# Patient Record
Sex: MALE | Race: WHITE | NOT HISPANIC OR LATINO | Employment: OTHER | URBAN - METROPOLITAN AREA
[De-identification: names, ages, dates, MRNs, and addresses within clinical notes are randomized per-mention and may not be internally consistent; named-entity substitution may affect disease eponyms.]

---

## 2023-01-01 ENCOUNTER — ANESTHESIA (OUTPATIENT)
Dept: OPERATING ROOM | Facility: HOSPITAL | Age: 83
End: 2023-01-01
Payer: MEDICARE

## 2023-01-01 ENCOUNTER — OFFICE VISIT (OUTPATIENT)
Dept: CARDIOLOGY | Facility: HOSPITAL | Age: 83
DRG: 377 | End: 2023-01-01
Payer: MEDICARE

## 2023-01-01 ENCOUNTER — OFFICE VISIT (OUTPATIENT)
Dept: CARDIOLOGY | Facility: HOSPITAL | Age: 83
End: 2023-01-01
Payer: MEDICARE

## 2023-01-01 ENCOUNTER — ANESTHESIA (OUTPATIENT)
Dept: OPERATING ROOM | Facility: HOSPITAL | Age: 83
DRG: 377 | End: 2023-01-01
Payer: MEDICARE

## 2023-01-01 ENCOUNTER — APPOINTMENT (OUTPATIENT)
Dept: CARDIOLOGY | Facility: HOSPITAL | Age: 83
End: 2023-01-01
Payer: MEDICARE

## 2023-01-01 ENCOUNTER — TELEPHONE (OUTPATIENT)
Dept: INFECTIOUS DISEASES | Facility: HOSPITAL | Age: 83
End: 2023-01-01

## 2023-01-01 ENCOUNTER — INFUSION (OUTPATIENT)
Dept: INFUSION THERAPY | Facility: HOSPITAL | Age: 83
End: 2023-01-01
Payer: MEDICARE

## 2023-01-01 ENCOUNTER — TELEPHONE (OUTPATIENT)
Dept: GASTROENTEROLOGY | Facility: CLINIC | Age: 83
End: 2023-01-01
Payer: MEDICARE

## 2023-01-01 ENCOUNTER — APPOINTMENT (OUTPATIENT)
Dept: GASTROENTEROLOGY | Facility: HOSPITAL | Age: 83
DRG: 377 | End: 2023-01-01
Payer: MEDICARE

## 2023-01-01 ENCOUNTER — INFUSION (OUTPATIENT)
Dept: INFUSION THERAPY | Facility: HOSPITAL | Age: 83
DRG: 377 | End: 2023-01-01
Payer: MEDICARE

## 2023-01-01 ENCOUNTER — APPOINTMENT (OUTPATIENT)
Dept: CARDIOLOGY | Facility: HOSPITAL | Age: 83
DRG: 283 | End: 2023-01-01
Payer: MEDICARE

## 2023-01-01 ENCOUNTER — ANESTHESIA EVENT (OUTPATIENT)
Dept: OPERATING ROOM | Facility: HOSPITAL | Age: 83
DRG: 377 | End: 2023-01-01
Payer: MEDICARE

## 2023-01-01 ENCOUNTER — LAB (OUTPATIENT)
Dept: LAB | Facility: LAB | Age: 83
End: 2023-01-01
Payer: MEDICARE

## 2023-01-01 ENCOUNTER — OFFICE VISIT (OUTPATIENT)
Dept: HEMATOLOGY/ONCOLOGY | Facility: CLINIC | Age: 83
End: 2023-01-01
Payer: MEDICARE

## 2023-01-01 ENCOUNTER — HOSPITAL ENCOUNTER (INPATIENT)
Facility: HOSPITAL | Age: 83
LOS: 4 days | Discharge: HOME | DRG: 377 | End: 2023-10-11
Attending: EMERGENCY MEDICINE | Admitting: INTERNAL MEDICINE
Payer: MEDICARE

## 2023-01-01 ENCOUNTER — HOSPITAL ENCOUNTER (INPATIENT)
Facility: HOSPITAL | Age: 83
LOS: 2 days | Discharge: HOME | DRG: 377 | End: 2023-10-28
Attending: STUDENT IN AN ORGANIZED HEALTH CARE EDUCATION/TRAINING PROGRAM | Admitting: INTERNAL MEDICINE
Payer: MEDICARE

## 2023-01-01 ENCOUNTER — APPOINTMENT (OUTPATIENT)
Dept: RADIOLOGY | Facility: HOSPITAL | Age: 83
End: 2023-01-01
Payer: MEDICARE

## 2023-01-01 ENCOUNTER — HOSPITAL ENCOUNTER (OUTPATIENT)
Dept: OPERATING ROOM | Facility: HOSPITAL | Age: 83
Discharge: HOME | End: 2023-12-07
Payer: MEDICARE

## 2023-01-01 ENCOUNTER — TELEPHONE (OUTPATIENT)
Dept: CARDIOLOGY | Facility: CLINIC | Age: 83
End: 2023-01-01

## 2023-01-01 ENCOUNTER — HOSPITAL ENCOUNTER (EMERGENCY)
Facility: HOSPITAL | Age: 83
Discharge: LEFT BEFORE DISCHARGE | End: 2023-10-06
Attending: STUDENT IN AN ORGANIZED HEALTH CARE EDUCATION/TRAINING PROGRAM | Admitting: EMERGENCY MEDICINE
Payer: MEDICARE

## 2023-01-01 ENCOUNTER — SPECIALTY PHARMACY (OUTPATIENT)
Dept: PHARMACY | Facility: CLINIC | Age: 83
End: 2023-01-01

## 2023-01-01 ENCOUNTER — ANESTHESIA EVENT (OUTPATIENT)
Dept: OPERATING ROOM | Facility: HOSPITAL | Age: 83
End: 2023-01-01
Payer: MEDICARE

## 2023-01-01 ENCOUNTER — APPOINTMENT (OUTPATIENT)
Dept: CARDIOLOGY | Facility: CLINIC | Age: 83
End: 2023-01-01
Payer: MEDICARE

## 2023-01-01 ENCOUNTER — APPOINTMENT (OUTPATIENT)
Dept: GASTROENTEROLOGY | Facility: CLINIC | Age: 83
End: 2023-01-01
Payer: MEDICARE

## 2023-01-01 ENCOUNTER — HOSPITAL ENCOUNTER (INPATIENT)
Facility: HOSPITAL | Age: 83
LOS: 4 days | DRG: 283 | End: 2023-12-23
Attending: EMERGENCY MEDICINE | Admitting: FAMILY MEDICINE
Payer: MEDICARE

## 2023-01-01 ENCOUNTER — APPOINTMENT (OUTPATIENT)
Dept: OPERATING ROOM | Facility: HOSPITAL | Age: 83
End: 2023-01-01
Payer: MEDICARE

## 2023-01-01 ENCOUNTER — APPOINTMENT (OUTPATIENT)
Dept: LAB | Facility: HOSPITAL | Age: 83
End: 2023-01-01
Payer: MEDICARE

## 2023-01-01 ENCOUNTER — APPOINTMENT (OUTPATIENT)
Dept: RADIOLOGY | Facility: HOSPITAL | Age: 83
DRG: 283 | End: 2023-01-01
Payer: MEDICARE

## 2023-01-01 ENCOUNTER — OFFICE VISIT (OUTPATIENT)
Dept: CARDIOLOGY | Facility: HOSPITAL | Age: 83
DRG: 283 | End: 2023-01-01
Payer: MEDICARE

## 2023-01-01 ENCOUNTER — APPOINTMENT (OUTPATIENT)
Dept: OPERATING ROOM | Facility: HOSPITAL | Age: 83
DRG: 377 | End: 2023-01-01
Payer: MEDICARE

## 2023-01-01 ENCOUNTER — TELEPHONE (OUTPATIENT)
Dept: CARDIOLOGY | Facility: CLINIC | Age: 83
End: 2023-01-01
Payer: MEDICARE

## 2023-01-01 ENCOUNTER — HOSPITAL ENCOUNTER (OUTPATIENT)
Dept: RADIOLOGY | Facility: HOSPITAL | Age: 83
Discharge: HOME | End: 2023-11-07
Payer: MEDICARE

## 2023-01-01 ENCOUNTER — TELEPHONE (OUTPATIENT)
Dept: GASTROENTEROLOGY | Facility: CLINIC | Age: 83
End: 2023-01-01

## 2023-01-01 ENCOUNTER — HOSPITAL ENCOUNTER (OUTPATIENT)
Dept: CARDIOLOGY | Facility: HOSPITAL | Age: 83
Discharge: HOME | End: 2023-11-08
Payer: MEDICARE

## 2023-01-01 ENCOUNTER — OFFICE VISIT (OUTPATIENT)
Dept: CARDIOLOGY | Facility: CLINIC | Age: 83
End: 2023-01-01
Payer: MEDICARE

## 2023-01-01 VITALS
RESPIRATION RATE: 20 BRPM | HEART RATE: 72 BPM | SYSTOLIC BLOOD PRESSURE: 124 MMHG | TEMPERATURE: 97.2 F | DIASTOLIC BLOOD PRESSURE: 64 MMHG | OXYGEN SATURATION: 96 %

## 2023-01-01 VITALS
DIASTOLIC BLOOD PRESSURE: 73 MMHG | HEART RATE: 71 BPM | RESPIRATION RATE: 20 BRPM | SYSTOLIC BLOOD PRESSURE: 116 MMHG | OXYGEN SATURATION: 96 % | TEMPERATURE: 98.6 F

## 2023-01-01 VITALS
DIASTOLIC BLOOD PRESSURE: 68 MMHG | HEART RATE: 72 BPM | WEIGHT: 191.4 LBS | OXYGEN SATURATION: 100 % | RESPIRATION RATE: 22 BRPM | SYSTOLIC BLOOD PRESSURE: 144 MMHG | BODY MASS INDEX: 29.1 KG/M2

## 2023-01-01 VITALS
RESPIRATION RATE: 20 BRPM | DIASTOLIC BLOOD PRESSURE: 61 MMHG | BODY MASS INDEX: 29.88 KG/M2 | OXYGEN SATURATION: 96 % | SYSTOLIC BLOOD PRESSURE: 109 MMHG | WEIGHT: 190.8 LBS | HEART RATE: 74 BPM

## 2023-01-01 VITALS
SYSTOLIC BLOOD PRESSURE: 109 MMHG | WEIGHT: 193.7 LBS | HEART RATE: 84 BPM | DIASTOLIC BLOOD PRESSURE: 55 MMHG | RESPIRATION RATE: 20 BRPM | OXYGEN SATURATION: 98 % | BODY MASS INDEX: 29.45 KG/M2

## 2023-01-01 VITALS
DIASTOLIC BLOOD PRESSURE: 62 MMHG | WEIGHT: 193.4 LBS | RESPIRATION RATE: 20 BRPM | SYSTOLIC BLOOD PRESSURE: 122 MMHG | OXYGEN SATURATION: 98 % | BODY MASS INDEX: 29.41 KG/M2 | HEART RATE: 77 BPM

## 2023-01-01 VITALS
HEART RATE: 71 BPM | WEIGHT: 186 LBS | HEIGHT: 68 IN | BODY MASS INDEX: 28.19 KG/M2 | SYSTOLIC BLOOD PRESSURE: 122 MMHG | DIASTOLIC BLOOD PRESSURE: 78 MMHG

## 2023-01-01 VITALS
WEIGHT: 192.2 LBS | SYSTOLIC BLOOD PRESSURE: 117 MMHG | OXYGEN SATURATION: 93 % | BODY MASS INDEX: 29.22 KG/M2 | RESPIRATION RATE: 20 BRPM | HEART RATE: 71 BPM | DIASTOLIC BLOOD PRESSURE: 72 MMHG

## 2023-01-01 VITALS
RESPIRATION RATE: 18 BRPM | DIASTOLIC BLOOD PRESSURE: 58 MMHG | OXYGEN SATURATION: 97 % | TEMPERATURE: 97.4 F | SYSTOLIC BLOOD PRESSURE: 108 MMHG | HEART RATE: 70 BPM

## 2023-01-01 VITALS
HEIGHT: 67 IN | TEMPERATURE: 101.4 F | DIASTOLIC BLOOD PRESSURE: 41 MMHG | WEIGHT: 180.78 LBS | RESPIRATION RATE: 20 BRPM | HEART RATE: 73 BPM | BODY MASS INDEX: 28.37 KG/M2 | SYSTOLIC BLOOD PRESSURE: 88 MMHG | OXYGEN SATURATION: 94 %

## 2023-01-01 VITALS
WEIGHT: 189.3 LBS | BODY MASS INDEX: 29.65 KG/M2 | HEART RATE: 74 BPM | DIASTOLIC BLOOD PRESSURE: 72 MMHG | SYSTOLIC BLOOD PRESSURE: 126 MMHG | OXYGEN SATURATION: 96 % | RESPIRATION RATE: 20 BRPM

## 2023-01-01 VITALS
RESPIRATION RATE: 18 BRPM | OXYGEN SATURATION: 97 % | DIASTOLIC BLOOD PRESSURE: 69 MMHG | HEART RATE: 70 BPM | WEIGHT: 191.7 LBS | SYSTOLIC BLOOD PRESSURE: 115 MMHG | HEIGHT: 68 IN | BODY MASS INDEX: 29.05 KG/M2

## 2023-01-01 VITALS
RESPIRATION RATE: 16 BRPM | DIASTOLIC BLOOD PRESSURE: 66 MMHG | OXYGEN SATURATION: 97 % | SYSTOLIC BLOOD PRESSURE: 119 MMHG | BODY MASS INDEX: 29.46 KG/M2 | WEIGHT: 193.78 LBS | TEMPERATURE: 97.1 F | HEART RATE: 73 BPM

## 2023-01-01 VITALS
OXYGEN SATURATION: 98 % | SYSTOLIC BLOOD PRESSURE: 112 MMHG | DIASTOLIC BLOOD PRESSURE: 63 MMHG | WEIGHT: 193.4 LBS | HEART RATE: 72 BPM | RESPIRATION RATE: 18 BRPM | BODY MASS INDEX: 29.41 KG/M2

## 2023-01-01 VITALS
HEART RATE: 83 BPM | RESPIRATION RATE: 16 BRPM | WEIGHT: 190.7 LBS | BODY MASS INDEX: 28.9 KG/M2 | DIASTOLIC BLOOD PRESSURE: 64 MMHG | TEMPERATURE: 97.3 F | HEIGHT: 68 IN | OXYGEN SATURATION: 100 % | SYSTOLIC BLOOD PRESSURE: 110 MMHG

## 2023-01-01 VITALS
OXYGEN SATURATION: 98 % | WEIGHT: 180.78 LBS | TEMPERATURE: 98.8 F | BODY MASS INDEX: 28.37 KG/M2 | DIASTOLIC BLOOD PRESSURE: 57 MMHG | RESPIRATION RATE: 18 BRPM | HEART RATE: 71 BPM | SYSTOLIC BLOOD PRESSURE: 104 MMHG | HEIGHT: 67 IN

## 2023-01-01 VITALS
OXYGEN SATURATION: 97 % | WEIGHT: 180 LBS | RESPIRATION RATE: 10 BRPM | HEIGHT: 67 IN | BODY MASS INDEX: 28.25 KG/M2 | SYSTOLIC BLOOD PRESSURE: 104 MMHG | HEART RATE: 70 BPM | DIASTOLIC BLOOD PRESSURE: 69 MMHG | TEMPERATURE: 97.4 F

## 2023-01-01 VITALS
TEMPERATURE: 96.8 F | OXYGEN SATURATION: 92 % | HEART RATE: 71 BPM | DIASTOLIC BLOOD PRESSURE: 74 MMHG | RESPIRATION RATE: 17 BRPM | SYSTOLIC BLOOD PRESSURE: 99 MMHG | BODY MASS INDEX: 29.19 KG/M2 | HEIGHT: 67 IN | WEIGHT: 186 LBS

## 2023-01-01 VITALS
SYSTOLIC BLOOD PRESSURE: 112 MMHG | RESPIRATION RATE: 20 BRPM | HEART RATE: 70 BPM | OXYGEN SATURATION: 98 % | DIASTOLIC BLOOD PRESSURE: 66 MMHG

## 2023-01-01 VITALS
TEMPERATURE: 97 F | SYSTOLIC BLOOD PRESSURE: 117 MMHG | DIASTOLIC BLOOD PRESSURE: 65 MMHG | WEIGHT: 191 LBS | BODY MASS INDEX: 28.95 KG/M2 | RESPIRATION RATE: 18 BRPM | HEART RATE: 72 BPM | HEIGHT: 68 IN | OXYGEN SATURATION: 98 %

## 2023-01-01 VITALS
RESPIRATION RATE: 18 BRPM | SYSTOLIC BLOOD PRESSURE: 120 MMHG | DIASTOLIC BLOOD PRESSURE: 64 MMHG | OXYGEN SATURATION: 95 % | HEART RATE: 72 BPM

## 2023-01-01 VITALS
OXYGEN SATURATION: 94 % | DIASTOLIC BLOOD PRESSURE: 69 MMHG | HEART RATE: 74 BPM | BODY MASS INDEX: 29.23 KG/M2 | SYSTOLIC BLOOD PRESSURE: 121 MMHG | RESPIRATION RATE: 20 BRPM | WEIGHT: 186.6 LBS

## 2023-01-01 DIAGNOSIS — I50.42 CHRONIC COMBINED SYSTOLIC AND DIASTOLIC HEART FAILURE (MULTI): ICD-10-CM

## 2023-01-01 DIAGNOSIS — I50.32 CHRONIC DIASTOLIC CONGESTIVE HEART FAILURE (MULTI): ICD-10-CM

## 2023-01-01 DIAGNOSIS — I50.42 CHRONIC COMBINED SYSTOLIC AND DIASTOLIC HEART FAILURE (MULTI): Primary | ICD-10-CM

## 2023-01-01 DIAGNOSIS — I50.43 ACUTE ON CHRONIC COMBINED SYSTOLIC AND DIASTOLIC CHF (CONGESTIVE HEART FAILURE) (MULTI): Primary | ICD-10-CM

## 2023-01-01 DIAGNOSIS — N18.32 STAGE 3B CHRONIC KIDNEY DISEASE (MULTI): ICD-10-CM

## 2023-01-01 DIAGNOSIS — I50.43 ACUTE ON CHRONIC COMBINED SYSTOLIC AND DIASTOLIC CHF (CONGESTIVE HEART FAILURE) (MULTI): ICD-10-CM

## 2023-01-01 DIAGNOSIS — I43 CARDIAC AMYLOIDOSIS (MULTI): ICD-10-CM

## 2023-01-01 DIAGNOSIS — I48.0 PAROXYSMAL ATRIAL FIBRILLATION (MULTI): ICD-10-CM

## 2023-01-01 DIAGNOSIS — N18.32 CHRONIC KIDNEY DISEASE, STAGE 3B (MULTI): ICD-10-CM

## 2023-01-01 DIAGNOSIS — Z95.0 PRESENCE OF PERMANENT CARDIAC PACEMAKER: ICD-10-CM

## 2023-01-01 DIAGNOSIS — I25.10 ATHEROSCLEROSIS OF NATIVE CORONARY ARTERY OF NATIVE HEART WITHOUT ANGINA PECTORIS: ICD-10-CM

## 2023-01-01 DIAGNOSIS — I50.22 CHRONIC SYSTOLIC HEART FAILURE (MULTI): ICD-10-CM

## 2023-01-01 DIAGNOSIS — I50.22 CHRONIC SYSTOLIC HEART FAILURE (MULTI): Chronic | ICD-10-CM

## 2023-01-01 DIAGNOSIS — Z98.61 CORONARY ANGIOPLASTY STATUS: Primary | ICD-10-CM

## 2023-01-01 DIAGNOSIS — R53.1 GENERALIZED WEAKNESS: ICD-10-CM

## 2023-01-01 DIAGNOSIS — R06.09 DYSPNEA ON EXERTION: ICD-10-CM

## 2023-01-01 DIAGNOSIS — I50.43 HEART FAILURE, ACUTE ON CHRONIC, SYSTOLIC AND DIASTOLIC (MULTI): Primary | ICD-10-CM

## 2023-01-01 DIAGNOSIS — I25.9 CHRONIC ISCHEMIC HEART DISEASE: ICD-10-CM

## 2023-01-01 DIAGNOSIS — C18.9 MALIGNANT NEOPLASM OF COLON, UNSPECIFIED (MULTI): ICD-10-CM

## 2023-01-01 DIAGNOSIS — E85.9 AMYLOIDOSIS, UNSPECIFIED TYPE (MULTI): Primary | ICD-10-CM

## 2023-01-01 DIAGNOSIS — D62 ACUTE ON CHRONIC BLOOD LOSS ANEMIA: Primary | ICD-10-CM

## 2023-01-01 DIAGNOSIS — I48.92 ATRIAL FLUTTER, UNSPECIFIED TYPE (MULTI): ICD-10-CM

## 2023-01-01 DIAGNOSIS — R60.0 LOCALIZED EDEMA: ICD-10-CM

## 2023-01-01 DIAGNOSIS — N50.89 SCROTAL EDEMA: ICD-10-CM

## 2023-01-01 DIAGNOSIS — E85.4 CARDIAC AMYLOIDOSIS (MULTI): ICD-10-CM

## 2023-01-01 DIAGNOSIS — I50.22 CHRONIC SYSTOLIC HEART FAILURE (MULTI): Primary | Chronic | ICD-10-CM

## 2023-01-01 DIAGNOSIS — E61.1 IRON DEFICIENCY: ICD-10-CM

## 2023-01-01 DIAGNOSIS — I50.22 CHRONIC SYSTOLIC HEART FAILURE (MULTI): Primary | ICD-10-CM

## 2023-01-01 DIAGNOSIS — R06.00 DYSPNEA, UNSPECIFIED TYPE: ICD-10-CM

## 2023-01-01 DIAGNOSIS — I21.4 NSTEMI (NON-ST ELEVATED MYOCARDIAL INFARCTION) (MULTI): ICD-10-CM

## 2023-01-01 DIAGNOSIS — K92.2 GI BLEEDING: Primary | ICD-10-CM

## 2023-01-01 DIAGNOSIS — K31.819 GAVE (GASTRIC ANTRAL VASCULAR ECTASIA): Primary | ICD-10-CM

## 2023-01-01 DIAGNOSIS — Z79.899 HIGH RISK MEDICATIONS (NOT ANTICOAGULANTS) LONG-TERM USE: ICD-10-CM

## 2023-01-01 DIAGNOSIS — I50.9 ACUTE ON CHRONIC CONGESTIVE HEART FAILURE, UNSPECIFIED HEART FAILURE TYPE (MULTI): ICD-10-CM

## 2023-01-01 DIAGNOSIS — K92.2 GASTROINTESTINAL HEMORRHAGE, UNSPECIFIED: ICD-10-CM

## 2023-01-01 DIAGNOSIS — I50.32 CHRONIC DIASTOLIC CONGESTIVE HEART FAILURE (MULTI): Primary | ICD-10-CM

## 2023-01-01 DIAGNOSIS — I10 ESSENTIAL HYPERTENSION: ICD-10-CM

## 2023-01-01 DIAGNOSIS — K31.811 GASTRIC HEMORRHAGE DUE TO GASTRIC ANTRAL VASCULAR ECTASIA (GAVE): ICD-10-CM

## 2023-01-01 DIAGNOSIS — D61.818 PANCYTOPENIA (MULTI): ICD-10-CM

## 2023-01-01 DIAGNOSIS — Z95.1 HX OF CABG: ICD-10-CM

## 2023-01-01 DIAGNOSIS — D64.9 ANEMIA, UNSPECIFIED TYPE: ICD-10-CM

## 2023-01-01 DIAGNOSIS — D50.0 IRON DEFICIENCY ANEMIA SECONDARY TO BLOOD LOSS (CHRONIC): Primary | ICD-10-CM

## 2023-01-01 DIAGNOSIS — C20 RECTAL CANCER (MULTI): ICD-10-CM

## 2023-01-01 DIAGNOSIS — I50.32 CHRONIC DIASTOLIC (CONGESTIVE) HEART FAILURE (MULTI): ICD-10-CM

## 2023-01-01 DIAGNOSIS — S09.90XA HEAD INJURY, INITIAL ENCOUNTER: ICD-10-CM

## 2023-01-01 DIAGNOSIS — D50.0 IRON DEFICIENCY ANEMIA SECONDARY TO BLOOD LOSS (CHRONIC): ICD-10-CM

## 2023-01-01 DIAGNOSIS — E61.1 IRON DEFICIENCY: Primary | ICD-10-CM

## 2023-01-01 DIAGNOSIS — K31.811 GASTRIC HEMORRHAGE DUE TO GASTRIC ANTRAL VASCULAR ECTASIA (GAVE): Primary | ICD-10-CM

## 2023-01-01 DIAGNOSIS — K92.2 GASTROINTESTINAL HEMORRHAGE, UNSPECIFIED GASTROINTESTINAL HEMORRHAGE TYPE: Primary | ICD-10-CM

## 2023-01-01 DIAGNOSIS — Z98.61 POST PTCA: ICD-10-CM

## 2023-01-01 DIAGNOSIS — I48.0 PAROXYSMAL ATRIAL FIBRILLATION (MULTI): Primary | ICD-10-CM

## 2023-01-01 DIAGNOSIS — D68.9 COAGULATION DEFECT, UNSPECIFIED (MULTI): ICD-10-CM

## 2023-01-01 DIAGNOSIS — E78.5 DYSLIPIDEMIA: ICD-10-CM

## 2023-01-01 DIAGNOSIS — K92.2 GASTROINTESTINAL HEMORRHAGE, UNSPECIFIED GASTROINTESTINAL HEMORRHAGE TYPE: ICD-10-CM

## 2023-01-01 DIAGNOSIS — K31.819 GAVE (GASTRIC ANTRAL VASCULAR ECTASIA): ICD-10-CM

## 2023-01-01 DIAGNOSIS — I25.118 CORONARY ARTERY DISEASE OF NATIVE ARTERY OF NATIVE HEART WITH STABLE ANGINA PECTORIS (CMS-HCC): ICD-10-CM

## 2023-01-01 LAB
ABO GROUP (TYPE) IN BLOOD: NORMAL
ALBUMIN (G/DL) IN SER/PLAS: 3.4 G/DL (ref 3.4–5)
ALBUMIN SERPL BCP-MCNC: 2.8 G/DL (ref 3.4–5)
ALBUMIN SERPL BCP-MCNC: 3 G/DL (ref 3.4–5)
ALBUMIN SERPL BCP-MCNC: 3.1 G/DL (ref 3.4–5)
ALBUMIN SERPL BCP-MCNC: 3.1 G/DL (ref 3.4–5)
ALBUMIN SERPL BCP-MCNC: 3.2 G/DL (ref 3.4–5)
ALBUMIN SERPL BCP-MCNC: 3.3 G/DL (ref 3.4–5)
ALBUMIN SERPL BCP-MCNC: 3.3 G/DL (ref 3.4–5)
ALP SERPL-CCNC: 122 U/L (ref 33–136)
ALP SERPL-CCNC: 138 U/L (ref 33–136)
ALP SERPL-CCNC: 88 U/L (ref 33–136)
ALP SERPL-CCNC: 90 U/L (ref 33–136)
ALP SERPL-CCNC: 93 U/L (ref 33–136)
ALT SERPL W P-5'-P-CCNC: 24 U/L (ref 10–52)
ALT SERPL W P-5'-P-CCNC: 26 U/L (ref 10–52)
ALT SERPL W P-5'-P-CCNC: 26 U/L (ref 10–52)
ALT SERPL W P-5'-P-CCNC: 28 U/L (ref 10–52)
ALT SERPL W P-5'-P-CCNC: 30 U/L (ref 10–52)
ANION GAP IN SER/PLAS: 11 MMOL/L (ref 10–20)
ANION GAP IN SER/PLAS: 12 MMOL/L (ref 10–20)
ANION GAP IN SER/PLAS: 13 MMOL/L (ref 10–20)
ANION GAP IN SER/PLAS: 13 MMOL/L (ref 10–20)
ANION GAP IN SER/PLAS: 14 MMOL/L (ref 10–20)
ANION GAP IN SER/PLAS: 15 MMOL/L (ref 10–20)
ANION GAP IN SER/PLAS: 9 MMOL/L (ref 10–20)
ANION GAP SERPL CALC-SCNC: 10 MMOL/L (ref 10–20)
ANION GAP SERPL CALC-SCNC: 11 MMOL/L (ref 10–20)
ANION GAP SERPL CALC-SCNC: 11 MMOL/L (ref 10–20)
ANION GAP SERPL CALC-SCNC: 12 MMOL/L (ref 10–20)
ANION GAP SERPL CALC-SCNC: 13 MMOL/L (ref 10–20)
ANION GAP SERPL CALC-SCNC: 14 MMOL/L (ref 10–20)
ANION GAP SERPL CALC-SCNC: 15 MMOL/L (ref 10–20)
ANION GAP SERPL CALC-SCNC: 16 MMOL/L (ref 10–20)
ANION GAP SERPL CALC-SCNC: 16 MMOL/L (ref 10–20)
ANION GAP SERPL CALC-SCNC: 17 MMOL/L (ref 10–20)
ANION GAP SERPL CALC-SCNC: 18 MMOL/L (ref 10–20)
ANTIBODY SCREEN: NORMAL
APPEARANCE UR: CLEAR
APTT PPP: 32 SECONDS (ref 27–38)
AST SERPL W P-5'-P-CCNC: 50 U/L (ref 9–39)
AST SERPL W P-5'-P-CCNC: 53 U/L (ref 9–39)
AST SERPL W P-5'-P-CCNC: 55 U/L (ref 9–39)
AST SERPL W P-5'-P-CCNC: 69 U/L (ref 9–39)
AST SERPL W P-5'-P-CCNC: 89 U/L (ref 9–39)
ATRIAL RATE: 69 BPM
BASOPHILIC STIPPLING PRESENCE IN BLOOD BY LIGHT MICROSCOPY: PRESENT
BASOPHILS # BLD AUTO: 0.01 X10*3/UL (ref 0–0.1)
BASOPHILS # BLD AUTO: 0.01 X10*3/UL (ref 0–0.1)
BASOPHILS # BLD AUTO: 0.02 X10*3/UL (ref 0–0.1)
BASOPHILS # BLD MANUAL: 0 X10*3/UL (ref 0–0.1)
BASOPHILS NFR BLD AUTO: 0.1 %
BASOPHILS NFR BLD AUTO: 0.3 %
BASOPHILS NFR BLD AUTO: 0.5 %
BASOPHILS NFR BLD MANUAL: 0 %
BILIRUB SERPL-MCNC: 0.7 MG/DL (ref 0–1.2)
BILIRUB SERPL-MCNC: 0.7 MG/DL (ref 0–1.2)
BILIRUB SERPL-MCNC: 0.8 MG/DL (ref 0–1.2)
BILIRUB SERPL-MCNC: 1 MG/DL (ref 0–1.2)
BILIRUB SERPL-MCNC: 1 MG/DL (ref 0–1.2)
BILIRUB UR STRIP.AUTO-MCNC: NEGATIVE MG/DL
BLOOD EXPIRATION DATE: NORMAL
BNP SERPL-MCNC: 1311 PG/ML (ref 0–99)
BNP SERPL-MCNC: 674 PG/ML (ref 0–99)
BNP SERPL-MCNC: 689 PG/ML (ref 0–99)
BNP SERPL-MCNC: 761 PG/ML (ref 0–99)
BNP SERPL-MCNC: 907 PG/ML (ref 0–99)
BNP SERPL-MCNC: 942 PG/ML (ref 0–99)
BUN SERPL-MCNC: 43 MG/DL (ref 6–23)
BUN SERPL-MCNC: 43 MG/DL (ref 6–23)
BUN SERPL-MCNC: 45 MG/DL (ref 6–23)
BUN SERPL-MCNC: 47 MG/DL (ref 6–23)
BUN SERPL-MCNC: 49 MG/DL (ref 6–23)
BUN SERPL-MCNC: 51 MG/DL (ref 6–23)
BUN SERPL-MCNC: 53 MG/DL (ref 6–23)
BUN SERPL-MCNC: 56 MG/DL (ref 6–23)
BUN SERPL-MCNC: 60 MG/DL (ref 6–23)
BUN SERPL-MCNC: 63 MG/DL (ref 6–23)
BUN SERPL-MCNC: 65 MG/DL (ref 6–23)
BUN SERPL-MCNC: 68 MG/DL (ref 6–23)
BUN SERPL-MCNC: 70 MG/DL (ref 6–23)
BUN SERPL-MCNC: 72 MG/DL (ref 6–23)
BUN SERPL-MCNC: 73 MG/DL (ref 6–23)
BUN SERPL-MCNC: 73 MG/DL (ref 6–23)
BUN SERPL-MCNC: 74 MG/DL (ref 6–23)
BUN SERPL-MCNC: 78 MG/DL (ref 6–23)
BUN SERPL-MCNC: 79 MG/DL (ref 6–23)
BUN SERPL-MCNC: 81 MG/DL (ref 6–23)
BUN SERPL-MCNC: 82 MG/DL (ref 6–23)
BUN SERPL-MCNC: 84 MG/DL (ref 6–23)
BUN SERPL-MCNC: 87 MG/DL (ref 6–23)
BUN SERPL-MCNC: 90 MG/DL (ref 6–23)
C REACTIVE PROTEIN (MG/L) IN SER/PLAS: 1.87 MG/DL
CALCIUM (MG/DL) IN SER/PLAS: 8.1 MG/DL (ref 8.6–10.3)
CALCIUM (MG/DL) IN SER/PLAS: 8.4 MG/DL (ref 8.6–10.3)
CALCIUM (MG/DL) IN SER/PLAS: 8.5 MG/DL (ref 8.6–10.3)
CALCIUM (MG/DL) IN SER/PLAS: 8.8 MG/DL (ref 8.6–10.3)
CALCIUM (MG/DL) IN SER/PLAS: 9 MG/DL (ref 8.6–10.3)
CALCIUM (MG/DL) IN SER/PLAS: 9.1 MG/DL (ref 8.6–10.3)
CALCIUM SERPL-MCNC: 10.1 MG/DL (ref 8.6–10.3)
CALCIUM SERPL-MCNC: 7.5 MG/DL (ref 8.6–10.3)
CALCIUM SERPL-MCNC: 7.8 MG/DL (ref 8.6–10.3)
CALCIUM SERPL-MCNC: 7.9 MG/DL (ref 8.6–10.3)
CALCIUM SERPL-MCNC: 8 MG/DL (ref 8.6–10.3)
CALCIUM SERPL-MCNC: 8.1 MG/DL (ref 8.6–10.3)
CALCIUM SERPL-MCNC: 8.1 MG/DL (ref 8.6–10.3)
CALCIUM SERPL-MCNC: 8.2 MG/DL (ref 8.6–10.3)
CALCIUM SERPL-MCNC: 8.4 MG/DL (ref 8.6–10.3)
CALCIUM SERPL-MCNC: 8.4 MG/DL (ref 8.6–10.3)
CALCIUM SERPL-MCNC: 8.5 MG/DL (ref 8.6–10.3)
CALCIUM SERPL-MCNC: 8.5 MG/DL (ref 8.6–10.3)
CALCIUM SERPL-MCNC: 8.6 MG/DL (ref 8.6–10.3)
CALCIUM SERPL-MCNC: 8.7 MG/DL (ref 8.6–10.3)
CALCIUM SERPL-MCNC: 8.7 MG/DL (ref 8.6–10.3)
CALCIUM SERPL-MCNC: 8.9 MG/DL (ref 8.6–10.3)
CARBON DIOXIDE, TOTAL (MMOL/L) IN SER/PLAS: 25 MMOL/L (ref 21–32)
CARBON DIOXIDE, TOTAL (MMOL/L) IN SER/PLAS: 26 MMOL/L (ref 21–32)
CARBON DIOXIDE, TOTAL (MMOL/L) IN SER/PLAS: 26 MMOL/L (ref 21–32)
CARBON DIOXIDE, TOTAL (MMOL/L) IN SER/PLAS: 27 MMOL/L (ref 21–32)
CARBON DIOXIDE, TOTAL (MMOL/L) IN SER/PLAS: 27 MMOL/L (ref 21–32)
CARBON DIOXIDE, TOTAL (MMOL/L) IN SER/PLAS: 29 MMOL/L (ref 21–32)
CARBON DIOXIDE, TOTAL (MMOL/L) IN SER/PLAS: 33 MMOL/L (ref 21–32)
CARDIAC TROPONIN I PNL SERPL HS: 133 NG/L (ref 0–20)
CARDIAC TROPONIN I PNL SERPL HS: 136 NG/L (ref 0–20)
CARDIAC TROPONIN I PNL SERPL HS: 143 NG/L (ref 0–20)
CARDIAC TROPONIN I PNL SERPL HS: 145 NG/L (ref 0–20)
CARDIAC TROPONIN I PNL SERPL HS: 156 NG/L (ref 0–20)
CARDIAC TROPONIN I PNL SERPL HS: 156 NG/L (ref 0–20)
CARDIAC TROPONIN I PNL SERPL HS: 166 NG/L (ref 0–20)
CARDIAC TROPONIN I PNL SERPL HS: 166 NG/L (ref 0–20)
CARDIAC TROPONIN I PNL SERPL HS: 170 NG/L (ref 0–20)
CELLS COUNTED TOTAL (#) IN BODY FLUID: 100
CHLORIDE (MMOL/L) IN SER/PLAS: 100 MMOL/L (ref 98–107)
CHLORIDE (MMOL/L) IN SER/PLAS: 101 MMOL/L (ref 98–107)
CHLORIDE (MMOL/L) IN SER/PLAS: 102 MMOL/L (ref 98–107)
CHLORIDE (MMOL/L) IN SER/PLAS: 102 MMOL/L (ref 98–107)
CHLORIDE (MMOL/L) IN SER/PLAS: 95 MMOL/L (ref 98–107)
CHLORIDE (MMOL/L) IN SER/PLAS: 95 MMOL/L (ref 98–107)
CHLORIDE (MMOL/L) IN SER/PLAS: 98 MMOL/L (ref 98–107)
CHLORIDE (MMOL/L) IN SER/PLAS: 98 MMOL/L (ref 98–107)
CHLORIDE (MMOL/L) IN SER/PLAS: 99 MMOL/L (ref 98–107)
CHLORIDE SERPL-SCNC: 100 MMOL/L (ref 98–107)
CHLORIDE SERPL-SCNC: 100 MMOL/L (ref 98–107)
CHLORIDE SERPL-SCNC: 101 MMOL/L (ref 98–107)
CHLORIDE SERPL-SCNC: 89 MMOL/L (ref 98–107)
CHLORIDE SERPL-SCNC: 90 MMOL/L (ref 98–107)
CHLORIDE SERPL-SCNC: 92 MMOL/L (ref 98–107)
CHLORIDE SERPL-SCNC: 93 MMOL/L (ref 98–107)
CHLORIDE SERPL-SCNC: 94 MMOL/L (ref 98–107)
CHLORIDE SERPL-SCNC: 95 MMOL/L (ref 98–107)
CHLORIDE SERPL-SCNC: 96 MMOL/L (ref 98–107)
CHLORIDE SERPL-SCNC: 97 MMOL/L (ref 98–107)
CHLORIDE SERPL-SCNC: 97 MMOL/L (ref 98–107)
CHLORIDE SERPL-SCNC: 98 MMOL/L (ref 98–107)
CHLORIDE SERPL-SCNC: 98 MMOL/L (ref 98–107)
CHLORIDE SERPL-SCNC: 99 MMOL/L (ref 98–107)
CLARITY FLUID: NORMAL
CO2 SERPL-SCNC: 22 MMOL/L (ref 21–32)
CO2 SERPL-SCNC: 23 MMOL/L (ref 21–32)
CO2 SERPL-SCNC: 24 MMOL/L (ref 21–32)
CO2 SERPL-SCNC: 25 MMOL/L (ref 21–32)
CO2 SERPL-SCNC: 26 MMOL/L (ref 21–32)
CO2 SERPL-SCNC: 26 MMOL/L (ref 21–32)
CO2 SERPL-SCNC: 27 MMOL/L (ref 21–32)
CO2 SERPL-SCNC: 28 MMOL/L (ref 21–32)
CO2 SERPL-SCNC: 31 MMOL/L (ref 21–32)
CO2 SERPL-SCNC: 34 MMOL/L (ref 21–32)
CO2 SERPL-SCNC: 35 MMOL/L (ref 21–32)
CO2 SERPL-SCNC: 35 MMOL/L (ref 21–32)
COLOR OF BODY FLUID: NORMAL
COLOR UR: YELLOW
CREAT SERPL-MCNC: 1.91 MG/DL (ref 0.5–1.3)
CREAT SERPL-MCNC: 1.95 MG/DL (ref 0.5–1.3)
CREAT SERPL-MCNC: 2.03 MG/DL (ref 0.5–1.3)
CREAT SERPL-MCNC: 2.04 MG/DL (ref 0.5–1.3)
CREAT SERPL-MCNC: 2.04 MG/DL (ref 0.5–1.3)
CREAT SERPL-MCNC: 2.06 MG/DL (ref 0.5–1.3)
CREAT SERPL-MCNC: 2.15 MG/DL (ref 0.5–1.3)
CREAT SERPL-MCNC: 2.21 MG/DL (ref 0.5–1.3)
CREAT SERPL-MCNC: 2.24 MG/DL (ref 0.5–1.3)
CREAT SERPL-MCNC: 2.32 MG/DL (ref 0.5–1.3)
CREAT SERPL-MCNC: 2.35 MG/DL (ref 0.5–1.3)
CREAT SERPL-MCNC: 2.39 MG/DL (ref 0.5–1.3)
CREAT SERPL-MCNC: 2.39 MG/DL (ref 0.5–1.3)
CREAT SERPL-MCNC: 2.4 MG/DL (ref 0.5–1.3)
CREAT SERPL-MCNC: 2.47 MG/DL (ref 0.5–1.3)
CREAT SERPL-MCNC: 2.58 MG/DL (ref 0.5–1.3)
CREAT SERPL-MCNC: 2.62 MG/DL (ref 0.5–1.3)
CREAT SERPL-MCNC: 2.62 MG/DL (ref 0.5–1.3)
CREAT SERPL-MCNC: 2.63 MG/DL (ref 0.5–1.3)
CREAT SERPL-MCNC: 2.67 MG/DL (ref 0.5–1.3)
CREAT SERPL-MCNC: 2.68 MG/DL (ref 0.5–1.3)
CREAT SERPL-MCNC: 2.8 MG/DL (ref 0.5–1.3)
CREAT SERPL-MCNC: 2.92 MG/DL (ref 0.5–1.3)
CREAT SERPL-MCNC: 3 MG/DL (ref 0.5–1.3)
CREATININE (MG/DL) IN SER/PLAS: 1.14 MG/DL (ref 0.5–1.3)
CREATININE (MG/DL) IN SER/PLAS: 1.23 MG/DL (ref 0.5–1.3)
CREATININE (MG/DL) IN SER/PLAS: 1.58 MG/DL (ref 0.5–1.3)
CREATININE (MG/DL) IN SER/PLAS: 1.67 MG/DL (ref 0.5–1.3)
CREATININE (MG/DL) IN SER/PLAS: 1.67 MG/DL (ref 0.5–1.3)
CREATININE (MG/DL) IN SER/PLAS: 1.72 MG/DL (ref 0.5–1.3)
CREATININE (MG/DL) IN SER/PLAS: 2.02 MG/DL (ref 0.5–1.3)
CREATININE (MG/DL) IN SER/PLAS: 2.04 MG/DL (ref 0.5–1.3)
CREATININE (MG/DL) IN SER/PLAS: 2.14 MG/DL (ref 0.5–1.3)
DACROCYTES PRESENCE IN BLOOD BY LIGHT MICROSCOPY: NORMAL
DACRYOCYTES BLD QL SMEAR: ABNORMAL
DISPENSE STATUS: NORMAL
EJECTION FRACTION APICAL 4 CHAMBER: 34.8
EJECTION FRACTION: 42
EOSINOPHIL # BLD AUTO: 0.01 X10*3/UL (ref 0–0.4)
EOSINOPHIL # BLD AUTO: 0.06 X10*3/UL (ref 0–0.4)
EOSINOPHIL # BLD AUTO: 0.12 X10*3/UL (ref 0–0.4)
EOSINOPHIL # BLD MANUAL: 0.23 X10*3/UL (ref 0–0.4)
EOSINOPHIL NFR BLD AUTO: 0.1 %
EOSINOPHIL NFR BLD AUTO: 1.4 %
EOSINOPHIL NFR BLD AUTO: 3.1 %
EOSINOPHIL NFR BLD MANUAL: 4 %
EOSINOPHILS/100 LEUKOCYTES IN BODY FLUID BY MANUAL COUNT: 1 %
ERYTHROCYTE DISTRIBUTION WIDTH (RATIO) BY AUTOMATED COUNT: 18.3 % (ref 11.5–14.5)
ERYTHROCYTE DISTRIBUTION WIDTH (RATIO) BY AUTOMATED COUNT: 18.4 % (ref 11.5–14.5)
ERYTHROCYTE DISTRIBUTION WIDTH (RATIO) BY AUTOMATED COUNT: 18.6 % (ref 11.5–14.5)
ERYTHROCYTE DISTRIBUTION WIDTH (RATIO) BY AUTOMATED COUNT: 20.1 % (ref 11.5–14.5)
ERYTHROCYTE MEAN CORPUSCULAR HEMOGLOBIN CONCENTRATION (G/DL) BY AUTOMATED: 29.7 G/DL (ref 32–36)
ERYTHROCYTE MEAN CORPUSCULAR HEMOGLOBIN CONCENTRATION (G/DL) BY AUTOMATED: 30.6 G/DL (ref 32–36)
ERYTHROCYTE MEAN CORPUSCULAR HEMOGLOBIN CONCENTRATION (G/DL) BY AUTOMATED: 30.7 G/DL (ref 32–36)
ERYTHROCYTE MEAN CORPUSCULAR HEMOGLOBIN CONCENTRATION (G/DL) BY AUTOMATED: 31.2 G/DL (ref 32–36)
ERYTHROCYTE MEAN CORPUSCULAR VOLUME (FL) BY AUTOMATED COUNT: 100 FL (ref 80–100)
ERYTHROCYTE MEAN CORPUSCULAR VOLUME (FL) BY AUTOMATED COUNT: 96 FL (ref 80–100)
ERYTHROCYTE MEAN CORPUSCULAR VOLUME (FL) BY AUTOMATED COUNT: 97 FL (ref 80–100)
ERYTHROCYTE MEAN CORPUSCULAR VOLUME (FL) BY AUTOMATED COUNT: 99 FL (ref 80–100)
ERYTHROCYTE [DISTWIDTH] IN BLOOD BY AUTOMATED COUNT: 17.9 % (ref 11.5–14.5)
ERYTHROCYTE [DISTWIDTH] IN BLOOD BY AUTOMATED COUNT: 18.2 % (ref 11.5–14.5)
ERYTHROCYTE [DISTWIDTH] IN BLOOD BY AUTOMATED COUNT: 18.4 % (ref 11.5–14.5)
ERYTHROCYTE [DISTWIDTH] IN BLOOD BY AUTOMATED COUNT: 18.7 % (ref 11.5–14.5)
ERYTHROCYTE [DISTWIDTH] IN BLOOD BY AUTOMATED COUNT: 18.7 % (ref 11.5–14.5)
ERYTHROCYTE [DISTWIDTH] IN BLOOD BY AUTOMATED COUNT: 19.2 % (ref 11.5–14.5)
ERYTHROCYTE [DISTWIDTH] IN BLOOD BY AUTOMATED COUNT: 19.4 % (ref 11.5–14.5)
ERYTHROCYTE [DISTWIDTH] IN BLOOD BY AUTOMATED COUNT: 19.6 % (ref 11.5–14.5)
ERYTHROCYTE [DISTWIDTH] IN BLOOD BY AUTOMATED COUNT: 19.9 % (ref 11.5–14.5)
ERYTHROCYTE [DISTWIDTH] IN BLOOD BY AUTOMATED COUNT: 20.3 % (ref 11.5–14.5)
ERYTHROCYTE [DISTWIDTH] IN BLOOD BY AUTOMATED COUNT: 20.3 % (ref 11.5–14.5)
ERYTHROCYTE [DISTWIDTH] IN BLOOD BY AUTOMATED COUNT: 20.8 % (ref 11.5–14.5)
ERYTHROCYTE [DISTWIDTH] IN BLOOD BY AUTOMATED COUNT: 21 % (ref 11.5–14.5)
ERYTHROCYTE [DISTWIDTH] IN BLOOD BY AUTOMATED COUNT: 21.3 % (ref 11.5–14.5)
ERYTHROCYTE [DISTWIDTH] IN BLOOD BY AUTOMATED COUNT: 21.4 % (ref 11.5–14.5)
ERYTHROCYTE [DISTWIDTH] IN BLOOD BY AUTOMATED COUNT: 21.5 % (ref 11.5–14.5)
ERYTHROCYTE [DISTWIDTH] IN BLOOD BY AUTOMATED COUNT: 21.9 % (ref 11.5–14.5)
ERYTHROCYTE [DISTWIDTH] IN BLOOD BY AUTOMATED COUNT: 22.3 % (ref 11.5–14.5)
ERYTHROCYTE [DISTWIDTH] IN BLOOD BY AUTOMATED COUNT: 22.4 % (ref 11.5–14.5)
ERYTHROCYTE [DISTWIDTH] IN BLOOD BY AUTOMATED COUNT: 22.5 % (ref 11.5–14.5)
ERYTHROCYTES (/UL) IN BODY FLUID: NORMAL /UL
ERYTHROCYTES (10*6/UL) IN BLOOD BY AUTOMATED COUNT: 2.31 X10E12/L (ref 4.5–5.9)
ERYTHROCYTES (10*6/UL) IN BLOOD BY AUTOMATED COUNT: 2.38 X10E12/L (ref 4.5–5.9)
ERYTHROCYTES (10*6/UL) IN BLOOD BY AUTOMATED COUNT: 2.44 X10E12/L (ref 4.5–5.9)
ERYTHROCYTES (10*6/UL) IN BLOOD BY AUTOMATED COUNT: 2.73 X10E12/L (ref 4.5–5.9)
FERRITIN SERPL-MCNC: 111 NG/ML (ref 20–300)
FLUAV RNA RESP QL NAA+PROBE: NOT DETECTED
FLUBV RNA RESP QL NAA+PROBE: NOT DETECTED
GFR MALE: 30 ML/MIN/1.73M2
GFR MALE: 32 ML/MIN/1.73M2
GFR MALE: 32 ML/MIN/1.73M2
GFR MALE: 39 ML/MIN/1.73M2
GFR MALE: 40 ML/MIN/1.73M2
GFR MALE: 40 ML/MIN/1.73M2
GFR MALE: 43 ML/MIN/1.73M2
GFR MALE: 58 ML/MIN/1.73M2
GFR MALE: 64 ML/MIN/1.73M2
GFR SERPL CREATININE-BSD FRML MDRD: 20 ML/MIN/1.73M*2
GFR SERPL CREATININE-BSD FRML MDRD: 21 ML/MIN/1.73M*2
GFR SERPL CREATININE-BSD FRML MDRD: 22 ML/MIN/1.73M*2
GFR SERPL CREATININE-BSD FRML MDRD: 23 ML/MIN/1.73M*2
GFR SERPL CREATININE-BSD FRML MDRD: 24 ML/MIN/1.73M*2
GFR SERPL CREATININE-BSD FRML MDRD: 25 ML/MIN/1.73M*2
GFR SERPL CREATININE-BSD FRML MDRD: 26 ML/MIN/1.73M*2
GFR SERPL CREATININE-BSD FRML MDRD: 27 ML/MIN/1.73M*2
GFR SERPL CREATININE-BSD FRML MDRD: 27 ML/MIN/1.73M*2
GFR SERPL CREATININE-BSD FRML MDRD: 28 ML/MIN/1.73M*2
GFR SERPL CREATININE-BSD FRML MDRD: 29 ML/MIN/1.73M*2
GFR SERPL CREATININE-BSD FRML MDRD: 30 ML/MIN/1.73M*2
GFR SERPL CREATININE-BSD FRML MDRD: 31 ML/MIN/1.73M*2
GFR SERPL CREATININE-BSD FRML MDRD: 32 ML/MIN/1.73M*2
GFR SERPL CREATININE-BSD FRML MDRD: 34 ML/MIN/1.73M*2
GFR SERPL CREATININE-BSD FRML MDRD: 34 ML/MIN/1.73M*2
GLOBAL LONGITUDINAL STRAIN: -13.2
GLUCOSE (MG/DL) IN SER/PLAS: 108 MG/DL (ref 74–99)
GLUCOSE (MG/DL) IN SER/PLAS: 111 MG/DL (ref 74–99)
GLUCOSE (MG/DL) IN SER/PLAS: 122 MG/DL (ref 74–99)
GLUCOSE (MG/DL) IN SER/PLAS: 125 MG/DL (ref 74–99)
GLUCOSE (MG/DL) IN SER/PLAS: 82 MG/DL (ref 74–99)
GLUCOSE (MG/DL) IN SER/PLAS: 84 MG/DL (ref 74–99)
GLUCOSE (MG/DL) IN SER/PLAS: 91 MG/DL (ref 74–99)
GLUCOSE (MG/DL) IN SER/PLAS: 91 MG/DL (ref 74–99)
GLUCOSE (MG/DL) IN SER/PLAS: 94 MG/DL (ref 74–99)
GLUCOSE SERPL-MCNC: 100 MG/DL (ref 74–99)
GLUCOSE SERPL-MCNC: 103 MG/DL (ref 74–99)
GLUCOSE SERPL-MCNC: 105 MG/DL (ref 74–99)
GLUCOSE SERPL-MCNC: 112 MG/DL (ref 74–99)
GLUCOSE SERPL-MCNC: 132 MG/DL (ref 74–99)
GLUCOSE SERPL-MCNC: 137 MG/DL (ref 74–99)
GLUCOSE SERPL-MCNC: 72 MG/DL (ref 74–99)
GLUCOSE SERPL-MCNC: 78 MG/DL (ref 74–99)
GLUCOSE SERPL-MCNC: 79 MG/DL (ref 74–99)
GLUCOSE SERPL-MCNC: 81 MG/DL (ref 74–99)
GLUCOSE SERPL-MCNC: 81 MG/DL (ref 74–99)
GLUCOSE SERPL-MCNC: 83 MG/DL (ref 74–99)
GLUCOSE SERPL-MCNC: 83 MG/DL (ref 74–99)
GLUCOSE SERPL-MCNC: 84 MG/DL (ref 74–99)
GLUCOSE SERPL-MCNC: 84 MG/DL (ref 74–99)
GLUCOSE SERPL-MCNC: 86 MG/DL (ref 74–99)
GLUCOSE SERPL-MCNC: 88 MG/DL (ref 74–99)
GLUCOSE SERPL-MCNC: 88 MG/DL (ref 74–99)
GLUCOSE SERPL-MCNC: 90 MG/DL (ref 74–99)
GLUCOSE SERPL-MCNC: 90 MG/DL (ref 74–99)
GLUCOSE SERPL-MCNC: 91 MG/DL (ref 74–99)
GLUCOSE SERPL-MCNC: 94 MG/DL (ref 74–99)
GLUCOSE SERPL-MCNC: 95 MG/DL (ref 74–99)
GLUCOSE SERPL-MCNC: 96 MG/DL (ref 74–99)
GLUCOSE UR STRIP.AUTO-MCNC: ABNORMAL MG/DL
GRAM STAIN: NORMAL
HCT VFR BLD AUTO: 20.3 % (ref 41–52)
HCT VFR BLD AUTO: 20.7 % (ref 41–52)
HCT VFR BLD AUTO: 20.7 % (ref 41–52)
HCT VFR BLD AUTO: 21.7 % (ref 41–52)
HCT VFR BLD AUTO: 21.7 % (ref 41–52)
HCT VFR BLD AUTO: 21.8 % (ref 41–52)
HCT VFR BLD AUTO: 21.9 % (ref 41–52)
HCT VFR BLD AUTO: 22.7 % (ref 41–52)
HCT VFR BLD AUTO: 23.1 % (ref 41–52)
HCT VFR BLD AUTO: 23.1 % (ref 41–52)
HCT VFR BLD AUTO: 23.4 % (ref 41–52)
HCT VFR BLD AUTO: 23.5 % (ref 41–52)
HCT VFR BLD AUTO: 23.6 % (ref 41–52)
HCT VFR BLD AUTO: 23.9 % (ref 41–52)
HCT VFR BLD AUTO: 24.6 % (ref 41–52)
HCT VFR BLD AUTO: 24.9 % (ref 41–52)
HCT VFR BLD AUTO: 25.2 % (ref 41–52)
HCT VFR BLD AUTO: 25.4 % (ref 41–52)
HCT VFR BLD AUTO: 25.8 % (ref 41–52)
HCT VFR BLD AUTO: 26.3 % (ref 41–52)
HCT VFR BLD AUTO: 26.3 % (ref 41–52)
HCT VFR BLD AUTO: 26.7 % (ref 41–52)
HCT VFR BLD AUTO: 28 % (ref 41–52)
HEMATOCRIT (%) IN BLOOD BY AUTOMATED COUNT: 22.5 % (ref 41–52)
HEMATOCRIT (%) IN BLOOD BY AUTOMATED COUNT: 23.4 % (ref 41–52)
HEMATOCRIT (%) IN BLOOD BY AUTOMATED COUNT: 23.5 % (ref 41–52)
HEMATOCRIT (%) IN BLOOD BY AUTOMATED COUNT: 27.3 % (ref 41–52)
HEMOCCULT SP1 STL QL: POSITIVE
HEMOGLOBIN (G/DL) IN BLOOD: 6.9 G/DL (ref 13.5–17.5)
HEMOGLOBIN (G/DL) IN BLOOD: 7.2 G/DL (ref 13.5–17.5)
HEMOGLOBIN (G/DL) IN BLOOD: 7.3 G/DL (ref 13.5–17.5)
HEMOGLOBIN (G/DL) IN BLOOD: 8.1 G/DL (ref 13.5–17.5)
HGB BLD-MCNC: 6.1 G/DL (ref 13.5–17.5)
HGB BLD-MCNC: 6.6 G/DL (ref 13.5–17.5)
HGB BLD-MCNC: 6.6 G/DL (ref 13.5–17.5)
HGB BLD-MCNC: 6.7 G/DL (ref 13.5–17.5)
HGB BLD-MCNC: 6.7 G/DL (ref 13.5–17.5)
HGB BLD-MCNC: 6.8 G/DL (ref 13.5–17.5)
HGB BLD-MCNC: 6.9 G/DL (ref 13.5–17.5)
HGB BLD-MCNC: 7 G/DL (ref 13.5–17.5)
HGB BLD-MCNC: 7.3 G/DL (ref 13.5–17.5)
HGB BLD-MCNC: 7.5 G/DL (ref 13.5–17.5)
HGB BLD-MCNC: 7.5 G/DL (ref 13.5–17.5)
HGB BLD-MCNC: 7.6 G/DL (ref 13.5–17.5)
HGB BLD-MCNC: 7.7 G/DL (ref 13.5–17.5)
HGB BLD-MCNC: 7.8 G/DL (ref 13.5–17.5)
HGB BLD-MCNC: 8.1 G/DL (ref 13.5–17.5)
HGB BLD-MCNC: 8.2 G/DL (ref 13.5–17.5)
HGB BLD-MCNC: 8.3 G/DL (ref 13.5–17.5)
HGB BLD-MCNC: 8.3 G/DL (ref 13.5–17.5)
HGB BLD-MCNC: 8.4 G/DL (ref 13.5–17.5)
HGB BLD-MCNC: 8.6 G/DL (ref 13.5–17.5)
HOLD SPECIMEN: NORMAL
HYPOCHROMIA (PRESENCE) IN BLOOD BY LIGHT MICROSCOPY: NORMAL
HYPOCHROMIA BLD QL SMEAR: ABNORMAL
IMM GRANULOCYTES # BLD AUTO: 0.02 X10*3/UL (ref 0–0.5)
IMM GRANULOCYTES # BLD AUTO: 0.04 X10*3/UL (ref 0–0.5)
IMM GRANULOCYTES # BLD AUTO: 0.06 X10*3/UL (ref 0–0.5)
IMM GRANULOCYTES # BLD AUTO: 0.08 X10*3/UL (ref 0–0.5)
IMM GRANULOCYTES NFR BLD AUTO: 0.5 % (ref 0–0.9)
IMM GRANULOCYTES NFR BLD AUTO: 0.6 % (ref 0–0.9)
IMM GRANULOCYTES NFR BLD AUTO: 1 % (ref 0–0.9)
IMM GRANULOCYTES NFR BLD AUTO: 1.4 % (ref 0–0.9)
INR PPP: 1.2 (ref 0.9–1.1)
IRON SATN MFR SERPL: 10 % (ref 25–45)
IRON SERPL-MCNC: 47 UG/DL (ref 35–150)
KETONES UR STRIP.AUTO-MCNC: NEGATIVE MG/DL
LACTATE SERPL-SCNC: 1.7 MMOL/L (ref 0.4–2)
LACTATE SERPL-SCNC: 1.9 MMOL/L (ref 0.4–2)
LEFT VENTRICLE INTERNAL DIMENSION DIASTOLE: 4.64 (ref 3.5–6)
LEUKOCYTE ESTERASE UR QL STRIP.AUTO: NEGATIVE
LEUKOCYTES (/UL) IN BODY FLUID: 984 /UL
LEUKOCYTES (10*3/UL) IN BLOOD BY AUTOMATED COUNT: 3.8 X10E9/L (ref 4.4–11.3)
LEUKOCYTES (10*3/UL) IN BLOOD BY AUTOMATED COUNT: 4 X10E9/L (ref 4.4–11.3)
LEUKOCYTES (10*3/UL) IN BLOOD BY AUTOMATED COUNT: 4.2 X10E9/L (ref 4.4–11.3)
LEUKOCYTES (10*3/UL) IN BLOOD BY AUTOMATED COUNT: 5.3 X10E9/L (ref 4.4–11.3)
LYMPHOCYTES # BLD AUTO: 0.17 X10*3/UL (ref 0.8–3)
LYMPHOCYTES # BLD AUTO: 0.5 X10*3/UL (ref 0.8–3)
LYMPHOCYTES # BLD AUTO: 0.72 X10*3/UL (ref 0.8–3)
LYMPHOCYTES # BLD MANUAL: 0.35 X10*3/UL (ref 0.8–3)
LYMPHOCYTES NFR BLD AUTO: 1.7 %
LYMPHOCYTES NFR BLD AUTO: 11.3 %
LYMPHOCYTES NFR BLD AUTO: 18.8 %
LYMPHOCYTES NFR BLD MANUAL: 6 %
LYMPHOCYTES/100 LEUKOCYTES IN BODY FLUID BY MAN CT: 16 %
MAGNESIUM (MG/DL) IN SER/PLAS: 1.88 MG/DL (ref 1.6–2.4)
MAGNESIUM (MG/DL) IN SER/PLAS: 2.18 MG/DL (ref 1.6–2.4)
MAGNESIUM (MG/DL) IN SER/PLAS: 2.22 MG/DL (ref 1.6–2.4)
MAGNESIUM SERPL-MCNC: 1.95 MG/DL (ref 1.6–2.4)
MAGNESIUM SERPL-MCNC: 1.98 MG/DL (ref 1.6–2.4)
MAGNESIUM SERPL-MCNC: 2.03 MG/DL (ref 1.6–2.4)
MAGNESIUM SERPL-MCNC: 2.05 MG/DL (ref 1.6–2.4)
MAGNESIUM SERPL-MCNC: 2.2 MG/DL (ref 1.6–2.4)
MAGNESIUM SERPL-MCNC: 2.28 MG/DL (ref 1.6–2.4)
MAGNESIUM SERPL-MCNC: 2.31 MG/DL (ref 1.6–2.4)
MAGNESIUM SERPL-MCNC: 2.35 MG/DL (ref 1.6–2.4)
MAGNESIUM SERPL-MCNC: 2.38 MG/DL (ref 1.6–2.4)
MAGNESIUM SERPL-MCNC: 2.46 MG/DL (ref 1.6–2.4)
MAGNESIUM SERPL-MCNC: 2.57 MG/DL (ref 1.6–2.4)
MCH RBC QN AUTO: 28.8 PG (ref 26–34)
MCH RBC QN AUTO: 29.7 PG (ref 26–34)
MCH RBC QN AUTO: 29.8 PG (ref 26–34)
MCH RBC QN AUTO: 29.9 PG (ref 26–34)
MCH RBC QN AUTO: 29.9 PG (ref 26–34)
MCH RBC QN AUTO: 30 PG (ref 26–34)
MCH RBC QN AUTO: 30.2 PG (ref 26–34)
MCH RBC QN AUTO: 30.2 PG (ref 26–34)
MCH RBC QN AUTO: 30.4 PG (ref 26–34)
MCH RBC QN AUTO: 30.5 PG (ref 26–34)
MCH RBC QN AUTO: 30.6 PG (ref 26–34)
MCH RBC QN AUTO: 30.7 PG (ref 26–34)
MCH RBC QN AUTO: 30.8 PG (ref 26–34)
MCH RBC QN AUTO: 30.9 PG (ref 26–34)
MCH RBC QN AUTO: 31.2 PG (ref 26–34)
MCHC RBC AUTO-ENTMCNC: 29.7 G/DL (ref 32–36)
MCHC RBC AUTO-ENTMCNC: 30 G/DL (ref 32–36)
MCHC RBC AUTO-ENTMCNC: 30.2 G/DL (ref 32–36)
MCHC RBC AUTO-ENTMCNC: 30.6 G/DL (ref 32–36)
MCHC RBC AUTO-ENTMCNC: 30.7 G/DL (ref 32–36)
MCHC RBC AUTO-ENTMCNC: 30.8 G/DL (ref 32–36)
MCHC RBC AUTO-ENTMCNC: 30.9 G/DL (ref 32–36)
MCHC RBC AUTO-ENTMCNC: 30.9 G/DL (ref 32–36)
MCHC RBC AUTO-ENTMCNC: 31.1 G/DL (ref 32–36)
MCHC RBC AUTO-ENTMCNC: 31.1 G/DL (ref 32–36)
MCHC RBC AUTO-ENTMCNC: 31.3 G/DL (ref 32–36)
MCHC RBC AUTO-ENTMCNC: 31.3 G/DL (ref 32–36)
MCHC RBC AUTO-ENTMCNC: 31.4 G/DL (ref 32–36)
MCHC RBC AUTO-ENTMCNC: 31.6 G/DL (ref 32–36)
MCHC RBC AUTO-ENTMCNC: 31.6 G/DL (ref 32–36)
MCHC RBC AUTO-ENTMCNC: 31.7 G/DL (ref 32–36)
MCHC RBC AUTO-ENTMCNC: 31.9 G/DL (ref 32–36)
MCHC RBC AUTO-ENTMCNC: 31.9 G/DL (ref 32–36)
MCHC RBC AUTO-ENTMCNC: 32.2 G/DL (ref 32–36)
MCHC RBC AUTO-ENTMCNC: 32.3 G/DL (ref 32–36)
MCV RBC AUTO: 100 FL (ref 80–100)
MCV RBC AUTO: 102 FL (ref 80–100)
MCV RBC AUTO: 102 FL (ref 80–100)
MCV RBC AUTO: 104 FL (ref 80–100)
MCV RBC AUTO: 92 FL (ref 80–100)
MCV RBC AUTO: 93 FL (ref 80–100)
MCV RBC AUTO: 94 FL (ref 80–100)
MCV RBC AUTO: 94 FL (ref 80–100)
MCV RBC AUTO: 95 FL (ref 80–100)
MCV RBC AUTO: 95 FL (ref 80–100)
MCV RBC AUTO: 96 FL (ref 80–100)
MCV RBC AUTO: 97 FL (ref 80–100)
MCV RBC AUTO: 98 FL (ref 80–100)
MCV RBC AUTO: 98 FL (ref 80–100)
MCV RBC AUTO: 99 FL (ref 80–100)
METAMYELOCYTES # BLD MANUAL: 0.12 X10*3/UL
METAMYELOCYTES NFR BLD MANUAL: 2 %
MONOCYTES # BLD AUTO: 0.39 X10*3/UL (ref 0.05–0.8)
MONOCYTES # BLD AUTO: 0.44 X10*3/UL (ref 0.05–0.8)
MONOCYTES # BLD AUTO: 0.46 X10*3/UL (ref 0.05–0.8)
MONOCYTES # BLD MANUAL: 0.23 X10*3/UL (ref 0.05–0.8)
MONOCYTES NFR BLD AUTO: 11.5 %
MONOCYTES NFR BLD AUTO: 4.5 %
MONOCYTES NFR BLD AUTO: 8.8 %
MONOCYTES NFR BLD MANUAL: 4 %
MONOCYTES+MACROPHAGES/100 WBC IN BODY FLUID BY MAN CT: 54 %
NATRIURETIC PEPTIDE B (PG/ML) IN SER/PLAS: 251 PG/ML (ref 0–99)
NEUTROPHILS # BLD AUTO: 2.5 X10*3/UL (ref 1.6–5.5)
NEUTROPHILS # BLD AUTO: 3.44 X10*3/UL (ref 1.6–5.5)
NEUTROPHILS # BLD AUTO: 9.43 X10*3/UL (ref 1.6–5.5)
NEUTROPHILS # BLD MANUAL: 4.87 X10*3/UL (ref 1.6–5.5)
NEUTROPHILS NFR BLD AUTO: 65.3 %
NEUTROPHILS NFR BLD AUTO: 77.5 %
NEUTROPHILS NFR BLD AUTO: 93 %
NEUTROPHILS/100 LEUKOCYTES IN BODY FLUID BY MANUAL COUNT: 29 %
NEUTS BAND # BLD MANUAL: 0.35 X10*3/UL (ref 0–0.5)
NEUTS BAND NFR BLD MANUAL: 6 %
NEUTS SEG # BLD MANUAL: 4.52 X10*3/UL (ref 1.6–5)
NEUTS SEG NFR BLD MANUAL: 78 %
NITRITE UR QL STRIP.AUTO: NEGATIVE
NRBC BLD-RTO: 0 /100 WBCS (ref 0–0)
NRBC BLD-RTO: 0.5 /100 WBCS (ref 0–0)
OVALOCYTES BLD QL SMEAR: ABNORMAL
OVALOCYTES BLD QL SMEAR: NORMAL
OVALOCYTES PRESENCE IN BLOOD BY LIGHT MICROSCOPY: NORMAL
P AXIS: -7 DEGREES
PH UR STRIP.AUTO: 6 [PH]
PHOSPHATE (MG/DL) IN SER/PLAS: 3.3 MG/DL (ref 2.5–4.9)
PHOSPHATE SERPL-MCNC: 3.7 MG/DL (ref 2.5–4.9)
PHOSPHATE SERPL-MCNC: 4.1 MG/DL (ref 2.5–4.9)
PLATELET # BLD AUTO: 101 X10*3/UL (ref 150–450)
PLATELET # BLD AUTO: 106 X10*3/UL (ref 150–450)
PLATELET # BLD AUTO: 108 X10*3/UL (ref 150–450)
PLATELET # BLD AUTO: 111 X10*3/UL (ref 150–450)
PLATELET # BLD AUTO: 118 X10*3/UL (ref 150–450)
PLATELET # BLD AUTO: 120 X10*3/UL (ref 150–450)
PLATELET # BLD AUTO: 125 X10*3/UL (ref 150–450)
PLATELET # BLD AUTO: 127 X10*3/UL (ref 150–450)
PLATELET # BLD AUTO: 128 X10*3/UL (ref 150–450)
PLATELET # BLD AUTO: 129 X10*3/UL (ref 150–450)
PLATELET # BLD AUTO: 129 X10*3/UL (ref 150–450)
PLATELET # BLD AUTO: 130 X10*3/UL (ref 150–450)
PLATELET # BLD AUTO: 130 X10*3/UL (ref 150–450)
PLATELET # BLD AUTO: 133 X10*3/UL (ref 150–450)
PLATELET # BLD AUTO: 140 X10*3/UL (ref 150–450)
PLATELET # BLD AUTO: 81 X10*3/UL (ref 150–450)
PLATELET # BLD AUTO: 86 X10*3/UL (ref 150–450)
PLATELET # BLD AUTO: 91 X10*3/UL (ref 150–450)
PLATELET # BLD AUTO: 94 X10*3/UL (ref 150–450)
PLATELET # BLD AUTO: 99 X10*3/UL (ref 150–450)
PLATELETS (10*3/UL) IN BLOOD AUTOMATED COUNT: 101 X10E9/L (ref 150–450)
PLATELETS (10*3/UL) IN BLOOD AUTOMATED COUNT: 117 X10E9/L (ref 150–450)
PLATELETS (10*3/UL) IN BLOOD AUTOMATED COUNT: 119 X10E9/L (ref 150–450)
PLATELETS (10*3/UL) IN BLOOD AUTOMATED COUNT: 125 X10E9/L (ref 150–450)
PMV BLD AUTO: 10 FL (ref 7.5–11.5)
PMV BLD AUTO: 10.1 FL (ref 7.5–11.5)
PMV BLD AUTO: 10.2 FL (ref 7.5–11.5)
PMV BLD AUTO: 10.3 FL (ref 7.5–11.5)
PMV BLD AUTO: 10.3 FL (ref 7.5–11.5)
PMV BLD AUTO: 10.4 FL (ref 7.5–11.5)
PMV BLD AUTO: 10.4 FL (ref 7.5–11.5)
PMV BLD AUTO: 10.5 FL (ref 7.5–11.5)
PMV BLD AUTO: 10.6 FL (ref 7.5–11.5)
PMV BLD AUTO: 9.5 FL (ref 7.5–11.5)
PMV BLD AUTO: 9.7 FL (ref 7.5–11.5)
PMV BLD AUTO: 9.8 FL (ref 7.5–11.5)
POLYCHROMASIA BLD QL SMEAR: NORMAL
POLYCHROMASIA IN BLOOD BY LIGHT MICROSCOPY: NORMAL
POTASSIUM (MMOL/L) IN SER/PLAS: 3.4 MMOL/L (ref 3.5–5.3)
POTASSIUM (MMOL/L) IN SER/PLAS: 3.6 MMOL/L (ref 3.5–5.3)
POTASSIUM (MMOL/L) IN SER/PLAS: 3.6 MMOL/L (ref 3.5–5.3)
POTASSIUM (MMOL/L) IN SER/PLAS: 3.8 MMOL/L (ref 3.5–5.3)
POTASSIUM (MMOL/L) IN SER/PLAS: 3.9 MMOL/L (ref 3.5–5.3)
POTASSIUM (MMOL/L) IN SER/PLAS: 4.1 MMOL/L (ref 3.5–5.3)
POTASSIUM (MMOL/L) IN SER/PLAS: 4.2 MMOL/L (ref 3.5–5.3)
POTASSIUM (MMOL/L) IN SER/PLAS: 4.3 MMOL/L (ref 3.5–5.3)
POTASSIUM (MMOL/L) IN SER/PLAS: 4.9 MMOL/L (ref 3.5–5.3)
POTASSIUM SERPL-SCNC: 2.4 MMOL/L (ref 3.5–5.3)
POTASSIUM SERPL-SCNC: 2.5 MMOL/L (ref 3.5–5.3)
POTASSIUM SERPL-SCNC: 2.7 MMOL/L (ref 3.5–5.3)
POTASSIUM SERPL-SCNC: 3 MMOL/L (ref 3.5–5.3)
POTASSIUM SERPL-SCNC: 3 MMOL/L (ref 3.5–5.3)
POTASSIUM SERPL-SCNC: 3.1 MMOL/L (ref 3.5–5.3)
POTASSIUM SERPL-SCNC: 3.1 MMOL/L (ref 3.5–5.3)
POTASSIUM SERPL-SCNC: 3.3 MMOL/L (ref 3.5–5.3)
POTASSIUM SERPL-SCNC: 3.4 MMOL/L (ref 3.5–5.3)
POTASSIUM SERPL-SCNC: 3.4 MMOL/L (ref 3.5–5.3)
POTASSIUM SERPL-SCNC: 3.5 MMOL/L (ref 3.5–5.3)
POTASSIUM SERPL-SCNC: 3.5 MMOL/L (ref 3.5–5.3)
POTASSIUM SERPL-SCNC: 3.7 MMOL/L (ref 3.5–5.3)
POTASSIUM SERPL-SCNC: 3.7 MMOL/L (ref 3.5–5.3)
POTASSIUM SERPL-SCNC: 3.8 MMOL/L (ref 3.5–5.3)
POTASSIUM SERPL-SCNC: 4.1 MMOL/L (ref 3.5–5.3)
POTASSIUM SERPL-SCNC: 4.2 MMOL/L (ref 3.5–5.3)
POTASSIUM SERPL-SCNC: 4.5 MMOL/L (ref 3.5–5.3)
POTASSIUM SERPL-SCNC: 4.6 MMOL/L (ref 3.5–5.3)
POTASSIUM SERPL-SCNC: 4.7 MMOL/L (ref 3.5–5.3)
POTASSIUM SERPL-SCNC: 5 MMOL/L (ref 3.5–5.3)
POTASSIUM SERPL-SCNC: 5.1 MMOL/L (ref 3.5–5.3)
POTASSIUM SERPL-SCNC: 5.2 MMOL/L (ref 3.5–5.3)
POTASSIUM SERPL-SCNC: 5.3 MMOL/L (ref 3.5–5.3)
POTASSIUM SERPL-SCNC: 5.4 MMOL/L (ref 3.5–5.3)
POTASSIUM SERPL-SCNC: 6.5 MMOL/L (ref 3.5–5.3)
PR INTERVAL: 229 MS
PRODUCT BLOOD TYPE: 5100
PRODUCT CODE: NORMAL
PROT SERPL-MCNC: 6 G/DL (ref 6.4–8.2)
PROT SERPL-MCNC: 7 G/DL (ref 6.4–8.2)
PROT SERPL-MCNC: 7.1 G/DL (ref 6.4–8.2)
PROT SERPL-MCNC: 7.2 G/DL (ref 6.4–8.2)
PROT SERPL-MCNC: 7.6 G/DL (ref 6.4–8.2)
PROT UR STRIP.AUTO-MCNC: NEGATIVE MG/DL
PROTHROMBIN TIME: 13.1 SECONDS (ref 9.8–12.8)
Q ONSET: 249 MS
QRS COUNT: 11 BEATS
QRS DURATION: 244 MS
QT INTERVAL: 561 MS
QTC CALCULATION(BAZETT): 606 MS
QTC FREDERICIA: 590 MS
R AXIS: 264 DEGREES
RBC # BLD AUTO: 1.99 X10*6/UL (ref 4.5–5.9)
RBC # BLD AUTO: 2.2 X10*6/UL (ref 4.5–5.9)
RBC # BLD AUTO: 2.2 X10*6/UL (ref 4.5–5.9)
RBC # BLD AUTO: 2.24 X10*6/UL (ref 4.5–5.9)
RBC # BLD AUTO: 2.25 X10*6/UL (ref 4.5–5.9)
RBC # BLD AUTO: 2.26 X10*6/UL (ref 4.5–5.9)
RBC # BLD AUTO: 2.27 X10*6/UL (ref 4.5–5.9)
RBC # BLD AUTO: 2.34 X10*6/UL (ref 4.5–5.9)
RBC # BLD AUTO: 2.36 X10*6/UL (ref 4.5–5.9)
RBC # BLD AUTO: 2.37 X10*6/UL (ref 4.5–5.9)
RBC # BLD AUTO: 2.39 X10*6/UL (ref 4.5–5.9)
RBC # BLD AUTO: 2.47 X10*6/UL (ref 4.5–5.9)
RBC # BLD AUTO: 2.55 X10*6/UL (ref 4.5–5.9)
RBC # BLD AUTO: 2.57 X10*6/UL (ref 4.5–5.9)
RBC # BLD AUTO: 2.68 X10*6/UL (ref 4.5–5.9)
RBC # BLD AUTO: 2.72 X10*6/UL (ref 4.5–5.9)
RBC # BLD AUTO: 2.78 X10*6/UL (ref 4.5–5.9)
RBC # BLD AUTO: 2.81 X10*6/UL (ref 4.5–5.9)
RBC # BLD AUTO: 2.82 X10*6/UL (ref 4.5–5.9)
RBC # BLD AUTO: 2.9 X10*6/UL (ref 4.5–5.9)
RBC # UR STRIP.AUTO: NEGATIVE /UL
RBC MORPH BLD: ABNORMAL
RBC MORPH BLD: NORMAL
RBC MORPHOLOGY IN BLOOD: NORMAL
RH FACTOR (ANTIGEN D): NORMAL
RH FACTOR: NORMAL
SARS-COV-2 RNA RESP QL NAA+PROBE: DETECTED
SARS-COV-2 RNA RESP QL NAA+PROBE: NOT DETECTED
SEDIMENTATION RATE, ERYTHROCYTE: 25 MM/H (ref 0–20)
SODIUM (MMOL/L) IN SER/PLAS: 132 MMOL/L (ref 136–145)
SODIUM (MMOL/L) IN SER/PLAS: 134 MMOL/L (ref 136–145)
SODIUM (MMOL/L) IN SER/PLAS: 135 MMOL/L (ref 136–145)
SODIUM (MMOL/L) IN SER/PLAS: 135 MMOL/L (ref 136–145)
SODIUM (MMOL/L) IN SER/PLAS: 136 MMOL/L (ref 136–145)
SODIUM (MMOL/L) IN SER/PLAS: 137 MMOL/L (ref 136–145)
SODIUM (MMOL/L) IN SER/PLAS: 137 MMOL/L (ref 136–145)
SODIUM SERPL-SCNC: 129 MMOL/L (ref 136–145)
SODIUM SERPL-SCNC: 131 MMOL/L (ref 136–145)
SODIUM SERPL-SCNC: 132 MMOL/L (ref 136–145)
SODIUM SERPL-SCNC: 133 MMOL/L (ref 136–145)
SODIUM SERPL-SCNC: 134 MMOL/L (ref 136–145)
SODIUM SERPL-SCNC: 135 MMOL/L (ref 136–145)
SP GR UR STRIP.AUTO: 1.01
T AXIS: 99 DEGREES
T OFFSET: 530 MS
TARGET CELLS IN BLOOD BY LIGHT MICROSCOPY: NORMAL
TIBC SERPL-MCNC: 472 UG/DL (ref 240–445)
TISSUE/WOUND CULTURE/SMEAR: NORMAL
TOTAL CELLS COUNTED BLD: 100
UIBC SERPL-MCNC: 425 UG/DL (ref 110–370)
UNIT ABO: NORMAL
UNIT NUMBER: NORMAL
UNIT RH: NORMAL
UNIT VOLUME: 278
UNIT VOLUME: 350
UNIT VOLUME: 400
UNIT VOLUME: 400
UREA NITROGEN (MG/DL) IN SER/PLAS: 32 MG/DL (ref 6–23)
UREA NITROGEN (MG/DL) IN SER/PLAS: 33 MG/DL (ref 6–23)
UREA NITROGEN (MG/DL) IN SER/PLAS: 46 MG/DL (ref 6–23)
UREA NITROGEN (MG/DL) IN SER/PLAS: 46 MG/DL (ref 6–23)
UREA NITROGEN (MG/DL) IN SER/PLAS: 48 MG/DL (ref 6–23)
UREA NITROGEN (MG/DL) IN SER/PLAS: 49 MG/DL (ref 6–23)
UREA NITROGEN (MG/DL) IN SER/PLAS: 53 MG/DL (ref 6–23)
UREA NITROGEN (MG/DL) IN SER/PLAS: 70 MG/DL (ref 6–23)
UREA NITROGEN (MG/DL) IN SER/PLAS: 87 MG/DL (ref 6–23)
UROBILINOGEN UR STRIP.AUTO-MCNC: <2 MG/DL
VENTRICULAR RATE: 70 BPM
WBC # BLD AUTO: 10.1 X10*3/UL (ref 4.4–11.3)
WBC # BLD AUTO: 2.9 X10*3/UL (ref 4.4–11.3)
WBC # BLD AUTO: 3.1 X10*3/UL (ref 4.4–11.3)
WBC # BLD AUTO: 3.2 X10*3/UL (ref 4.4–11.3)
WBC # BLD AUTO: 3.3 X10*3/UL (ref 4.4–11.3)
WBC # BLD AUTO: 3.3 X10*3/UL (ref 4.4–11.3)
WBC # BLD AUTO: 3.4 X10*3/UL (ref 4.4–11.3)
WBC # BLD AUTO: 3.5 X10*3/UL (ref 4.4–11.3)
WBC # BLD AUTO: 3.6 X10*3/UL (ref 4.4–11.3)
WBC # BLD AUTO: 3.6 X10*3/UL (ref 4.4–11.3)
WBC # BLD AUTO: 3.7 X10*3/UL (ref 4.4–11.3)
WBC # BLD AUTO: 3.8 X10*3/UL (ref 4.4–11.3)
WBC # BLD AUTO: 3.9 X10*3/UL (ref 4.4–11.3)
WBC # BLD AUTO: 4 X10*3/UL (ref 4.4–11.3)
WBC # BLD AUTO: 4.1 X10*3/UL (ref 4.4–11.3)
WBC # BLD AUTO: 4.2 X10*3/UL (ref 4.4–11.3)
WBC # BLD AUTO: 4.4 X10*3/UL (ref 4.4–11.3)
WBC # BLD AUTO: 5.8 X10*3/UL (ref 4.4–11.3)
XM INTEP: NORMAL

## 2023-01-01 PROCEDURE — 1200000002 HC GENERAL ROOM WITH TELEMETRY DAILY

## 2023-01-01 PROCEDURE — 2500000001 HC RX 250 WO HCPCS SELF ADMINISTERED DRUGS (ALT 637 FOR MEDICARE OP): Performed by: INTERNAL MEDICINE

## 2023-01-01 PROCEDURE — 84484 ASSAY OF TROPONIN QUANT: CPT | Performed by: STUDENT IN AN ORGANIZED HEALTH CARE EDUCATION/TRAINING PROGRAM

## 2023-01-01 PROCEDURE — 43255 EGD CONTROL BLEEDING ANY: CPT | Performed by: INTERNAL MEDICINE

## 2023-01-01 PROCEDURE — 83605 ASSAY OF LACTIC ACID: CPT | Performed by: EMERGENCY MEDICINE

## 2023-01-01 PROCEDURE — 36415 COLL VENOUS BLD VENIPUNCTURE: CPT

## 2023-01-01 PROCEDURE — 1160F RVW MEDS BY RX/DR IN RCRD: CPT | Performed by: INTERNAL MEDICINE

## 2023-01-01 PROCEDURE — 2500000001 HC RX 250 WO HCPCS SELF ADMINISTERED DRUGS (ALT 637 FOR MEDICARE OP): Performed by: PHYSICIAN ASSISTANT

## 2023-01-01 PROCEDURE — 80048 BASIC METABOLIC PNL TOTAL CA: CPT | Performed by: INTERNAL MEDICINE

## 2023-01-01 PROCEDURE — 85027 COMPLETE CBC AUTOMATED: CPT | Performed by: INTERNAL MEDICINE

## 2023-01-01 PROCEDURE — 2500000004 HC RX 250 GENERAL PHARMACY W/ HCPCS (ALT 636 FOR OP/ED): Performed by: INTERNAL MEDICINE

## 2023-01-01 PROCEDURE — 0D568ZZ DESTRUCTION OF STOMACH, VIA NATURAL OR ARTIFICIAL OPENING ENDOSCOPIC: ICD-10-PCS | Performed by: INTERNAL MEDICINE

## 2023-01-01 PROCEDURE — 84484 ASSAY OF TROPONIN QUANT: CPT | Performed by: EMERGENCY MEDICINE

## 2023-01-01 PROCEDURE — 2500000004 HC RX 250 GENERAL PHARMACY W/ HCPCS (ALT 636 FOR OP/ED): Performed by: PHYSICIAN ASSISTANT

## 2023-01-01 PROCEDURE — 83735 ASSAY OF MAGNESIUM: CPT | Performed by: INTERNAL MEDICINE

## 2023-01-01 PROCEDURE — 1111F DSCHRG MED/CURRENT MED MERGE: CPT | Performed by: STUDENT IN AN ORGANIZED HEALTH CARE EDUCATION/TRAINING PROGRAM

## 2023-01-01 PROCEDURE — 85027 COMPLETE CBC AUTOMATED: CPT

## 2023-01-01 PROCEDURE — 86901 BLOOD TYPING SEROLOGIC RH(D): CPT | Performed by: ANESTHESIOLOGY

## 2023-01-01 PROCEDURE — 36415 COLL VENOUS BLD VENIPUNCTURE: CPT | Performed by: ANESTHESIOLOGY

## 2023-01-01 PROCEDURE — 84100 ASSAY OF PHOSPHORUS: CPT | Performed by: STUDENT IN AN ORGANIZED HEALTH CARE EDUCATION/TRAINING PROGRAM

## 2023-01-01 PROCEDURE — 36430 TRANSFUSION BLD/BLD COMPNT: CPT

## 2023-01-01 PROCEDURE — 99232 SBSQ HOSP IP/OBS MODERATE 35: CPT | Performed by: NURSE PRACTITIONER

## 2023-01-01 PROCEDURE — 7100000001 HC RECOVERY ROOM TIME - INITIAL BASE CHARGE: Performed by: INTERNAL MEDICINE

## 2023-01-01 PROCEDURE — 99213 OFFICE O/P EST LOW 20 MIN: CPT | Performed by: NURSE PRACTITIONER

## 2023-01-01 PROCEDURE — 1160F RVW MEDS BY RX/DR IN RCRD: CPT | Performed by: NURSE PRACTITIONER

## 2023-01-01 PROCEDURE — 2500000004 HC RX 250 GENERAL PHARMACY W/ HCPCS (ALT 636 FOR OP/ED): Performed by: STUDENT IN AN ORGANIZED HEALTH CARE EDUCATION/TRAINING PROGRAM

## 2023-01-01 PROCEDURE — 3074F SYST BP LT 130 MM HG: CPT | Performed by: NURSE PRACTITIONER

## 2023-01-01 PROCEDURE — 99223 1ST HOSP IP/OBS HIGH 75: CPT | Performed by: STUDENT IN AN ORGANIZED HEALTH CARE EDUCATION/TRAINING PROGRAM

## 2023-01-01 PROCEDURE — 1159F MED LIST DOCD IN RCRD: CPT | Performed by: NURSE PRACTITIONER

## 2023-01-01 PROCEDURE — 36415 COLL VENOUS BLD VENIPUNCTURE: CPT | Performed by: EMERGENCY MEDICINE

## 2023-01-01 PROCEDURE — 3078F DIAST BP <80 MM HG: CPT | Performed by: STUDENT IN AN ORGANIZED HEALTH CARE EDUCATION/TRAINING PROGRAM

## 2023-01-01 PROCEDURE — 1111F DSCHRG MED/CURRENT MED MERGE: CPT | Performed by: INTERNAL MEDICINE

## 2023-01-01 PROCEDURE — 80048 BASIC METABOLIC PNL TOTAL CA: CPT

## 2023-01-01 PROCEDURE — 82374 ASSAY BLOOD CARBON DIOXIDE: CPT | Performed by: NURSE PRACTITIONER

## 2023-01-01 PROCEDURE — 85014 HEMATOCRIT: CPT | Mod: 59 | Performed by: PHYSICIAN ASSISTANT

## 2023-01-01 PROCEDURE — 86850 RBC ANTIBODY SCREEN: CPT | Performed by: EMERGENCY MEDICINE

## 2023-01-01 PROCEDURE — 80069 RENAL FUNCTION PANEL: CPT

## 2023-01-01 PROCEDURE — 82270 OCCULT BLOOD FECES: CPT | Performed by: INTERNAL MEDICINE

## 2023-01-01 PROCEDURE — 83735 ASSAY OF MAGNESIUM: CPT | Performed by: NURSE PRACTITIONER

## 2023-01-01 PROCEDURE — 1036F TOBACCO NON-USER: CPT | Performed by: NURSE PRACTITIONER

## 2023-01-01 PROCEDURE — 84460 ALANINE AMINO (ALT) (SGPT): CPT | Performed by: NURSE PRACTITIONER

## 2023-01-01 PROCEDURE — 84132 ASSAY OF SERUM POTASSIUM: CPT | Performed by: INTERNAL MEDICINE

## 2023-01-01 PROCEDURE — 99222 1ST HOSP IP/OBS MODERATE 55: CPT | Performed by: INTERNAL MEDICINE

## 2023-01-01 PROCEDURE — 71045 X-RAY EXAM CHEST 1 VIEW: CPT | Mod: FY

## 2023-01-01 PROCEDURE — 85025 COMPLETE CBC W/AUTO DIFF WBC: CPT | Performed by: EMERGENCY MEDICINE

## 2023-01-01 PROCEDURE — 83880 ASSAY OF NATRIURETIC PEPTIDE: CPT | Performed by: NURSE PRACTITIONER

## 2023-01-01 PROCEDURE — 87040 BLOOD CULTURE FOR BACTERIA: CPT | Mod: PORLAB | Performed by: INTERNAL MEDICINE

## 2023-01-01 PROCEDURE — 99232 SBSQ HOSP IP/OBS MODERATE 35: CPT | Performed by: INTERNAL MEDICINE

## 2023-01-01 PROCEDURE — 2500000001 HC RX 250 WO HCPCS SELF ADMINISTERED DRUGS (ALT 637 FOR MEDICARE OP): Performed by: STUDENT IN AN ORGANIZED HEALTH CARE EDUCATION/TRAINING PROGRAM

## 2023-01-01 PROCEDURE — 99214 OFFICE O/P EST MOD 30 MIN: CPT | Mod: PO | Performed by: INTERNAL MEDICINE

## 2023-01-01 PROCEDURE — 96375 TX/PRO/DX INJ NEW DRUG ADDON: CPT | Mod: INF

## 2023-01-01 PROCEDURE — 1126F AMNT PAIN NOTED NONE PRSNT: CPT | Performed by: NURSE PRACTITIONER

## 2023-01-01 PROCEDURE — 80048 BASIC METABOLIC PNL TOTAL CA: CPT | Performed by: STUDENT IN AN ORGANIZED HEALTH CARE EDUCATION/TRAINING PROGRAM

## 2023-01-01 PROCEDURE — 83880 ASSAY OF NATRIURETIC PEPTIDE: CPT | Performed by: STUDENT IN AN ORGANIZED HEALTH CARE EDUCATION/TRAINING PROGRAM

## 2023-01-01 PROCEDURE — 3074F SYST BP LT 130 MM HG: CPT | Performed by: INTERNAL MEDICINE

## 2023-01-01 PROCEDURE — 3600000002 HC OR TIME - INITIAL BASE CHARGE - PROCEDURE LEVEL TWO: Performed by: INTERNAL MEDICINE

## 2023-01-01 PROCEDURE — 3078F DIAST BP <80 MM HG: CPT | Performed by: NURSE PRACTITIONER

## 2023-01-01 PROCEDURE — 3078F DIAST BP <80 MM HG: CPT | Performed by: INTERNAL MEDICINE

## 2023-01-01 PROCEDURE — 2500000004 HC RX 250 GENERAL PHARMACY W/ HCPCS (ALT 636 FOR OP/ED): Performed by: ANESTHESIOLOGY

## 2023-01-01 PROCEDURE — 93005 ELECTROCARDIOGRAM TRACING: CPT

## 2023-01-01 PROCEDURE — 85027 COMPLETE CBC AUTOMATED: CPT | Performed by: STUDENT IN AN ORGANIZED HEALTH CARE EDUCATION/TRAINING PROGRAM

## 2023-01-01 PROCEDURE — 36415 COLL VENOUS BLD VENIPUNCTURE: CPT | Performed by: INTERNAL MEDICINE

## 2023-01-01 PROCEDURE — 93356 MYOCRD STRAIN IMG SPCKL TRCK: CPT

## 2023-01-01 PROCEDURE — 99233 SBSQ HOSP IP/OBS HIGH 50: CPT | Performed by: INTERNAL MEDICINE

## 2023-01-01 PROCEDURE — 99213 OFFICE O/P EST LOW 20 MIN: CPT | Mod: ZK,25 | Performed by: NURSE PRACTITIONER

## 2023-01-01 PROCEDURE — 85027 COMPLETE CBC AUTOMATED: CPT | Performed by: NURSE PRACTITIONER

## 2023-01-01 PROCEDURE — 1126F AMNT PAIN NOTED NONE PRSNT: CPT | Performed by: INTERNAL MEDICINE

## 2023-01-01 PROCEDURE — 93010 ELECTROCARDIOGRAM REPORT: CPT | Performed by: STUDENT IN AN ORGANIZED HEALTH CARE EDUCATION/TRAINING PROGRAM

## 2023-01-01 PROCEDURE — 86901 BLOOD TYPING SEROLOGIC RH(D): CPT | Performed by: INTERNAL MEDICINE

## 2023-01-01 PROCEDURE — 2500000002 HC RX 250 W HCPCS SELF ADMINISTERED DRUGS (ALT 637 FOR MEDICARE OP, ALT 636 FOR OP/ED): Performed by: INTERNAL MEDICINE

## 2023-01-01 PROCEDURE — 2060000001 HC INTERMEDIATE ICU ROOM DAILY

## 2023-01-01 PROCEDURE — 3600000007 HC OR TIME - EACH INCREMENTAL 1 MINUTE - PROCEDURE LEVEL TWO: Performed by: INTERNAL MEDICINE

## 2023-01-01 PROCEDURE — 2500000002 HC RX 250 W HCPCS SELF ADMINISTERED DRUGS (ALT 637 FOR MEDICARE OP, ALT 636 FOR OP/ED): Performed by: STUDENT IN AN ORGANIZED HEALTH CARE EDUCATION/TRAINING PROGRAM

## 2023-01-01 PROCEDURE — 2500000004 HC RX 250 GENERAL PHARMACY W/ HCPCS (ALT 636 FOR OP/ED): Performed by: EMERGENCY MEDICINE

## 2023-01-01 PROCEDURE — 99285 EMERGENCY DEPT VISIT HI MDM: CPT | Mod: 25 | Performed by: STUDENT IN AN ORGANIZED HEALTH CARE EDUCATION/TRAINING PROGRAM

## 2023-01-01 PROCEDURE — 1111F DSCHRG MED/CURRENT MED MERGE: CPT | Performed by: NURSE PRACTITIONER

## 2023-01-01 PROCEDURE — 3700000002 HC GENERAL ANESTHESIA TIME - EACH INCREMENTAL 1 MINUTE: Performed by: INTERNAL MEDICINE

## 2023-01-01 PROCEDURE — 80048 BASIC METABOLIC PNL TOTAL CA: CPT | Performed by: NURSE PRACTITIONER

## 2023-01-01 PROCEDURE — 2500000002 HC RX 250 W HCPCS SELF ADMINISTERED DRUGS (ALT 637 FOR MEDICARE OP, ALT 636 FOR OP/ED): Performed by: PHYSICIAN ASSISTANT

## 2023-01-01 PROCEDURE — 36415 COLL VENOUS BLD VENIPUNCTURE: CPT | Performed by: NURSE PRACTITIONER

## 2023-01-01 PROCEDURE — 99213 OFFICE O/P EST LOW 20 MIN: CPT | Mod: ZK | Performed by: NURSE PRACTITIONER

## 2023-01-01 PROCEDURE — 1159F MED LIST DOCD IN RCRD: CPT | Performed by: STUDENT IN AN ORGANIZED HEALTH CARE EDUCATION/TRAINING PROGRAM

## 2023-01-01 PROCEDURE — 85025 COMPLETE CBC W/AUTO DIFF WBC: CPT | Performed by: INTERNAL MEDICINE

## 2023-01-01 PROCEDURE — 71046 X-RAY EXAM CHEST 2 VIEWS: CPT | Mod: FY,FR

## 2023-01-01 PROCEDURE — 85610 PROTHROMBIN TIME: CPT | Performed by: STUDENT IN AN ORGANIZED HEALTH CARE EDUCATION/TRAINING PROGRAM

## 2023-01-01 PROCEDURE — 3074F SYST BP LT 130 MM HG: CPT | Performed by: STUDENT IN AN ORGANIZED HEALTH CARE EDUCATION/TRAINING PROGRAM

## 2023-01-01 PROCEDURE — 96366 THER/PROPH/DIAG IV INF ADDON: CPT | Mod: INF

## 2023-01-01 PROCEDURE — 1158F ADVNC CARE PLAN TLK DOCD: CPT | Performed by: NURSE PRACTITIONER

## 2023-01-01 PROCEDURE — P9016 RBC LEUKOCYTES REDUCED: HCPCS

## 2023-01-01 PROCEDURE — 85007 BL SMEAR W/DIFF WBC COUNT: CPT | Performed by: STUDENT IN AN ORGANIZED HEALTH CARE EDUCATION/TRAINING PROGRAM

## 2023-01-01 PROCEDURE — C9113 INJ PANTOPRAZOLE SODIUM, VIA: HCPCS | Performed by: INTERNAL MEDICINE

## 2023-01-01 PROCEDURE — 2500000004 HC RX 250 GENERAL PHARMACY W/ HCPCS (ALT 636 FOR OP/ED): Performed by: NURSE ANESTHETIST, CERTIFIED REGISTERED

## 2023-01-01 PROCEDURE — 99223 1ST HOSP IP/OBS HIGH 75: CPT | Performed by: INTERNAL MEDICINE

## 2023-01-01 PROCEDURE — 86900 BLOOD TYPING SEROLOGIC ABO: CPT | Performed by: ANESTHESIOLOGY

## 2023-01-01 PROCEDURE — 86900 BLOOD TYPING SEROLOGIC ABO: CPT | Performed by: INTERNAL MEDICINE

## 2023-01-01 PROCEDURE — 3700000001 HC GENERAL ANESTHESIA TIME - INITIAL BASE CHARGE: Performed by: INTERNAL MEDICINE

## 2023-01-01 PROCEDURE — 82374 ASSAY BLOOD CARBON DIOXIDE: CPT | Performed by: STUDENT IN AN ORGANIZED HEALTH CARE EDUCATION/TRAINING PROGRAM

## 2023-01-01 PROCEDURE — 83605 ASSAY OF LACTIC ACID: CPT | Performed by: INTERNAL MEDICINE

## 2023-01-01 PROCEDURE — 99213 OFFICE O/P EST LOW 20 MIN: CPT | Mod: 27 | Performed by: NURSE PRACTITIONER

## 2023-01-01 PROCEDURE — 83735 ASSAY OF MAGNESIUM: CPT

## 2023-01-01 PROCEDURE — 96365 THER/PROPH/DIAG IV INF INIT: CPT | Mod: INF

## 2023-01-01 PROCEDURE — 82310 ASSAY OF CALCIUM: CPT | Performed by: PHYSICIAN ASSISTANT

## 2023-01-01 PROCEDURE — 80053 COMPREHEN METABOLIC PANEL: CPT | Performed by: EMERGENCY MEDICINE

## 2023-01-01 PROCEDURE — 99214 OFFICE O/P EST MOD 30 MIN: CPT | Performed by: NURSE PRACTITIONER

## 2023-01-01 PROCEDURE — 99233 SBSQ HOSP IP/OBS HIGH 50: CPT

## 2023-01-01 PROCEDURE — 82728 ASSAY OF FERRITIN: CPT | Performed by: STUDENT IN AN ORGANIZED HEALTH CARE EDUCATION/TRAINING PROGRAM

## 2023-01-01 PROCEDURE — 2500000004 HC RX 250 GENERAL PHARMACY W/ HCPCS (ALT 636 FOR OP/ED): Performed by: NURSE PRACTITIONER

## 2023-01-01 PROCEDURE — 85027 COMPLETE CBC AUTOMATED: CPT | Performed by: PHYSICIAN ASSISTANT

## 2023-01-01 PROCEDURE — 71045 X-RAY EXAM CHEST 1 VIEW: CPT

## 2023-01-01 PROCEDURE — 86901 BLOOD TYPING SEROLOGIC RH(D): CPT | Performed by: EMERGENCY MEDICINE

## 2023-01-01 PROCEDURE — 96374 THER/PROPH/DIAG INJ IV PUSH: CPT | Performed by: STUDENT IN AN ORGANIZED HEALTH CARE EDUCATION/TRAINING PROGRAM

## 2023-01-01 PROCEDURE — 99214 OFFICE O/P EST MOD 30 MIN: CPT | Mod: 25 | Performed by: NURSE PRACTITIONER

## 2023-01-01 PROCEDURE — 97161 PT EVAL LOW COMPLEX 20 MIN: CPT | Mod: GP

## 2023-01-01 PROCEDURE — 1036F TOBACCO NON-USER: CPT | Performed by: INTERNAL MEDICINE

## 2023-01-01 PROCEDURE — 99231 SBSQ HOSP IP/OBS SF/LOW 25: CPT

## 2023-01-01 PROCEDURE — 93308 TTE F-UP OR LMTD: CPT | Performed by: INTERNAL MEDICINE

## 2023-01-01 PROCEDURE — G0316 PR PROLONGED INPATIENT/OBSERVATION EM SVC EA 15 MIN: HCPCS

## 2023-01-01 PROCEDURE — 85018 HEMOGLOBIN: CPT | Performed by: PHYSICIAN ASSISTANT

## 2023-01-01 PROCEDURE — 80053 COMPREHEN METABOLIC PANEL: CPT | Performed by: INTERNAL MEDICINE

## 2023-01-01 PROCEDURE — 86850 RBC ANTIBODY SCREEN: CPT | Performed by: STUDENT IN AN ORGANIZED HEALTH CARE EDUCATION/TRAINING PROGRAM

## 2023-01-01 PROCEDURE — 99232 SBSQ HOSP IP/OBS MODERATE 35: CPT | Performed by: PHYSICIAN ASSISTANT

## 2023-01-01 PROCEDURE — 86920 COMPATIBILITY TEST SPIN: CPT

## 2023-01-01 PROCEDURE — 99213 OFFICE O/P EST LOW 20 MIN: CPT | Mod: 25 | Performed by: NURSE PRACTITIONER

## 2023-01-01 PROCEDURE — 1159F MED LIST DOCD IN RCRD: CPT | Performed by: INTERNAL MEDICINE

## 2023-01-01 PROCEDURE — 99255 IP/OBS CONSLTJ NEW/EST HI 80: CPT | Performed by: INTERNAL MEDICINE

## 2023-01-01 PROCEDURE — 1036F TOBACCO NON-USER: CPT | Performed by: STUDENT IN AN ORGANIZED HEALTH CARE EDUCATION/TRAINING PROGRAM

## 2023-01-01 PROCEDURE — 1160F RVW MEDS BY RX/DR IN RCRD: CPT | Performed by: STUDENT IN AN ORGANIZED HEALTH CARE EDUCATION/TRAINING PROGRAM

## 2023-01-01 PROCEDURE — 85730 THROMBOPLASTIN TIME PARTIAL: CPT | Performed by: STUDENT IN AN ORGANIZED HEALTH CARE EDUCATION/TRAINING PROGRAM

## 2023-01-01 PROCEDURE — 71046 X-RAY EXAM CHEST 2 VIEWS: CPT | Mod: FY

## 2023-01-01 PROCEDURE — 99215 OFFICE O/P EST HI 40 MIN: CPT | Performed by: INTERNAL MEDICINE

## 2023-01-01 PROCEDURE — 99223 1ST HOSP IP/OBS HIGH 75: CPT

## 2023-01-01 PROCEDURE — 99285 EMERGENCY DEPT VISIT HI MDM: CPT | Performed by: STUDENT IN AN ORGANIZED HEALTH CARE EDUCATION/TRAINING PROGRAM

## 2023-01-01 PROCEDURE — 83880 ASSAY OF NATRIURETIC PEPTIDE: CPT | Performed by: EMERGENCY MEDICINE

## 2023-01-01 PROCEDURE — 99215 OFFICE O/P EST HI 40 MIN: CPT | Performed by: STUDENT IN AN ORGANIZED HEALTH CARE EDUCATION/TRAINING PROGRAM

## 2023-01-01 PROCEDURE — 91110 GI TRC IMG INTRAL ESOPH-ILE: CPT

## 2023-01-01 PROCEDURE — 93000 ELECTROCARDIOGRAM COMPLETE: CPT | Performed by: INTERNAL MEDICINE

## 2023-01-01 PROCEDURE — 96374 THER/PROPH/DIAG INJ IV PUSH: CPT | Mod: INF

## 2023-01-01 PROCEDURE — 2500000001 HC RX 250 WO HCPCS SELF ADMINISTERED DRUGS (ALT 637 FOR MEDICARE OP): Performed by: NURSE PRACTITIONER

## 2023-01-01 PROCEDURE — 71046 X-RAY EXAM CHEST 2 VIEWS: CPT | Performed by: STUDENT IN AN ORGANIZED HEALTH CARE EDUCATION/TRAINING PROGRAM

## 2023-01-01 PROCEDURE — 36415 COLL VENOUS BLD VENIPUNCTURE: CPT | Performed by: STUDENT IN AN ORGANIZED HEALTH CARE EDUCATION/TRAINING PROGRAM

## 2023-01-01 PROCEDURE — 85027 COMPLETE CBC AUTOMATED: CPT | Performed by: ANESTHESIOLOGY

## 2023-01-01 PROCEDURE — 70450 CT HEAD/BRAIN W/O DYE: CPT | Performed by: RADIOLOGY

## 2023-01-01 PROCEDURE — 93356 MYOCRD STRAIN IMG SPCKL TRCK: CPT | Performed by: INTERNAL MEDICINE

## 2023-01-01 PROCEDURE — 2500000005 HC RX 250 GENERAL PHARMACY W/O HCPCS: Performed by: NURSE ANESTHETIST, CERTIFIED REGISTERED

## 2023-01-01 PROCEDURE — 36415 COLL VENOUS BLD VENIPUNCTURE: CPT | Performed by: PHYSICIAN ASSISTANT

## 2023-01-01 PROCEDURE — 97116 GAIT TRAINING THERAPY: CPT | Mod: GP,CQ

## 2023-01-01 PROCEDURE — 70450 CT HEAD/BRAIN W/O DYE: CPT | Mod: MG

## 2023-01-01 PROCEDURE — 2720000007 HC OR 272 NO HCPCS

## 2023-01-01 PROCEDURE — 83550 IRON BINDING TEST: CPT | Performed by: STUDENT IN AN ORGANIZED HEALTH CARE EDUCATION/TRAINING PROGRAM

## 2023-01-01 PROCEDURE — 84132 ASSAY OF SERUM POTASSIUM: CPT | Performed by: EMERGENCY MEDICINE

## 2023-01-01 PROCEDURE — 99223 1ST HOSP IP/OBS HIGH 75: CPT | Performed by: PHYSICIAN ASSISTANT

## 2023-01-01 PROCEDURE — 71046 X-RAY EXAM CHEST 2 VIEWS: CPT | Mod: FOREIGN READ | Performed by: RADIOLOGY

## 2023-01-01 PROCEDURE — 84484 ASSAY OF TROPONIN QUANT: CPT | Performed by: INTERNAL MEDICINE

## 2023-01-01 PROCEDURE — 83735 ASSAY OF MAGNESIUM: CPT | Performed by: STUDENT IN AN ORGANIZED HEALTH CARE EDUCATION/TRAINING PROGRAM

## 2023-01-01 PROCEDURE — 96375 TX/PRO/DX INJ NEW DRUG ADDON: CPT | Performed by: STUDENT IN AN ORGANIZED HEALTH CARE EDUCATION/TRAINING PROGRAM

## 2023-01-01 PROCEDURE — 99239 HOSP IP/OBS DSCHRG MGMT >30: CPT | Performed by: INTERNAL MEDICINE

## 2023-01-01 PROCEDURE — 84075 ASSAY ALKALINE PHOSPHATASE: CPT | Performed by: STUDENT IN AN ORGANIZED HEALTH CARE EDUCATION/TRAINING PROGRAM

## 2023-01-01 PROCEDURE — 99233 SBSQ HOSP IP/OBS HIGH 50: CPT | Performed by: PHYSICIAN ASSISTANT

## 2023-01-01 PROCEDURE — 93010 ELECTROCARDIOGRAM REPORT: CPT | Performed by: INTERNAL MEDICINE

## 2023-01-01 PROCEDURE — 7100000009 HC PHASE TWO TIME - INITIAL BASE CHARGE: Performed by: INTERNAL MEDICINE

## 2023-01-01 PROCEDURE — 81003 URINALYSIS AUTO W/O SCOPE: CPT | Performed by: EMERGENCY MEDICINE

## 2023-01-01 PROCEDURE — 3077F SYST BP >= 140 MM HG: CPT | Performed by: NURSE PRACTITIONER

## 2023-01-01 PROCEDURE — G1004 CDSM NDSC: HCPCS

## 2023-01-01 PROCEDURE — 87636 SARSCOV2 & INF A&B AMP PRB: CPT | Performed by: INTERNAL MEDICINE

## 2023-01-01 PROCEDURE — 7100000010 HC PHASE TWO TIME - EACH INCREMENTAL 1 MINUTE: Performed by: INTERNAL MEDICINE

## 2023-01-01 PROCEDURE — 99214 OFFICE O/P EST MOD 30 MIN: CPT | Performed by: INTERNAL MEDICINE

## 2023-01-01 PROCEDURE — 87635 SARS-COV-2 COVID-19 AMP PRB: CPT | Performed by: EMERGENCY MEDICINE

## 2023-01-01 PROCEDURE — 99284 EMERGENCY DEPT VISIT MOD MDM: CPT | Performed by: STUDENT IN AN ORGANIZED HEALTH CARE EDUCATION/TRAINING PROGRAM

## 2023-01-01 PROCEDURE — 99285 EMERGENCY DEPT VISIT HI MDM: CPT | Performed by: EMERGENCY MEDICINE

## 2023-01-01 PROCEDURE — 1126F AMNT PAIN NOTED NONE PRSNT: CPT | Performed by: STUDENT IN AN ORGANIZED HEALTH CARE EDUCATION/TRAINING PROGRAM

## 2023-01-01 PROCEDURE — 7100000002 HC RECOVERY ROOM TIME - EACH INCREMENTAL 1 MINUTE: Performed by: INTERNAL MEDICINE

## 2023-01-01 PROCEDURE — G0390 TRAUMA RESPONS W/HOSP CRITI: HCPCS | Performed by: STUDENT IN AN ORGANIZED HEALTH CARE EDUCATION/TRAINING PROGRAM

## 2023-01-01 RX ORDER — FUROSEMIDE 10 MG/ML
40 INJECTION INTRAMUSCULAR; INTRAVENOUS ONCE
Status: COMPLETED | OUTPATIENT
Start: 2023-01-01 | End: 2023-01-01

## 2023-01-01 RX ORDER — POLYETHYLENE GLYCOL 3350 17 G/17G
17 POWDER, FOR SOLUTION ORAL DAILY
Status: DISCONTINUED | OUTPATIENT
Start: 2023-01-01 | End: 2023-01-01 | Stop reason: HOSPADM

## 2023-01-01 RX ORDER — PROPOFOL 10 MG/ML
INJECTION, EMULSION INTRAVENOUS AS NEEDED
Status: DISCONTINUED | OUTPATIENT
Start: 2023-01-01 | End: 2023-01-01

## 2023-01-01 RX ORDER — FUROSEMIDE 10 MG/ML
80 INJECTION INTRAMUSCULAR; INTRAVENOUS ONCE
Status: COMPLETED | OUTPATIENT
Start: 2023-01-01 | End: 2023-01-01

## 2023-01-01 RX ORDER — PANTOPRAZOLE SODIUM 40 MG/10ML
40 INJECTION, POWDER, LYOPHILIZED, FOR SOLUTION INTRAVENOUS
Status: DISCONTINUED | OUTPATIENT
Start: 2023-01-01 | End: 2023-01-01

## 2023-01-01 RX ORDER — LEVOTHYROXINE SODIUM 25 UG/1
25 TABLET ORAL DAILY
Status: DISCONTINUED | OUTPATIENT
Start: 2023-01-01 | End: 2023-01-01 | Stop reason: HOSPADM

## 2023-01-01 RX ORDER — ACETAMINOPHEN 325 MG/1
650 TABLET ORAL EVERY 6 HOURS PRN
Status: DISCONTINUED | OUTPATIENT
Start: 2023-01-01 | End: 2023-01-01 | Stop reason: HOSPADM

## 2023-01-01 RX ORDER — PANTOPRAZOLE SODIUM 40 MG/1
40 TABLET, DELAYED RELEASE ORAL
Qty: 60 TABLET | Refills: 1 | Status: SHIPPED | OUTPATIENT
Start: 2023-01-01 | End: 2023-01-01 | Stop reason: ALTCHOICE

## 2023-01-01 RX ORDER — NAPROXEN SODIUM 220 MG/1
81 TABLET, FILM COATED ORAL DAILY
Status: DISCONTINUED | OUTPATIENT
Start: 2023-01-01 | End: 2023-01-01 | Stop reason: HOSPADM

## 2023-01-01 RX ORDER — AMIODARONE HYDROCHLORIDE 200 MG/1
200 TABLET ORAL DAILY
Status: DISCONTINUED | OUTPATIENT
Start: 2023-01-01 | End: 2023-01-01 | Stop reason: HOSPADM

## 2023-01-01 RX ORDER — PANTOPRAZOLE SODIUM 40 MG/1
40 TABLET, DELAYED RELEASE ORAL
Status: DISCONTINUED | OUTPATIENT
Start: 2023-01-01 | End: 2023-01-01

## 2023-01-01 RX ORDER — METOLAZONE 5 MG/1
5 TABLET ORAL DAILY
Status: DISCONTINUED | OUTPATIENT
Start: 2023-01-01 | End: 2023-01-01 | Stop reason: HOSPADM

## 2023-01-01 RX ORDER — AMIODARONE HYDROCHLORIDE 200 MG/1
200 TABLET ORAL DAILY
COMMUNITY

## 2023-01-01 RX ORDER — FUROSEMIDE 80 MG/1
80 TABLET ORAL DAILY
Status: DISCONTINUED | OUTPATIENT
Start: 2023-01-01 | End: 2023-01-01 | Stop reason: HOSPADM

## 2023-01-01 RX ORDER — METOLAZONE 5 MG/1
2.5 TABLET ORAL ONCE
Status: CANCELLED
Start: 2023-01-01 | End: 2023-01-01

## 2023-01-01 RX ORDER — PROMETHAZINE HYDROCHLORIDE 25 MG/1
25 TABLET ORAL EVERY 6 HOURS PRN
Status: DISCONTINUED | OUTPATIENT
Start: 2023-01-01 | End: 2023-01-01 | Stop reason: HOSPADM

## 2023-01-01 RX ORDER — LEVOTHYROXINE SODIUM 25 UG/1
25 TABLET ORAL
Status: DISCONTINUED | OUTPATIENT
Start: 2023-01-01 | End: 2023-01-01 | Stop reason: HOSPADM

## 2023-01-01 RX ORDER — ALPRAZOLAM 0.25 MG/1
TABLET ORAL EVERY 8 HOURS PRN
Status: ON HOLD | COMMUNITY
End: 2023-01-01 | Stop reason: ENTERED-IN-ERROR

## 2023-01-01 RX ORDER — MECLIZINE HCL 12.5 MG 12.5 MG/1
TABLET ORAL
COMMUNITY
Start: 2022-07-14 | End: 2023-01-01 | Stop reason: HOSPADM

## 2023-01-01 RX ORDER — TAMSULOSIN HYDROCHLORIDE 0.4 MG/1
0.4 CAPSULE ORAL DAILY
Status: DISCONTINUED | OUTPATIENT
Start: 2023-01-01 | End: 2023-01-01 | Stop reason: HOSPADM

## 2023-01-01 RX ORDER — TAMSULOSIN HYDROCHLORIDE 0.4 MG/1
0.4 CAPSULE ORAL DAILY
COMMUNITY

## 2023-01-01 RX ORDER — FAMOTIDINE 10 MG/ML
20 INJECTION INTRAVENOUS ONCE
Status: COMPLETED | OUTPATIENT
Start: 2023-01-01 | End: 2023-01-01

## 2023-01-01 RX ORDER — NAPROXEN SODIUM 220 MG/1
81 TABLET, FILM COATED ORAL ONCE
Status: COMPLETED | OUTPATIENT
Start: 2023-01-01 | End: 2023-01-01

## 2023-01-01 RX ORDER — METOLAZONE 2.5 MG/1
5 TABLET ORAL ONCE
Status: COMPLETED | OUTPATIENT
Start: 2023-01-01 | End: 2023-01-01

## 2023-01-01 RX ORDER — POTASSIUM CHLORIDE 14.9 MG/ML
20 INJECTION INTRAVENOUS
Status: DISPENSED | OUTPATIENT
Start: 2023-01-01 | End: 2023-01-01

## 2023-01-01 RX ORDER — TRAMADOL HYDROCHLORIDE 50 MG/1
50 TABLET ORAL DAILY PRN
COMMUNITY

## 2023-01-01 RX ORDER — METOLAZONE 5 MG/1
5 TABLET ORAL ONCE
Status: CANCELLED
Start: 2023-01-01 | End: 2023-01-01

## 2023-01-01 RX ORDER — SACUBITRIL AND VALSARTAN 24; 26 MG/1; MG/1
1 TABLET, FILM COATED ORAL 2 TIMES DAILY
COMMUNITY
Start: 2022-08-05 | End: 2023-01-01 | Stop reason: HOSPADM

## 2023-01-01 RX ORDER — POTASSIUM CHLORIDE 14.9 MG/ML
20 INJECTION INTRAVENOUS
Status: COMPLETED | OUTPATIENT
Start: 2023-01-01 | End: 2023-01-01

## 2023-01-01 RX ORDER — NAPROXEN SODIUM 220 MG/1
81 TABLET, FILM COATED ORAL DAILY
Qty: 30 TABLET | Refills: 0 | COMMUNITY
Start: 2023-01-01 | End: 2023-01-01 | Stop reason: HOSPADM

## 2023-01-01 RX ORDER — GUAIFENESIN 600 MG/1
600 TABLET, EXTENDED RELEASE ORAL 2 TIMES DAILY
Status: DISCONTINUED | OUTPATIENT
Start: 2023-01-01 | End: 2023-01-01 | Stop reason: HOSPADM

## 2023-01-01 RX ORDER — FUROSEMIDE 10 MG/ML
80 INJECTION INTRAMUSCULAR; INTRAVENOUS ONCE
Status: CANCELLED
Start: 2023-01-01 | End: 2023-01-01

## 2023-01-01 RX ORDER — PANTOPRAZOLE SODIUM 40 MG/1
40 TABLET, DELAYED RELEASE ORAL
Status: DISCONTINUED | OUTPATIENT
Start: 2023-01-01 | End: 2023-01-01 | Stop reason: HOSPADM

## 2023-01-01 RX ORDER — FUROSEMIDE 10 MG/ML
100 INJECTION INTRAMUSCULAR; INTRAVENOUS 2 TIMES DAILY
Status: DISCONTINUED | OUTPATIENT
Start: 2023-01-01 | End: 2023-01-01

## 2023-01-01 RX ORDER — ZOLPIDEM TARTRATE 5 MG/1
TABLET ORAL
Status: ON HOLD | COMMUNITY
End: 2023-01-01 | Stop reason: ENTERED-IN-ERROR

## 2023-01-01 RX ORDER — FLUTICASONE PROPIONATE 50 MCG
1 SPRAY, SUSPENSION (ML) NASAL DAILY
Status: DISCONTINUED | OUTPATIENT
Start: 2023-01-01 | End: 2023-01-01 | Stop reason: HOSPADM

## 2023-01-01 RX ORDER — METOLAZONE 2.5 MG/1
2.5 TABLET ORAL ONCE
Status: CANCELLED
Start: 2023-01-01 | End: 2023-01-01

## 2023-01-01 RX ORDER — DIPHENOXYLATE HYDROCHLORIDE AND ATROPINE SULFATE 2.5; .025 MG/1; MG/1
2 TABLET ORAL 4 TIMES DAILY PRN
COMMUNITY

## 2023-01-01 RX ORDER — SODIUM CHLORIDE 9 MG/ML
50 INJECTION, SOLUTION INTRAVENOUS CONTINUOUS
Status: DISCONTINUED | OUTPATIENT
Start: 2023-01-01 | End: 2023-01-01 | Stop reason: HOSPADM

## 2023-01-01 RX ORDER — FUROSEMIDE 80 MG/1
80 TABLET ORAL DAILY
Start: 2023-01-01 | End: 2023-01-01 | Stop reason: ALTCHOICE

## 2023-01-01 RX ORDER — ACETAMINOPHEN 325 MG/1
650 TABLET ORAL EVERY 4 HOURS PRN
Status: DISCONTINUED | OUTPATIENT
Start: 2023-01-01 | End: 2023-01-01 | Stop reason: HOSPADM

## 2023-01-01 RX ORDER — MIRTAZAPINE 30 MG/1
TABLET, FILM COATED ORAL
COMMUNITY
End: 2023-01-01 | Stop reason: HOSPADM

## 2023-01-01 RX ORDER — POTASSIUM CHLORIDE 750 MG/1
40 TABLET, FILM COATED, EXTENDED RELEASE ORAL ONCE
Status: COMPLETED | OUTPATIENT
Start: 2023-01-01 | End: 2023-01-01

## 2023-01-01 RX ORDER — POTASSIUM CHLORIDE 750 MG/1
10 TABLET, EXTENDED RELEASE ORAL DAILY
COMMUNITY
Start: 2023-01-01 | End: 2023-01-01 | Stop reason: ALTCHOICE

## 2023-01-01 RX ORDER — LORAZEPAM 0.5 MG/1
0.5 TABLET ORAL EVERY 8 HOURS PRN
Status: DISCONTINUED | OUTPATIENT
Start: 2023-01-01 | End: 2023-01-01 | Stop reason: HOSPADM

## 2023-01-01 RX ORDER — ROSUVASTATIN CALCIUM 10 MG/1
5 TABLET, COATED ORAL DAILY
Status: DISCONTINUED | OUTPATIENT
Start: 2023-01-01 | End: 2023-01-01 | Stop reason: HOSPADM

## 2023-01-01 RX ORDER — METOLAZONE 2.5 MG/1
5 TABLET ORAL ONCE
Status: CANCELLED
Start: 2023-01-01 | End: 2023-01-01

## 2023-01-01 RX ORDER — ACETAMINOPHEN 650 MG/1
650 SUPPOSITORY RECTAL EVERY 4 HOURS PRN
Status: DISCONTINUED | OUTPATIENT
Start: 2023-01-01 | End: 2023-01-01 | Stop reason: HOSPADM

## 2023-01-01 RX ORDER — PANTOPRAZOLE SODIUM 40 MG/10ML
40 INJECTION, POWDER, LYOPHILIZED, FOR SOLUTION INTRAVENOUS
Status: DISCONTINUED | OUTPATIENT
Start: 2023-01-01 | End: 2023-01-01 | Stop reason: SDUPTHER

## 2023-01-01 RX ORDER — ONDANSETRON HYDROCHLORIDE 2 MG/ML
4 INJECTION, SOLUTION INTRAVENOUS EVERY 6 HOURS PRN
Status: DISCONTINUED | OUTPATIENT
Start: 2023-01-01 | End: 2023-01-01 | Stop reason: HOSPADM

## 2023-01-01 RX ORDER — CELECOXIB 200 MG/1
CAPSULE ORAL
COMMUNITY
Start: 2020-05-29 | End: 2023-01-01 | Stop reason: HOSPADM

## 2023-01-01 RX ORDER — BISACODYL 5 MG
10 TABLET, DELAYED RELEASE (ENTERIC COATED) ORAL DAILY PRN
Status: DISCONTINUED | OUTPATIENT
Start: 2023-01-01 | End: 2023-01-01 | Stop reason: HOSPADM

## 2023-01-01 RX ORDER — CHLOROTHIAZIDE SODIUM 500 MG/1
250 INJECTION INTRAVENOUS ONCE
Status: CANCELLED
Start: 2023-01-01 | End: 2023-01-01

## 2023-01-01 RX ORDER — TRAMADOL HYDROCHLORIDE 50 MG/1
50 TABLET ORAL 2 TIMES DAILY
Status: DISCONTINUED | OUTPATIENT
Start: 2023-01-01 | End: 2023-01-01 | Stop reason: HOSPADM

## 2023-01-01 RX ORDER — PANTOPRAZOLE SODIUM 40 MG/1
40 TABLET, DELAYED RELEASE ORAL 2 TIMES DAILY
Status: DISCONTINUED | OUTPATIENT
Start: 2023-01-01 | End: 2023-01-01 | Stop reason: HOSPADM

## 2023-01-01 RX ORDER — CHLOROTHIAZIDE SODIUM 500 MG/1
250 INJECTION INTRAVENOUS ONCE
Status: COMPLETED | OUTPATIENT
Start: 2023-01-01 | End: 2023-01-01

## 2023-01-01 RX ORDER — HYDROCODONE BITARTRATE AND ACETAMINOPHEN 5; 325 MG/1; MG/1
1 TABLET ORAL EVERY 6 HOURS PRN
Status: DISCONTINUED | OUTPATIENT
Start: 2023-01-01 | End: 2023-01-01 | Stop reason: HOSPADM

## 2023-01-01 RX ORDER — PANTOPRAZOLE SODIUM 40 MG/10ML
40 INJECTION, POWDER, LYOPHILIZED, FOR SOLUTION INTRAVENOUS DAILY
Status: DISCONTINUED | OUTPATIENT
Start: 2023-01-01 | End: 2023-01-01 | Stop reason: HOSPADM

## 2023-01-01 RX ORDER — EPLERENONE 25 MG/1
25 TABLET, FILM COATED ORAL DAILY
Status: DISCONTINUED | OUTPATIENT
Start: 2023-01-01 | End: 2023-01-01 | Stop reason: HOSPADM

## 2023-01-01 RX ORDER — ASPIRIN 81 MG/1
81 TABLET ORAL DAILY
COMMUNITY

## 2023-01-01 RX ORDER — LOSARTAN POTASSIUM 25 MG/1
12.5 TABLET ORAL DAILY
COMMUNITY
End: 2023-01-01 | Stop reason: HOSPADM

## 2023-01-01 RX ORDER — FAMOTIDINE 10 MG/ML
20 INJECTION INTRAVENOUS ONCE
Status: CANCELLED | OUTPATIENT
Start: 2023-01-01 | End: 2023-01-01

## 2023-01-01 RX ORDER — EPLERENONE 50 MG/1
50 TABLET, FILM COATED ORAL DAILY
Status: DISCONTINUED | OUTPATIENT
Start: 2023-01-01 | End: 2023-01-01 | Stop reason: HOSPADM

## 2023-01-01 RX ORDER — MOLNUPIRAVIR 200 MG/1
CAPSULE ORAL
COMMUNITY
Start: 2022-08-28 | End: 2023-01-01 | Stop reason: HOSPADM

## 2023-01-01 RX ORDER — METOLAZONE 2.5 MG/1
2.5 TABLET ORAL ONCE
Status: COMPLETED | OUTPATIENT
Start: 2023-01-01 | End: 2023-01-01

## 2023-01-01 RX ORDER — SODIUM CHLORIDE 9 MG/ML
50 INJECTION, SOLUTION INTRAVENOUS CONTINUOUS
Status: DISCONTINUED | OUTPATIENT
Start: 2023-01-01 | End: 2023-01-01

## 2023-01-01 RX ORDER — BUMETANIDE 2 MG/1
2 TABLET ORAL DAILY
COMMUNITY
End: 2023-01-01 | Stop reason: HOSPADM

## 2023-01-01 RX ORDER — MOLNUPIRAVIR 200 MG/1
1 CAPSULE ORAL EVERY 12 HOURS
COMMUNITY

## 2023-01-01 RX ORDER — POTASSIUM CHLORIDE 20 MEQ/1
40 TABLET, EXTENDED RELEASE ORAL ONCE
Status: COMPLETED | OUTPATIENT
Start: 2023-01-01 | End: 2023-01-01

## 2023-01-01 RX ORDER — TAMSULOSIN HYDROCHLORIDE 0.4 MG/1
CAPSULE ORAL
Qty: 90 CAPSULE | Refills: 1 | Status: CANCELLED | OUTPATIENT
Start: 2023-01-01

## 2023-01-01 RX ORDER — FENTANYL CITRATE 50 UG/ML
INJECTION, SOLUTION INTRAMUSCULAR; INTRAVENOUS AS NEEDED
Status: DISCONTINUED | OUTPATIENT
Start: 2023-01-01 | End: 2023-01-01

## 2023-01-01 RX ORDER — PANTOPRAZOLE SODIUM 40 MG/1
40 TABLET, DELAYED RELEASE ORAL
Status: DISCONTINUED | OUTPATIENT
Start: 2023-01-01 | End: 2023-01-01 | Stop reason: SDUPTHER

## 2023-01-01 RX ORDER — LEVOTHYROXINE SODIUM 25 UG/1
25 TABLET ORAL DAILY
COMMUNITY

## 2023-01-01 RX ORDER — METOLAZONE 2.5 MG/1
5 TABLET ORAL ONCE
Status: DISCONTINUED | OUTPATIENT
Start: 2023-01-01 | End: 2023-01-01 | Stop reason: HOSPADM

## 2023-01-01 RX ORDER — FUROSEMIDE 80 MG/1
80 TABLET ORAL DAILY
Qty: 30 TABLET | Refills: 0 | Status: ON HOLD | OUTPATIENT
Start: 2023-01-01 | End: 2023-01-01 | Stop reason: SDUPTHER

## 2023-01-01 RX ORDER — CARVEDILOL 3.12 MG/1
1 TABLET ORAL
COMMUNITY
End: 2023-01-01 | Stop reason: HOSPADM

## 2023-01-01 RX ORDER — LOPERAMIDE HYDROCHLORIDE 2 MG/1
CAPSULE ORAL
COMMUNITY
End: 2023-01-01 | Stop reason: HOSPADM

## 2023-01-01 RX ORDER — ACETAMINOPHEN 160 MG/5ML
650 SOLUTION ORAL EVERY 4 HOURS PRN
Status: DISCONTINUED | OUTPATIENT
Start: 2023-01-01 | End: 2023-01-01 | Stop reason: HOSPADM

## 2023-01-01 RX ORDER — SUCRALFATE 1 G/1
1 TABLET ORAL 4 TIMES DAILY
Status: DISCONTINUED | OUTPATIENT
Start: 2023-01-01 | End: 2023-01-01 | Stop reason: HOSPADM

## 2023-01-01 RX ORDER — POTASSIUM CHLORIDE 20 MEQ/1
20 TABLET, EXTENDED RELEASE ORAL DAILY
COMMUNITY
End: 2023-01-01 | Stop reason: ENTERED-IN-ERROR

## 2023-01-01 RX ORDER — PANTOPRAZOLE SODIUM 40 MG/10ML
40 INJECTION, POWDER, LYOPHILIZED, FOR SOLUTION INTRAVENOUS
Status: DISCONTINUED | OUTPATIENT
Start: 2023-01-01 | End: 2023-01-01 | Stop reason: HOSPADM

## 2023-01-01 RX ORDER — EPLERENONE 25 MG/1
50 TABLET, FILM COATED ORAL DAILY
COMMUNITY

## 2023-01-01 RX ORDER — SUCRALFATE 1 G/10ML
1 SUSPENSION ORAL
Status: DISCONTINUED | OUTPATIENT
Start: 2023-01-01 | End: 2023-01-01 | Stop reason: HOSPADM

## 2023-01-01 RX ORDER — GUAIFENESIN 600 MG/1
600 TABLET, EXTENDED RELEASE ORAL EVERY 12 HOURS PRN
Status: DISCONTINUED | OUTPATIENT
Start: 2023-01-01 | End: 2023-01-01 | Stop reason: HOSPADM

## 2023-01-01 RX ORDER — TALC
3 POWDER (GRAM) TOPICAL DAILY
Status: DISCONTINUED | OUTPATIENT
Start: 2023-01-01 | End: 2023-01-01 | Stop reason: HOSPADM

## 2023-01-01 RX ORDER — LIDOCAINE HYDROCHLORIDE 20 MG/ML
INJECTION, SOLUTION INFILTRATION; PERINEURAL AS NEEDED
Status: DISCONTINUED | OUTPATIENT
Start: 2023-01-01 | End: 2023-01-01

## 2023-01-01 RX ORDER — ONDANSETRON HYDROCHLORIDE 2 MG/ML
4 INJECTION, SOLUTION INTRAVENOUS EVERY 8 HOURS PRN
Status: DISCONTINUED | OUTPATIENT
Start: 2023-01-01 | End: 2023-01-01 | Stop reason: HOSPADM

## 2023-01-01 RX ORDER — LIDOCAINE HCL/PF 100 MG/5ML
SYRINGE (ML) INTRAVENOUS AS NEEDED
Status: DISCONTINUED | OUTPATIENT
Start: 2023-01-01 | End: 2023-01-01

## 2023-01-01 RX ORDER — SODIUM CHLORIDE 0.9 % (FLUSH) 0.9 %
SYRINGE (ML) INJECTION
Status: COMPLETED
Start: 2023-01-01 | End: 2023-01-01

## 2023-01-01 RX ORDER — TORSEMIDE 100 MG/1
50 TABLET ORAL 2 TIMES DAILY
Qty: 30 TABLET | Refills: 1 | Status: SHIPPED | OUTPATIENT
Start: 2023-01-01 | End: 2023-12-24

## 2023-01-01 RX ORDER — CHOLESTYRAMINE 4 G/9G
POWDER, FOR SUSPENSION ORAL
COMMUNITY
Start: 2022-08-22 | End: 2023-01-01 | Stop reason: HOSPADM

## 2023-01-01 RX ORDER — SUCRALFATE 1 G/10ML
1 SUSPENSION ORAL
Qty: 550 ML | Refills: 0 | Status: SHIPPED | OUTPATIENT
Start: 2023-01-01 | End: 2023-01-01

## 2023-01-01 RX ORDER — POTASSIUM CHLORIDE 20 MEQ/1
40 TABLET, EXTENDED RELEASE ORAL ONCE
Status: DISCONTINUED | OUTPATIENT
Start: 2023-01-01 | End: 2023-01-01

## 2023-01-01 RX ORDER — LORAZEPAM 0.5 MG/1
0.5 TABLET ORAL ONCE
Status: COMPLETED | OUTPATIENT
Start: 2023-01-01 | End: 2023-01-01

## 2023-01-01 RX ORDER — TALC
3 POWDER (GRAM) TOPICAL NIGHTLY PRN
Status: DISCONTINUED | OUTPATIENT
Start: 2023-01-01 | End: 2023-01-01 | Stop reason: HOSPADM

## 2023-01-01 RX ORDER — FUROSEMIDE 10 MG/ML
40 INJECTION INTRAMUSCULAR; INTRAVENOUS EVERY 12 HOURS
Status: DISCONTINUED | OUTPATIENT
Start: 2023-01-01 | End: 2023-01-01 | Stop reason: HOSPADM

## 2023-01-01 RX ORDER — OMEPRAZOLE 40 MG/1
CAPSULE, DELAYED RELEASE ORAL
COMMUNITY
Start: 2018-02-23 | End: 2023-01-01 | Stop reason: HOSPADM

## 2023-01-01 RX ORDER — SERTRALINE HYDROCHLORIDE 50 MG/1
1 TABLET, FILM COATED ORAL DAILY
COMMUNITY
End: 2023-01-01 | Stop reason: HOSPADM

## 2023-01-01 RX ORDER — PANTOPRAZOLE SODIUM 40 MG/1
40 TABLET, DELAYED RELEASE ORAL DAILY
Qty: 30 TABLET | Refills: 0 | Status: SHIPPED | OUTPATIENT
Start: 2023-01-01 | End: 2023-01-01

## 2023-01-01 RX ORDER — SERTRALINE HYDROCHLORIDE 50 MG/1
50 TABLET, FILM COATED ORAL DAILY
Status: DISCONTINUED | OUTPATIENT
Start: 2023-01-01 | End: 2023-01-01

## 2023-01-01 RX ORDER — ONDANSETRON 4 MG/1
4 TABLET, ORALLY DISINTEGRATING ORAL EVERY 8 HOURS PRN
Status: DISCONTINUED | OUTPATIENT
Start: 2023-01-01 | End: 2023-01-01 | Stop reason: HOSPADM

## 2023-01-01 RX ORDER — PROMETHAZINE HYDROCHLORIDE 25 MG/1
25 SUPPOSITORY RECTAL EVERY 12 HOURS PRN
Status: DISCONTINUED | OUTPATIENT
Start: 2023-01-01 | End: 2023-01-01 | Stop reason: HOSPADM

## 2023-01-01 RX ORDER — CEPHALEXIN 500 MG/1
CAPSULE ORAL
COMMUNITY
Start: 2023-01-01 | End: 2023-01-01 | Stop reason: HOSPADM

## 2023-01-01 RX ORDER — FUROSEMIDE 10 MG/ML
20 INJECTION INTRAMUSCULAR; INTRAVENOUS ONCE
Status: DISCONTINUED | OUTPATIENT
Start: 2023-01-01 | End: 2023-01-01

## 2023-01-01 RX ORDER — FUROSEMIDE 10 MG/ML
80 INJECTION INTRAMUSCULAR; INTRAVENOUS ONCE
Status: CANCELLED
Start: 2023-01-01

## 2023-01-01 RX ORDER — METOLAZONE 5 MG/1
TABLET ORAL
COMMUNITY
Start: 2023-01-01 | End: 2023-01-01 | Stop reason: HOSPADM

## 2023-01-01 RX ORDER — TORSEMIDE 20 MG/1
100 TABLET ORAL
Status: DISCONTINUED | OUTPATIENT
Start: 2023-01-01 | End: 2023-01-01 | Stop reason: HOSPADM

## 2023-01-01 RX ORDER — ALPRAZOLAM 0.25 MG/1
0.25 TABLET ORAL EVERY 8 HOURS PRN
Status: DISCONTINUED | OUTPATIENT
Start: 2023-01-01 | End: 2023-01-01 | Stop reason: HOSPADM

## 2023-01-01 RX ORDER — APIXABAN 2.5 MG/1
TABLET, FILM COATED ORAL
COMMUNITY
Start: 2023-01-01 | End: 2023-01-01 | Stop reason: HOSPADM

## 2023-01-01 RX ORDER — SODIUM CHLORIDE 9 MG/ML
50 INJECTION, SOLUTION INTRAVENOUS CONTINUOUS
Status: CANCELLED | OUTPATIENT
Start: 2023-01-01

## 2023-01-01 RX ORDER — TRAMADOL HYDROCHLORIDE 50 MG/1
50 TABLET ORAL DAILY PRN
Status: DISCONTINUED | OUTPATIENT
Start: 2023-01-01 | End: 2023-01-01 | Stop reason: HOSPADM

## 2023-01-01 RX ORDER — FUROSEMIDE 10 MG/ML
40 INJECTION INTRAMUSCULAR; INTRAVENOUS 2 TIMES DAILY
Status: DISCONTINUED | OUTPATIENT
Start: 2023-01-01 | End: 2023-01-01

## 2023-01-01 RX ORDER — POTASSIUM CHLORIDE 1.5 G/1.58G
20 POWDER, FOR SOLUTION ORAL 3 TIMES DAILY
Status: COMPLETED | OUTPATIENT
Start: 2023-01-01 | End: 2023-01-01

## 2023-01-01 RX ORDER — NALOXONE HYDROCHLORIDE 4 MG/.1ML
SPRAY NASAL
Status: ON HOLD | COMMUNITY
Start: 2021-09-08 | End: 2023-01-01 | Stop reason: ENTERED-IN-ERROR

## 2023-01-01 RX ORDER — FUROSEMIDE INJECTION 80 MG/ 10 ML 8 MG/ML
80 INJECTION SUBCUTANEOUS DAILY
Qty: 12 KIT | Refills: 0 | Status: SHIPPED | OUTPATIENT
Start: 2023-01-01 | End: 2023-12-24

## 2023-01-01 RX ORDER — SUCRALFATE 1 G/1
1 TABLET ORAL 4 TIMES DAILY
Qty: 56 TABLET | Refills: 0 | Status: SHIPPED | OUTPATIENT
Start: 2023-01-01 | End: 2023-01-01

## 2023-01-01 RX ORDER — TORSEMIDE 100 MG/1
100 TABLET ORAL DAILY
Qty: 30 TABLET | Refills: 11 | Status: SHIPPED | OUTPATIENT
Start: 2023-01-01 | End: 2023-01-01 | Stop reason: SDUPTHER

## 2023-01-01 RX ORDER — ACETAMINOPHEN 325 MG/1
TABLET ORAL EVERY 6 HOURS PRN
Status: ON HOLD | COMMUNITY
Start: 2022-10-26 | End: 2023-01-01 | Stop reason: ENTERED-IN-ERROR

## 2023-01-01 RX ORDER — ROSUVASTATIN CALCIUM 10 MG/1
5 TABLET, COATED ORAL DAILY
Status: DISCONTINUED | OUTPATIENT
Start: 2023-01-01 | End: 2023-01-01

## 2023-01-01 RX ORDER — LANOLIN ALCOHOL/MO/W.PET/CERES
400 CREAM (GRAM) TOPICAL NIGHTLY
Status: DISCONTINUED | OUTPATIENT
Start: 2023-01-01 | End: 2023-01-01 | Stop reason: HOSPADM

## 2023-01-01 RX ORDER — CLOPIDOGREL BISULFATE 75 MG/1
1 TABLET ORAL DAILY
COMMUNITY
End: 2023-01-01 | Stop reason: HOSPADM

## 2023-01-01 RX ORDER — LANOLIN ALCOHOL/MO/W.PET/CERES
1 CREAM (GRAM) TOPICAL NIGHTLY
COMMUNITY
Start: 2022-05-29 | End: 2023-01-01 | Stop reason: HOSPADM

## 2023-01-01 RX ORDER — POTASSIUM CHLORIDE 1.5 G/1.58G
40 POWDER, FOR SOLUTION ORAL ONCE
Status: COMPLETED | OUTPATIENT
Start: 2023-01-01 | End: 2023-01-01

## 2023-01-01 RX ORDER — MIDODRINE HYDROCHLORIDE 2.5 MG/1
5 TABLET ORAL ONCE
Status: COMPLETED | OUTPATIENT
Start: 2023-01-01 | End: 2023-01-01

## 2023-01-01 RX ORDER — FUROSEMIDE 10 MG/ML
80 INJECTION INTRAMUSCULAR; INTRAVENOUS EVERY 12 HOURS
Status: DISCONTINUED | OUTPATIENT
Start: 2023-01-01 | End: 2023-01-01 | Stop reason: HOSPADM

## 2023-01-01 RX ORDER — ALPRAZOLAM 0.25 MG/1
0.25 TABLET ORAL 3 TIMES DAILY PRN
Status: DISCONTINUED | OUTPATIENT
Start: 2023-01-01 | End: 2023-01-01

## 2023-01-01 RX ORDER — ROSUVASTATIN CALCIUM 5 MG/1
5 TABLET, COATED ORAL DAILY
Qty: 30 TABLET | Refills: 11 | Status: SHIPPED | OUTPATIENT
Start: 2023-01-01 | End: 2023-12-24

## 2023-01-01 RX ORDER — LORAZEPAM 2 MG/ML
0.5 INJECTION INTRAMUSCULAR ONCE
Status: COMPLETED | OUTPATIENT
Start: 2023-01-01 | End: 2023-01-01

## 2023-01-01 RX ORDER — FUROSEMIDE 20 MG/1
TABLET ORAL
COMMUNITY
Start: 2022-05-31 | End: 2023-01-01 | Stop reason: HOSPADM

## 2023-01-01 RX ORDER — TAMSULOSIN HYDROCHLORIDE 0.4 MG/1
0.4 CAPSULE ORAL DAILY
Status: DISCONTINUED | OUTPATIENT
Start: 2023-01-01 | End: 2023-01-01

## 2023-01-01 RX ORDER — POTASSIUM CHLORIDE 750 MG/1
40 TABLET, FILM COATED, EXTENDED RELEASE ORAL EVERY 6 HOURS
Status: COMPLETED | OUTPATIENT
Start: 2023-01-01 | End: 2023-01-01

## 2023-01-01 RX ORDER — ZOLPIDEM TARTRATE 5 MG/1
5 TABLET ORAL NIGHTLY PRN
Status: DISCONTINUED | OUTPATIENT
Start: 2023-01-01 | End: 2023-01-01 | Stop reason: HOSPADM

## 2023-01-01 RX ORDER — DIPHENOXYLATE HYDROCHLORIDE AND ATROPINE SULFATE 2.5; .025 MG/1; MG/1
TABLET ORAL
COMMUNITY
End: 2023-01-01 | Stop reason: HOSPADM

## 2023-01-01 RX ORDER — FUROSEMIDE 10 MG/ML
80 INJECTION INTRAMUSCULAR; INTRAVENOUS ONCE
Status: DISCONTINUED | OUTPATIENT
Start: 2023-01-01 | End: 2023-01-01 | Stop reason: HOSPADM

## 2023-01-01 RX ORDER — APIXABAN 5 MG/1
5 TABLET, FILM COATED ORAL 2 TIMES DAILY
COMMUNITY
End: 2023-01-01 | Stop reason: HOSPADM

## 2023-01-01 RX ADMIN — AMIODARONE HYDROCHLORIDE 200 MG: 200 TABLET ORAL at 08:47

## 2023-01-01 RX ADMIN — SUCRALFATE 1 G: 1 TABLET ORAL at 16:11

## 2023-01-01 RX ADMIN — TRAMADOL HYDROCHLORIDE 50 MG: 50 TABLET, COATED ORAL at 21:24

## 2023-01-01 RX ADMIN — ROSUVASTATIN 5 MG: 10 TABLET, FILM COATED ORAL at 17:17

## 2023-01-01 RX ADMIN — AMIODARONE HYDROCHLORIDE 200 MG: 200 TABLET ORAL at 08:16

## 2023-01-01 RX ADMIN — SUCRALFATE 1 G: 1 TABLET ORAL at 22:02

## 2023-01-01 RX ADMIN — GUAIFENESIN 600 MG: 600 TABLET, EXTENDED RELEASE ORAL at 22:34

## 2023-01-01 RX ADMIN — Medication 3 MG: at 18:35

## 2023-01-01 RX ADMIN — FUROSEMIDE 80 MG: 10 INJECTION, SOLUTION INTRAMUSCULAR; INTRAVENOUS at 09:58

## 2023-01-01 RX ADMIN — TRAMADOL HYDROCHLORIDE 50 MG: 50 TABLET, COATED ORAL at 20:08

## 2023-01-01 RX ADMIN — METOLAZONE 5 MG: 5 TABLET ORAL at 09:20

## 2023-01-01 RX ADMIN — SUCRALFATE 1 G: 1 TABLET ORAL at 21:00

## 2023-01-01 RX ADMIN — Medication 3 MG: at 20:08

## 2023-01-01 RX ADMIN — PROPOFOL 10 MG: 10 INJECTION, EMULSION INTRAVENOUS at 09:38

## 2023-01-01 RX ADMIN — FUROSEMIDE 80 MG: 10 INJECTION, SOLUTION INTRAMUSCULAR; INTRAVENOUS at 09:08

## 2023-01-01 RX ADMIN — FAMOTIDINE 20 MG: 10 INJECTION, SOLUTION INTRAVENOUS at 08:38

## 2023-01-01 RX ADMIN — POTASSIUM CHLORIDE 40 MEQ: 1500 TABLET, EXTENDED RELEASE ORAL at 13:52

## 2023-01-01 RX ADMIN — ASPIRIN 81 MG CHEWABLE TABLET 81 MG: 81 TABLET CHEWABLE at 08:39

## 2023-01-01 RX ADMIN — Medication: at 23:30

## 2023-01-01 RX ADMIN — TAFAMIDIS 61 MG: 61 CAPSULE, LIQUID FILLED ORAL at 09:00

## 2023-01-01 RX ADMIN — SUCRALFATE 1 G: 1 TABLET ORAL at 12:17

## 2023-01-01 RX ADMIN — SUCRALFATE 1 G: 1 TABLET ORAL at 20:51

## 2023-01-01 RX ADMIN — POTASSIUM CHLORIDE 20 MEQ: 14.9 INJECTION, SOLUTION INTRAVENOUS at 08:47

## 2023-01-01 RX ADMIN — PANTOPRAZOLE SODIUM 40 MG: 40 INJECTION, POWDER, FOR SOLUTION INTRAVENOUS at 15:55

## 2023-01-01 RX ADMIN — TRAMADOL HYDROCHLORIDE 50 MG: 50 TABLET, COATED ORAL at 10:36

## 2023-01-01 RX ADMIN — AMIODARONE HYDROCHLORIDE 200 MG: 200 TABLET ORAL at 17:00

## 2023-01-01 RX ADMIN — PANTOPRAZOLE SODIUM 40 MG: 40 TABLET, DELAYED RELEASE ORAL at 06:16

## 2023-01-01 RX ADMIN — FUROSEMIDE 80 MG: 10 INJECTION, SOLUTION INTRAMUSCULAR; INTRAVENOUS at 08:39

## 2023-01-01 RX ADMIN — PIPERACILLIN SODIUM AND TAZOBACTAM SODIUM 3.38 G: 3; .375 INJECTION, SOLUTION INTRAVENOUS at 15:40

## 2023-01-01 RX ADMIN — TRAMADOL HYDROCHLORIDE 50 MG: 50 TABLET, COATED ORAL at 10:10

## 2023-01-01 RX ADMIN — EMPAGLIFLOZIN 10 MG: 10 TABLET, FILM COATED ORAL at 08:51

## 2023-01-01 RX ADMIN — TRAMADOL HYDROCHLORIDE 50 MG: 50 TABLET, COATED ORAL at 12:33

## 2023-01-01 RX ADMIN — EPLERENONE 25 MG: 25 TABLET, FILM COATED ORAL at 08:47

## 2023-01-01 RX ADMIN — ALPRAZOLAM 0.25 MG: 0.25 TABLET ORAL at 21:25

## 2023-01-01 RX ADMIN — METOLAZONE 5 MG: 5 TABLET ORAL at 18:35

## 2023-01-01 RX ADMIN — GUAIFENESIN 600 MG: 600 TABLET, EXTENDED RELEASE ORAL at 20:46

## 2023-01-01 RX ADMIN — Medication 3 MG: at 21:07

## 2023-01-01 RX ADMIN — FUROSEMIDE 10 MG/HR: 10 INJECTION, SOLUTION INTRAMUSCULAR; INTRAVENOUS at 10:53

## 2023-01-01 RX ADMIN — FUROSEMIDE 100 MG: 10 INJECTION, SOLUTION INTRAMUSCULAR; INTRAVENOUS at 14:06

## 2023-01-01 RX ADMIN — PANTOPRAZOLE SODIUM 40 MG: 40 TABLET, DELAYED RELEASE ORAL at 09:00

## 2023-01-01 RX ADMIN — SODIUM CHLORIDE 500 ML: 9 INJECTION, SOLUTION INTRAVENOUS at 16:09

## 2023-01-01 RX ADMIN — LIDOCAINE HYDROCHLORIDE 50 MG: 20 INJECTION, SOLUTION INFILTRATION; PERINEURAL at 09:34

## 2023-01-01 RX ADMIN — ASPIRIN 81 MG CHEWABLE TABLET 81 MG: 81 TABLET CHEWABLE at 09:09

## 2023-01-01 RX ADMIN — METOLAZONE 5 MG: 5 TABLET ORAL at 08:47

## 2023-01-01 RX ADMIN — GUAIFENESIN 600 MG: 600 TABLET, EXTENDED RELEASE ORAL at 09:09

## 2023-01-01 RX ADMIN — LEVOTHYROXINE SODIUM 25 MCG: 0.03 TABLET ORAL at 08:16

## 2023-01-01 RX ADMIN — ACETAMINOPHEN 650 MG: 325 TABLET ORAL at 16:09

## 2023-01-01 RX ADMIN — TRAMADOL HYDROCHLORIDE 50 MG: 50 TABLET, COATED ORAL at 21:54

## 2023-01-01 RX ADMIN — GUAIFENESIN 600 MG: 600 TABLET, EXTENDED RELEASE ORAL at 20:21

## 2023-01-01 RX ADMIN — TAFAMIDIS 61 MG: 61 CAPSULE, LIQUID FILLED ORAL at 12:35

## 2023-01-01 RX ADMIN — FENTANYL CITRATE 50 MCG: 50 INJECTION, SOLUTION INTRAMUSCULAR; INTRAVENOUS at 09:32

## 2023-01-01 RX ADMIN — ROSUVASTATIN 5 MG: 10 TABLET, FILM COATED ORAL at 08:55

## 2023-01-01 RX ADMIN — TAMSULOSIN HYDROCHLORIDE 0.4 MG: 0.4 CAPSULE ORAL at 08:46

## 2023-01-01 RX ADMIN — PROPOFOL 20 MG: 10 INJECTION, EMULSION INTRAVENOUS at 12:16

## 2023-01-01 RX ADMIN — ASPIRIN 81 MG CHEWABLE TABLET 81 MG: 81 TABLET CHEWABLE at 09:15

## 2023-01-01 RX ADMIN — TORSEMIDE 100 MG: 20 TABLET ORAL at 16:11

## 2023-01-01 RX ADMIN — EMPAGLIFLOZIN 10 MG: 10 TABLET, FILM COATED ORAL at 09:09

## 2023-01-01 RX ADMIN — ZOLPIDEM TARTRATE 5 MG: 5 TABLET ORAL at 23:54

## 2023-01-01 RX ADMIN — GUAIFENESIN 600 MG: 600 TABLET, EXTENDED RELEASE ORAL at 08:39

## 2023-01-01 RX ADMIN — PROPOFOL 20 MG: 10 INJECTION, EMULSION INTRAVENOUS at 12:13

## 2023-01-01 RX ADMIN — EMPAGLIFLOZIN 10 MG: 10 TABLET, FILM COATED ORAL at 10:10

## 2023-01-01 RX ADMIN — POTASSIUM CHLORIDE 20 MEQ: 14.9 INJECTION, SOLUTION INTRAVENOUS at 10:37

## 2023-01-01 RX ADMIN — GUAIFENESIN 600 MG: 600 TABLET, EXTENDED RELEASE ORAL at 10:10

## 2023-01-01 RX ADMIN — SUCRALFATE 1 G: 1 TABLET ORAL at 20:08

## 2023-01-01 RX ADMIN — PROPOFOL 10 MG: 10 INJECTION, EMULSION INTRAVENOUS at 09:40

## 2023-01-01 RX ADMIN — ROSUVASTATIN 5 MG: 10 TABLET, FILM COATED ORAL at 08:03

## 2023-01-01 RX ADMIN — FUROSEMIDE 20 MG/HR: 10 INJECTION, SOLUTION INTRAMUSCULAR; INTRAVENOUS at 18:15

## 2023-01-01 RX ADMIN — PANTOPRAZOLE SODIUM 40 MG: 40 TABLET, DELAYED RELEASE ORAL at 06:28

## 2023-01-01 RX ADMIN — TORSEMIDE 100 MG: 20 TABLET ORAL at 08:02

## 2023-01-01 RX ADMIN — ROSUVASTATIN 5 MG: 10 TABLET, FILM COATED ORAL at 09:20

## 2023-01-01 RX ADMIN — TAFAMIDIS 61 MG: 61 CAPSULE, LIQUID FILLED ORAL at 08:24

## 2023-01-01 RX ADMIN — Medication 3 MG: at 20:51

## 2023-01-01 RX ADMIN — POTASSIUM CHLORIDE 40 MEQ: 1.5 FOR SOLUTION ORAL at 16:30

## 2023-01-01 RX ADMIN — POTASSIUM CHLORIDE 40 MEQ: 750 TABLET, FILM COATED, EXTENDED RELEASE ORAL at 14:35

## 2023-01-01 RX ADMIN — TORSEMIDE 100 MG: 20 TABLET ORAL at 08:55

## 2023-01-01 RX ADMIN — SODIUM CHLORIDE 50 ML/HR: 9 INJECTION, SOLUTION INTRAVENOUS at 11:24

## 2023-01-01 RX ADMIN — PROPOFOL 30 MG: 10 INJECTION, EMULSION INTRAVENOUS at 12:14

## 2023-01-01 RX ADMIN — EPLERENONE 25 MG: 25 TABLET, FILM COATED ORAL at 08:56

## 2023-01-01 RX ADMIN — SODIUM CHLORIDE 50 ML/HR: 9 INJECTION, SOLUTION INTRAVENOUS at 08:39

## 2023-01-01 RX ADMIN — LEVOTHYROXINE SODIUM 25 MCG: 0.03 TABLET ORAL at 08:54

## 2023-01-01 RX ADMIN — POTASSIUM CHLORIDE 40 MEQ: 750 TABLET, FILM COATED, EXTENDED RELEASE ORAL at 08:47

## 2023-01-01 RX ADMIN — POLYETHYLENE GLYCOL 3350 17 G: 17 POWDER, FOR SOLUTION ORAL at 08:39

## 2023-01-01 RX ADMIN — LEVOTHYROXINE SODIUM 25 MCG: 0.03 TABLET ORAL at 09:20

## 2023-01-01 RX ADMIN — SUCRALFATE 1 G: 1 TABLET ORAL at 22:33

## 2023-01-01 RX ADMIN — LEVOTHYROXINE SODIUM 25 MCG: 0.03 TABLET ORAL at 06:28

## 2023-01-01 RX ADMIN — PANTOPRAZOLE SODIUM 40 MG: 40 TABLET, DELAYED RELEASE ORAL at 06:44

## 2023-01-01 RX ADMIN — TAFAMIDIS 61 MG: 61 CAPSULE, LIQUID FILLED ORAL at 14:02

## 2023-01-01 RX ADMIN — ROSUVASTATIN 5 MG: 10 TABLET, FILM COATED ORAL at 08:46

## 2023-01-01 RX ADMIN — HYDROCODONE BITARTRATE AND ACETAMINOPHEN 1 TABLET: 5; 325 TABLET ORAL at 10:24

## 2023-01-01 RX ADMIN — TRAMADOL HYDROCHLORIDE 50 MG: 50 TABLET, COATED ORAL at 22:34

## 2023-01-01 RX ADMIN — AMIODARONE HYDROCHLORIDE 200 MG: 200 TABLET ORAL at 08:39

## 2023-01-01 RX ADMIN — METOLAZONE 5 MG: 5 TABLET ORAL at 08:16

## 2023-01-01 RX ADMIN — FUROSEMIDE 80 MG: 10 INJECTION, SOLUTION INTRAMUSCULAR; INTRAVENOUS at 20:46

## 2023-01-01 RX ADMIN — TORSEMIDE 100 MG: 20 TABLET ORAL at 14:35

## 2023-01-01 RX ADMIN — METOLAZONE 2.5 MG: 2.5 TABLET ORAL at 09:57

## 2023-01-01 RX ADMIN — AMIODARONE HYDROCHLORIDE 200 MG: 200 TABLET ORAL at 09:20

## 2023-01-01 RX ADMIN — TAFAMIDIS 61 MG: 61 CAPSULE, LIQUID FILLED ORAL at 19:43

## 2023-01-01 RX ADMIN — LEVOTHYROXINE SODIUM 25 MCG: 0.03 TABLET ORAL at 08:47

## 2023-01-01 RX ADMIN — TAFAMIDIS 61 MG: 61 CAPSULE, LIQUID FILLED ORAL at 08:39

## 2023-01-01 RX ADMIN — LEVOTHYROXINE SODIUM 25 MCG: 0.03 TABLET ORAL at 10:10

## 2023-01-01 RX ADMIN — FUROSEMIDE 80 MG: 10 INJECTION, SOLUTION INTRAMUSCULAR; INTRAVENOUS at 11:14

## 2023-01-01 RX ADMIN — LEVOTHYROXINE SODIUM 25 MCG: 0.03 TABLET ORAL at 08:39

## 2023-01-01 RX ADMIN — SUCRALFATE 1 G: 1 TABLET ORAL at 06:44

## 2023-01-01 RX ADMIN — METOLAZONE 5 MG: 5 TABLET ORAL at 08:03

## 2023-01-01 RX ADMIN — TAFAMIDIS 61 MG: 61 CAPSULE, LIQUID FILLED ORAL at 08:46

## 2023-01-01 RX ADMIN — EPLERENONE 25 MG: 25 TABLET, FILM COATED ORAL at 10:11

## 2023-01-01 RX ADMIN — FUROSEMIDE 20 MG/HR: 10 INJECTION, SOLUTION INTRAMUSCULAR; INTRAVENOUS at 10:00

## 2023-01-01 RX ADMIN — POTASSIUM CHLORIDE 20 MEQ: 1.5 POWDER, FOR SOLUTION ORAL at 10:36

## 2023-01-01 RX ADMIN — AMIODARONE HYDROCHLORIDE 200 MG: 200 TABLET ORAL at 08:54

## 2023-01-01 RX ADMIN — ROSUVASTATIN 5 MG: 10 TABLET, FILM COATED ORAL at 08:24

## 2023-01-01 RX ADMIN — PANTOPRAZOLE SODIUM 40 MG: 40 TABLET, DELAYED RELEASE ORAL at 06:45

## 2023-01-01 RX ADMIN — PROPOFOL 10 MG: 10 INJECTION, EMULSION INTRAVENOUS at 09:42

## 2023-01-01 RX ADMIN — POTASSIUM CHLORIDE 20 MEQ: 14.9 INJECTION, SOLUTION INTRAVENOUS at 08:46

## 2023-01-01 RX ADMIN — LORAZEPAM 0.5 MG: 0.5 TABLET ORAL at 02:13

## 2023-01-01 RX ADMIN — FUROSEMIDE 80 MG: 10 INJECTION, SOLUTION INTRAMUSCULAR; INTRAVENOUS at 21:24

## 2023-01-01 RX ADMIN — ACETAMINOPHEN 650 MG: 325 TABLET ORAL at 03:32

## 2023-01-01 RX ADMIN — FUROSEMIDE 100 MG: 10 INJECTION, SOLUTION INTRAMUSCULAR; INTRAVENOUS at 08:16

## 2023-01-01 RX ADMIN — FUROSEMIDE 80 MG: 10 INJECTION, SOLUTION INTRAMUSCULAR; INTRAVENOUS at 10:00

## 2023-01-01 RX ADMIN — PROPOFOL 20 MG: 10 INJECTION, EMULSION INTRAVENOUS at 12:17

## 2023-01-01 RX ADMIN — Medication: at 06:15

## 2023-01-01 RX ADMIN — PROPOFOL 50 MG: 10 INJECTION, EMULSION INTRAVENOUS at 12:10

## 2023-01-01 RX ADMIN — FUROSEMIDE 80 MG: 10 INJECTION, SOLUTION INTRAMUSCULAR; INTRAVENOUS at 13:16

## 2023-01-01 RX ADMIN — GUAIFENESIN 600 MG: 600 TABLET, EXTENDED RELEASE ORAL at 08:51

## 2023-01-01 RX ADMIN — LORAZEPAM 0.5 MG: 0.5 TABLET ORAL at 10:47

## 2023-01-01 RX ADMIN — POTASSIUM CHLORIDE 20 MEQ: 1.5 POWDER, FOR SOLUTION ORAL at 16:11

## 2023-01-01 RX ADMIN — SUCRALFATE 1 G: 1 SUSPENSION ORAL at 06:28

## 2023-01-01 RX ADMIN — TRAMADOL HYDROCHLORIDE 50 MG: 50 TABLET, COATED ORAL at 08:39

## 2023-01-01 RX ADMIN — LIDOCAINE HYDROCHLORIDE 100 MG: 20 INJECTION INTRAVENOUS at 12:10

## 2023-01-01 RX ADMIN — FUROSEMIDE 80 MG: 10 INJECTION, SOLUTION INTRAMUSCULAR; INTRAVENOUS at 16:51

## 2023-01-01 RX ADMIN — PROPOFOL 25 MG: 10 INJECTION, EMULSION INTRAVENOUS at 09:36

## 2023-01-01 RX ADMIN — FUROSEMIDE 10 MG/HR: 10 INJECTION, SOLUTION INTRAMUSCULAR; INTRAVENOUS at 09:23

## 2023-01-01 RX ADMIN — TRAMADOL HYDROCHLORIDE 50 MG: 50 TABLET, COATED ORAL at 08:51

## 2023-01-01 RX ADMIN — TRAMADOL HYDROCHLORIDE 50 MG: 50 TABLET, COATED ORAL at 08:47

## 2023-01-01 RX ADMIN — GUAIFENESIN 600 MG: 600 TABLET, EXTENDED RELEASE ORAL at 21:24

## 2023-01-01 RX ADMIN — PANTOPRAZOLE SODIUM 40 MG: 40 INJECTION, POWDER, FOR SOLUTION INTRAVENOUS at 09:08

## 2023-01-01 RX ADMIN — TAFAMIDIS 61 MG: 61 CAPSULE, LIQUID FILLED ORAL at 09:22

## 2023-01-01 RX ADMIN — TAFAMIDIS 61 MG: 61 CAPSULE, LIQUID FILLED ORAL at 10:10

## 2023-01-01 RX ADMIN — EPLERENONE 25 MG: 25 TABLET, FILM COATED ORAL at 19:43

## 2023-01-01 RX ADMIN — LIDOCAINE HYDROCHLORIDE 50 MG: 20 INJECTION, SOLUTION INFILTRATION; PERINEURAL at 09:32

## 2023-01-01 RX ADMIN — AMIODARONE HYDROCHLORIDE 200 MG: 200 TABLET ORAL at 08:24

## 2023-01-01 RX ADMIN — SUCRALFATE 1 G: 1 TABLET ORAL at 06:20

## 2023-01-01 RX ADMIN — LEVOTHYROXINE SODIUM 25 MCG: 0.03 TABLET ORAL at 06:54

## 2023-01-01 RX ADMIN — TRAMADOL HYDROCHLORIDE 50 MG: 50 TABLET, COATED ORAL at 20:21

## 2023-01-01 RX ADMIN — MIDODRINE HYDROCHLORIDE 5 MG: 2.5 TABLET ORAL at 15:40

## 2023-01-01 RX ADMIN — FAMOTIDINE 20 MG: 10 INJECTION, SOLUTION INTRAVENOUS at 11:25

## 2023-01-01 RX ADMIN — AMIODARONE HYDROCHLORIDE 200 MG: 200 TABLET ORAL at 08:03

## 2023-01-01 RX ADMIN — PANTOPRAZOLE SODIUM 40 MG: 40 TABLET, DELAYED RELEASE ORAL at 16:02

## 2023-01-01 RX ADMIN — POTASSIUM CHLORIDE 40 MEQ: 1500 TABLET, EXTENDED RELEASE ORAL at 05:30

## 2023-01-01 RX ADMIN — POTASSIUM CHLORIDE 20 MEQ: 14.9 INJECTION, SOLUTION INTRAVENOUS at 14:54

## 2023-01-01 RX ADMIN — POTASSIUM CHLORIDE 20 MEQ: 14.9 INJECTION, SOLUTION INTRAVENOUS at 12:13

## 2023-01-01 RX ADMIN — LEVOTHYROXINE SODIUM 25 MCG: 0.03 TABLET ORAL at 09:09

## 2023-01-01 RX ADMIN — POTASSIUM CHLORIDE 20 MEQ: 14.9 INJECTION, SOLUTION INTRAVENOUS at 06:19

## 2023-01-01 RX ADMIN — SUCRALFATE 1 G: 1 TABLET ORAL at 12:30

## 2023-01-01 RX ADMIN — POTASSIUM CHLORIDE 20 MEQ: 14.9 INJECTION, SOLUTION INTRAVENOUS at 10:36

## 2023-01-01 RX ADMIN — SUCRALFATE 1 G: 1 TABLET ORAL at 17:43

## 2023-01-01 RX ADMIN — FUROSEMIDE 40 MG: 10 INJECTION, SOLUTION INTRAMUSCULAR; INTRAVENOUS at 10:39

## 2023-01-01 RX ADMIN — FUROSEMIDE 80 MG: 10 INJECTION, SOLUTION INTRAMUSCULAR; INTRAVENOUS at 10:21

## 2023-01-01 RX ADMIN — POTASSIUM CHLORIDE 20 MEQ: 14.9 INJECTION, SOLUTION INTRAVENOUS at 08:56

## 2023-01-01 RX ADMIN — SUCRALFATE 1 G: 1 TABLET ORAL at 16:52

## 2023-01-01 RX ADMIN — POTASSIUM CHLORIDE 20 MEQ: 14.9 INJECTION, SOLUTION INTRAVENOUS at 08:50

## 2023-01-01 RX ADMIN — TRAMADOL HYDROCHLORIDE 50 MG: 50 TABLET, COATED ORAL at 14:25

## 2023-01-01 RX ADMIN — APIXABAN 2.5 MG: 5 TABLET, FILM COATED ORAL at 18:10

## 2023-01-01 RX ADMIN — CHLOROTHIAZIDE SODIUM 250 MG: 500 INJECTION, POWDER, LYOPHILIZED, FOR SOLUTION INTRAVENOUS at 10:52

## 2023-01-01 RX ADMIN — TAMSULOSIN HYDROCHLORIDE 0.4 MG: 0.4 CAPSULE ORAL at 17:17

## 2023-01-01 RX ADMIN — SUCRALFATE 1 G: 1 TABLET ORAL at 06:19

## 2023-01-01 RX ADMIN — POTASSIUM CHLORIDE 40 MEQ: 1500 TABLET, EXTENDED RELEASE ORAL at 06:19

## 2023-01-01 RX ADMIN — SUCRALFATE 1 G: 1 TABLET ORAL at 12:33

## 2023-01-01 RX ADMIN — FUROSEMIDE 80 MG: 80 TABLET ORAL at 08:47

## 2023-01-01 RX ADMIN — EPLERENONE 25 MG: 25 TABLET, FILM COATED ORAL at 12:35

## 2023-01-01 RX ADMIN — ROSUVASTATIN 5 MG: 10 TABLET, FILM COATED ORAL at 08:16

## 2023-01-01 RX ADMIN — TRAMADOL HYDROCHLORIDE 50 MG: 50 TABLET, COATED ORAL at 08:24

## 2023-01-01 RX ADMIN — FUROSEMIDE 40 MG: 10 INJECTION, SOLUTION INTRAMUSCULAR; INTRAVENOUS at 16:49

## 2023-01-01 RX ADMIN — PROPOFOL 20 MG: 10 INJECTION, EMULSION INTRAVENOUS at 12:11

## 2023-01-01 RX ADMIN — SUCRALFATE 1 G: 1 TABLET ORAL at 06:45

## 2023-01-01 RX ADMIN — POTASSIUM CHLORIDE 40 MEQ: 750 TABLET, FILM COATED, EXTENDED RELEASE ORAL at 09:20

## 2023-01-01 RX ADMIN — POLYETHYLENE GLYCOL 3350 17 G: 17 POWDER, FOR SOLUTION ORAL at 10:10

## 2023-01-01 RX ADMIN — Medication 3 MG: at 22:02

## 2023-01-01 RX ADMIN — POTASSIUM CHLORIDE 20 MEQ: 14.9 INJECTION, SOLUTION INTRAVENOUS at 05:30

## 2023-01-01 RX ADMIN — PANTOPRAZOLE SODIUM 40 MG: 40 INJECTION, POWDER, FOR SOLUTION INTRAVENOUS at 08:44

## 2023-01-01 RX ADMIN — EMPAGLIFLOZIN 10 MG: 10 TABLET, FILM COATED ORAL at 08:39

## 2023-01-01 RX ADMIN — METOLAZONE 5 MG: 5 TABLET ORAL at 08:54

## 2023-01-01 RX ADMIN — AMIODARONE HYDROCHLORIDE 200 MG: 200 TABLET ORAL at 10:10

## 2023-01-01 RX ADMIN — FENTANYL CITRATE 50 MCG: 50 INJECTION, SOLUTION INTRAMUSCULAR; INTRAVENOUS at 09:34

## 2023-01-01 RX ADMIN — POTASSIUM CHLORIDE 40 MEQ: 1500 TABLET, EXTENDED RELEASE ORAL at 08:39

## 2023-01-01 RX ADMIN — Medication 3 MG: at 21:25

## 2023-01-01 RX ADMIN — AMIODARONE HYDROCHLORIDE 200 MG: 200 TABLET ORAL at 09:09

## 2023-01-01 RX ADMIN — SUCRALFATE 1 G: 1 TABLET ORAL at 12:54

## 2023-01-01 RX ADMIN — AMIODARONE HYDROCHLORIDE 200 MG: 200 TABLET ORAL at 08:51

## 2023-01-01 RX ADMIN — TRAMADOL HYDROCHLORIDE 50 MG: 50 TABLET, COATED ORAL at 20:46

## 2023-01-01 RX ADMIN — FUROSEMIDE 80 MG: 80 TABLET ORAL at 17:18

## 2023-01-01 RX ADMIN — PROPOFOL 25 MG: 10 INJECTION, EMULSION INTRAVENOUS at 09:32

## 2023-01-01 RX ADMIN — SODIUM CHLORIDE 500 ML: 9 INJECTION, SOLUTION INTRAVENOUS at 16:07

## 2023-01-01 RX ADMIN — TAMSULOSIN HYDROCHLORIDE 0.4 MG: 0.4 CAPSULE ORAL at 08:24

## 2023-01-01 RX ADMIN — PANTOPRAZOLE SODIUM 40 MG: 40 TABLET, DELAYED RELEASE ORAL at 18:10

## 2023-01-01 RX ADMIN — METOLAZONE 5 MG: 2.5 TABLET ORAL at 09:00

## 2023-01-01 RX ADMIN — PANTOPRAZOLE SODIUM 40 MG: 40 TABLET, DELAYED RELEASE ORAL at 06:19

## 2023-01-01 RX ADMIN — PANTOPRAZOLE SODIUM 40 MG: 40 TABLET, DELAYED RELEASE ORAL at 17:17

## 2023-01-01 RX ADMIN — FUROSEMIDE 80 MG: 10 INJECTION, SOLUTION INTRAMUSCULAR; INTRAVENOUS at 20:22

## 2023-01-01 RX ADMIN — POTASSIUM CHLORIDE 20 MEQ: 1.5 POWDER, FOR SOLUTION ORAL at 20:51

## 2023-01-01 RX ADMIN — FUROSEMIDE 80 MG: 80 TABLET ORAL at 08:24

## 2023-01-01 RX ADMIN — LEVOTHYROXINE SODIUM 25 MCG: 0.03 TABLET ORAL at 08:03

## 2023-01-01 RX ADMIN — SUCRALFATE 1 G: 1 TABLET ORAL at 06:16

## 2023-01-01 RX ADMIN — ACETAMINOPHEN 650 MG: 325 TABLET ORAL at 03:08

## 2023-01-01 RX ADMIN — ONDANSETRON 4 MG: 2 INJECTION, SOLUTION INTRAMUSCULAR; INTRAVENOUS at 16:55

## 2023-01-01 RX ADMIN — EPLERENONE 25 MG: 25 TABLET, FILM COATED ORAL at 14:02

## 2023-01-01 RX ADMIN — ASPIRIN 81 MG CHEWABLE TABLET 81 MG: 81 TABLET CHEWABLE at 10:10

## 2023-01-01 RX ADMIN — TRAMADOL HYDROCHLORIDE 50 MG: 50 TABLET, COATED ORAL at 21:06

## 2023-01-01 RX ADMIN — SUCRALFATE 1 G: 1 TABLET ORAL at 16:32

## 2023-01-01 RX ADMIN — LORAZEPAM 0.5 MG: 2 INJECTION INTRAMUSCULAR; INTRAVENOUS at 11:25

## 2023-01-01 RX ADMIN — PANTOPRAZOLE SODIUM 40 MG: 40 INJECTION, POWDER, FOR SOLUTION INTRAVENOUS at 08:39

## 2023-01-01 SDOH — SOCIAL STABILITY: SOCIAL INSECURITY: ARE YOU OR HAVE YOU BEEN THREATENED OR ABUSED PHYSICALLY, EMOTIONALLY, OR SEXUALLY BY ANYONE?: NO

## 2023-01-01 SDOH — HEALTH STABILITY: MENTAL HEALTH: CURRENT SMOKER: 0

## 2023-01-01 SDOH — SOCIAL STABILITY: SOCIAL INSECURITY: WERE YOU ABLE TO COMPLETE ALL THE BEHAVIORAL HEALTH SCREENINGS?: YES

## 2023-01-01 SDOH — ECONOMIC STABILITY: HOUSING INSECURITY
IN THE LAST 12 MONTHS, WAS THERE A TIME WHEN YOU DID NOT HAVE A STEADY PLACE TO SLEEP OR SLEPT IN A SHELTER (INCLUDING NOW)?: NO

## 2023-01-01 SDOH — SOCIAL STABILITY: SOCIAL INSECURITY: ARE THERE ANY APPARENT SIGNS OF INJURIES/BEHAVIORS THAT COULD BE RELATED TO ABUSE/NEGLECT?: NO

## 2023-01-01 SDOH — ECONOMIC STABILITY: INCOME INSECURITY: IN THE LAST 12 MONTHS, WAS THERE A TIME WHEN YOU WERE NOT ABLE TO PAY THE MORTGAGE OR RENT ON TIME?: NO

## 2023-01-01 SDOH — SOCIAL STABILITY: SOCIAL INSECURITY: ABUSE: ADULT

## 2023-01-01 SDOH — SOCIAL STABILITY: SOCIAL INSECURITY: DO YOU FEEL UNSAFE GOING BACK TO THE PLACE WHERE YOU ARE LIVING?: NO

## 2023-01-01 SDOH — ECONOMIC STABILITY: FOOD INSECURITY: WITHIN THE PAST 12 MONTHS, YOU WORRIED THAT YOUR FOOD WOULD RUN OUT BEFORE YOU GOT MONEY TO BUY MORE.: NEVER TRUE

## 2023-01-01 SDOH — ECONOMIC STABILITY: FOOD INSECURITY: WITHIN THE PAST 12 MONTHS, THE FOOD YOU BOUGHT JUST DIDN'T LAST AND YOU DIDN'T HAVE MONEY TO GET MORE.: NEVER TRUE

## 2023-01-01 SDOH — SOCIAL STABILITY: SOCIAL INSECURITY: HAS ANYONE EVER THREATENED TO HURT YOUR FAMILY OR YOUR PETS?: NO

## 2023-01-01 SDOH — SOCIAL STABILITY: SOCIAL INSECURITY: DO YOU FEEL ANYONE HAS EXPLOITED OR TAKEN ADVANTAGE OF YOU FINANCIALLY OR OF YOUR PERSONAL PROPERTY?: NO

## 2023-01-01 SDOH — ECONOMIC STABILITY: INCOME INSECURITY: IN THE PAST 12 MONTHS, HAS THE ELECTRIC, GAS, OIL, OR WATER COMPANY THREATENED TO SHUT OFF SERVICE IN YOUR HOME?: NO

## 2023-01-01 SDOH — SOCIAL STABILITY: SOCIAL INSECURITY: HAVE YOU HAD THOUGHTS OF HARMING ANYONE ELSE?: YES

## 2023-01-01 SDOH — ECONOMIC STABILITY: TRANSPORTATION INSECURITY
IN THE PAST 12 MONTHS, HAS THE LACK OF TRANSPORTATION KEPT YOU FROM MEDICAL APPOINTMENTS OR FROM GETTING MEDICATIONS?: NO

## 2023-01-01 SDOH — HEALTH STABILITY: MENTAL HEALTH: BEHAVIORS/MOOD: ANXIOUS

## 2023-01-01 SDOH — SOCIAL STABILITY: SOCIAL INSECURITY: DOES ANYONE TRY TO KEEP YOU FROM HAVING/CONTACTING OTHER FRIENDS OR DOING THINGS OUTSIDE YOUR HOME?: NO

## 2023-01-01 SDOH — SOCIAL STABILITY: SOCIAL INSECURITY: HAVE YOU HAD THOUGHTS OF HARMING ANYONE ELSE?: NO

## 2023-01-01 SDOH — ECONOMIC STABILITY: INCOME INSECURITY: HOW HARD IS IT FOR YOU TO PAY FOR THE VERY BASICS LIKE FOOD, HOUSING, MEDICAL CARE, AND HEATING?: NOT HARD AT ALL

## 2023-01-01 SDOH — ECONOMIC STABILITY: TRANSPORTATION INSECURITY
IN THE PAST 12 MONTHS, HAS LACK OF TRANSPORTATION KEPT YOU FROM MEETINGS, WORK, OR FROM GETTING THINGS NEEDED FOR DAILY LIVING?: NO

## 2023-01-01 ASSESSMENT — COGNITIVE AND FUNCTIONAL STATUS - GENERAL
DRESSING REGULAR LOWER BODY CLOTHING: A LITTLE
DAILY ACTIVITIY SCORE: 19
MOBILITY SCORE: 24
MOVING TO AND FROM BED TO CHAIR: A LITTLE
CLIMB 3 TO 5 STEPS WITH RAILING: A LITTLE
CLIMB 3 TO 5 STEPS WITH RAILING: A LOT
HELP NEEDED FOR BATHING: A LITTLE
MOBILITY SCORE: 17
DAILY ACTIVITIY SCORE: 21
DAILY ACTIVITIY SCORE: 21
MOVING TO AND FROM BED TO CHAIR: A LITTLE
CLIMB 3 TO 5 STEPS WITH RAILING: A LITTLE
CLIMB 3 TO 5 STEPS WITH RAILING: A LOT
CLIMB 3 TO 5 STEPS WITH RAILING: A LOT
MOVING TO AND FROM BED TO CHAIR: A LITTLE
PATIENT BASELINE BEDBOUND: NO
DRESSING REGULAR UPPER BODY CLOTHING: A LITTLE
DRESSING REGULAR LOWER BODY CLOTHING: A LITTLE
MOBILITY SCORE: 24
TURNING FROM BACK TO SIDE WHILE IN FLAT BAD: A LITTLE
HELP NEEDED FOR BATHING: A LITTLE
MOBILITY SCORE: 23
STANDING UP FROM CHAIR USING ARMS: A LITTLE
HELP NEEDED FOR BATHING: A LITTLE
CLIMB 3 TO 5 STEPS WITH RAILING: A LOT
TOILETING: A LITTLE
WALKING IN HOSPITAL ROOM: A LITTLE
DAILY ACTIVITIY SCORE: 24
MOVING FROM LYING ON BACK TO SITTING ON SIDE OF FLAT BED WITH BEDRAILS: A LITTLE
DAILY ACTIVITIY SCORE: 24
WALKING IN HOSPITAL ROOM: A LITTLE
MOBILITY SCORE: 19
PATIENT BASELINE BEDBOUND: NO
CLIMB 3 TO 5 STEPS WITH RAILING: A LITTLE
STANDING UP FROM CHAIR USING ARMS: A LITTLE
HELP NEEDED FOR BATHING: A LITTLE
CLIMB 3 TO 5 STEPS WITH RAILING: A LITTLE
DAILY ACTIVITIY SCORE: 23
TURNING FROM BACK TO SIDE WHILE IN FLAT BAD: A LITTLE
DRESSING REGULAR LOWER BODY CLOTHING: A LITTLE
MOVING TO AND FROM BED TO CHAIR: A LITTLE
DAILY ACTIVITIY SCORE: 19
MOVING FROM LYING ON BACK TO SITTING ON SIDE OF FLAT BED WITH BEDRAILS: A LITTLE
DAILY ACTIVITIY SCORE: 23
WALKING IN HOSPITAL ROOM: A LITTLE
TURNING FROM BACK TO SIDE WHILE IN FLAT BAD: A LITTLE
MOBILITY SCORE: 24
DAILY ACTIVITIY SCORE: 24
HELP NEEDED FOR BATHING: A LITTLE
STANDING UP FROM CHAIR USING ARMS: A LITTLE
DRESSING REGULAR LOWER BODY CLOTHING: A LOT
TOILETING: A LITTLE
WALKING IN HOSPITAL ROOM: A LITTLE
WALKING IN HOSPITAL ROOM: A LITTLE
MOVING FROM LYING ON BACK TO SITTING ON SIDE OF FLAT BED WITH BEDRAILS: A LITTLE
HELP NEEDED FOR BATHING: A LITTLE
DAILY ACTIVITIY SCORE: 24
TURNING FROM BACK TO SIDE WHILE IN FLAT BAD: A LITTLE
DRESSING REGULAR UPPER BODY CLOTHING: A LITTLE
MOBILITY SCORE: 19
MOBILITY SCORE: 19
DAILY ACTIVITIY SCORE: 21
HELP NEEDED FOR BATHING: A LITTLE
MOBILITY SCORE: 21
MOBILITY SCORE: 24
MOBILITY SCORE: 23
DAILY ACTIVITIY SCORE: 22
MOBILITY SCORE: 21
MOVING FROM LYING ON BACK TO SITTING ON SIDE OF FLAT BED WITH BEDRAILS: A LITTLE
STANDING UP FROM CHAIR USING ARMS: A LITTLE
PATIENT BASELINE BEDBOUND: NO
WALKING IN HOSPITAL ROOM: A LITTLE
DAILY ACTIVITIY SCORE: 24
TURNING FROM BACK TO SIDE WHILE IN FLAT BAD: A LITTLE
CLIMB 3 TO 5 STEPS WITH RAILING: A LITTLE
DRESSING REGULAR LOWER BODY CLOTHING: A LITTLE
MOVING TO AND FROM BED TO CHAIR: A LITTLE
CLIMB 3 TO 5 STEPS WITH RAILING: A LOT
DAILY ACTIVITIY SCORE: 22
MOBILITY SCORE: 22
WALKING IN HOSPITAL ROOM: A LITTLE
WALKING IN HOSPITAL ROOM: A LITTLE
DRESSING REGULAR UPPER BODY CLOTHING: A LITTLE
MOBILITY SCORE: 23
DRESSING REGULAR UPPER BODY CLOTHING: A LITTLE
DRESSING REGULAR LOWER BODY CLOTHING: A LITTLE
MOBILITY SCORE: 20
DRESSING REGULAR LOWER BODY CLOTHING: A LITTLE
DRESSING REGULAR UPPER BODY CLOTHING: A LITTLE
TOILETING: A LITTLE
DAILY ACTIVITIY SCORE: 24
STANDING UP FROM CHAIR USING ARMS: A LITTLE
DAILY ACTIVITIY SCORE: 24
DRESSING REGULAR UPPER BODY CLOTHING: A LITTLE
CLIMB 3 TO 5 STEPS WITH RAILING: A LOT
DRESSING REGULAR LOWER BODY CLOTHING: A LITTLE
MOBILITY SCORE: 17
DRESSING REGULAR LOWER BODY CLOTHING: A LOT
MOBILITY SCORE: 18
STANDING UP FROM CHAIR USING ARMS: A LITTLE
CLIMB 3 TO 5 STEPS WITH RAILING: A LITTLE
CLIMB 3 TO 5 STEPS WITH RAILING: A LOT
MOVING TO AND FROM BED TO CHAIR: A LITTLE
MOBILITY SCORE: 24
PATIENT BASELINE BEDBOUND: NO
MOVING FROM LYING ON BACK TO SITTING ON SIDE OF FLAT BED WITH BEDRAILS: A LITTLE
DAILY ACTIVITIY SCORE: 21
MOVING TO AND FROM BED TO CHAIR: A LITTLE
CLIMB 3 TO 5 STEPS WITH RAILING: A LITTLE
MOVING TO AND FROM BED TO CHAIR: A LITTLE
CLIMB 3 TO 5 STEPS WITH RAILING: A LOT
HELP NEEDED FOR BATHING: A LITTLE
DAILY ACTIVITIY SCORE: 23
MOBILITY SCORE: 21
DRESSING REGULAR LOWER BODY CLOTHING: A LITTLE
MOBILITY SCORE: 17
WALKING IN HOSPITAL ROOM: A LITTLE
STANDING UP FROM CHAIR USING ARMS: A LITTLE
DAILY ACTIVITIY SCORE: 24
MOBILITY SCORE: 24
CLIMB 3 TO 5 STEPS WITH RAILING: A LITTLE
WALKING IN HOSPITAL ROOM: A LITTLE
STANDING UP FROM CHAIR USING ARMS: A LITTLE
HELP NEEDED FOR BATHING: A LITTLE
MOBILITY SCORE: 22
DAILY ACTIVITIY SCORE: 22
DRESSING REGULAR LOWER BODY CLOTHING: A LITTLE
WALKING IN HOSPITAL ROOM: A LITTLE
TURNING FROM BACK TO SIDE WHILE IN FLAT BAD: A LITTLE

## 2023-01-01 ASSESSMENT — ENCOUNTER SYMPTOMS
ACTIVITY CHANGE: 0
DEPRESSION: 0
DEPRESSION: 0
LOSS OF SENSATION IN FEET: 0
OCCASIONAL FEELINGS OF UNSTEADINESS: 0
DEPRESSION: 0
POLYDIPSIA: 0
CONFUSION: 0
OCCASIONAL FEELINGS OF UNSTEADINESS: 1
APPETITE CHANGE: 0
ABDOMINAL DISTENTION: 0
COUGH: 0
ACTIVITY CHANGE: 0
EYES NEGATIVE: 1
CHILLS: 0
TROUBLE SWALLOWING: 0
CHEST TIGHTNESS: 0
BLOOD IN STOOL: 0
WHEEZING: 0
CONFUSION: 0
FEVER: 0
COUGH: 0
WHEEZING: 0
DIARRHEA: 0
FEVER: 0
BLOATING: 1
HEADACHES: 0
COUGH: 1
WEAKNESS: 1
DYSURIA: 0
ACTIVITY CHANGE: 0
EYES NEGATIVE: 1
WHEEZING: 0
WEAKNESS: 0
CHILLS: 0
OCCASIONAL FEELINGS OF UNSTEADINESS: 0
DEPRESSION: 0
CHILLS: 0
FACIAL SWELLING: 0
LIGHT-HEADEDNESS: 0
MUSCULOSKELETAL NEGATIVE: 1
FEVER: 0
ACTIVITY CHANGE: 0
HEMATURIA: 0
PALPITATIONS: 0
DIARRHEA: 0
DEPRESSION: 0
WHEEZING: 0
PALPITATIONS: 0
LOSS OF SENSATION IN FEET: 1
FEVER: 0
ABDOMINAL DISTENTION: 0
WHEEZING: 0
PALPITATIONS: 0
DYSURIA: 0
DIARRHEA: 0
ABDOMINAL PAIN: 0
CONFUSION: 0
DEPRESSION: 0
CONFUSION: 0
DEPRESSION: 0
WEAKNESS: 0
WHEEZING: 0
PALPITATIONS: 0
ABDOMINAL PAIN: 0
COUGH: 0
ABDOMINAL DISTENTION: 0
WEAKNESS: 0
EYES NEGATIVE: 1
HEMATURIA: 0
CHEST TIGHTNESS: 0
NUMBNESS: 0
PALPITATIONS: 0
DYSURIA: 0
COUGH: 0
VOMITING: 0
FEVER: 0
ACTIVITY CHANGE: 1
LOSS OF SENSATION IN FEET: 1
OCCASIONAL FEELINGS OF UNSTEADINESS: 1
SHORTNESS OF BREATH: 0
WEIGHT GAIN: 1
DIAPHORESIS: 0
LOSS OF SENSATION IN FEET: 1
CHILLS: 0
BLOOD IN STOOL: 0
SHORTNESS OF BREATH: 0
DYSURIA: 0
CHILLS: 0
LIGHT-HEADEDNESS: 0
WHEEZING: 0
COLOR CHANGE: 0
EYES NEGATIVE: 1
COLOR CHANGE: 0
BLOOD IN STOOL: 0
SHORTNESS OF BREATH: 1
CHILLS: 0
ABDOMINAL PAIN: 0
NAUSEA: 0
JOINT SWELLING: 0
LIGHT-HEADEDNESS: 0
DIARRHEA: 0
LOSS OF SENSATION IN FEET: 1
CONFUSION: 0
PALPITATIONS: 0
OCCASIONAL FEELINGS OF UNSTEADINESS: 0
WHEEZING: 0
ABDOMINAL DISTENTION: 0
LOSS OF SENSATION IN FEET: 1
SHORTNESS OF BREATH: 0
JOINT SWELLING: 1
LOSS OF SENSATION IN FEET: 1
SHORTNESS OF BREATH: 0
EYE PAIN: 0
PALPITATIONS: 0
ABDOMINAL PAIN: 0
FATIGUE: 1
VOMITING: 0
DIZZINESS: 0
FEVER: 0
OCCASIONAL FEELINGS OF UNSTEADINESS: 1
BLOOD IN STOOL: 1
COUGH: 0
SHORTNESS OF BREATH: 1
SLEEP DISTURBANCE: 0
CONSTIPATION: 0
PALPITATIONS: 0
ORTHOPNEA: 0
ORTHOPNEA: 1
ACTIVITY CHANGE: 0
FEVER: 0
SPUTUM PRODUCTION: 1
EYES NEGATIVE: 1
FATIGUE: 1
LIGHT-HEADEDNESS: 0
SHORTNESS OF BREATH: 0
DIZZINESS: 0
FATIGUE: 1
PALPITATIONS: 0
HALLUCINATIONS: 0
ABDOMINAL DISTENTION: 0
PALPITATIONS: 0
SHORTNESS OF BREATH: 1
VOMITING: 0
ABDOMINAL DISTENTION: 0
SHORTNESS OF BREATH: 1
CHILLS: 0
CHEST TIGHTNESS: 0
ROS GI COMMENTS: LOOSE STOOLS
PND: 0
CONFUSION: 0
FEVER: 0
DIZZINESS: 1
DEPRESSION: 0
DEPRESSION: 0
PALPITATIONS: 0
FATIGUE: 1
FATIGUE: 1
EYES NEGATIVE: 1
OCCASIONAL FEELINGS OF UNSTEADINESS: 1
ORTHOPNEA: 0
ABDOMINAL PAIN: 0
CONFUSION: 0
PALPITATIONS: 0
HEMATOCHEZIA: 0
VOMITING: 0
HEMATURIA: 0
CONFUSION: 0
FATIGUE: 1
WHEEZING: 0
FEVER: 0
SHORTNESS OF BREATH: 1
OCCASIONAL FEELINGS OF UNSTEADINESS: 1
LIGHT-HEADEDNESS: 0
HEMOPTYSIS: 0
DIARRHEA: 0
FEVER: 0
CHEST TIGHTNESS: 0
INSOMNIA: 1
COUGH: 0
DECREASED APPETITE: 0
CARDIOVASCULAR NEGATIVE: 1
FEVER: 0
VOMITING: 0
OCCASIONAL FEELINGS OF UNSTEADINESS: 1
BACK PAIN: 0
SHORTNESS OF BREATH: 1
COUGH: 0
CHILLS: 0
HEMATURIA: 0
DYSPNEA ON EXERTION: 1
LOSS OF BALANCE: 1
WEAKNESS: 1
CHEST TIGHTNESS: 0
DYSURIA: 0
ORTHOPNEA: 0
CHEST TIGHTNESS: 0
ACTIVITY CHANGE: 0
BLOOD IN STOOL: 0
ABDOMINAL PAIN: 0
LOSS OF SENSATION IN FEET: 0
DEPRESSION: 0
FREQUENCY: 0
SHORTNESS OF BREATH: 0
HEMATURIA: 0
VOMITING: 0
DEPRESSION: 0
BLOOD IN STOOL: 0
WHEEZING: 0
OCCASIONAL FEELINGS OF UNSTEADINESS: 0
EYES NEGATIVE: 1
FLANK PAIN: 0
WOUND: 0
CHEST TIGHTNESS: 0
NAUSEA: 0
LOSS OF SENSATION IN FEET: 0
LIGHT-HEADEDNESS: 0
BLOOD IN STOOL: 1
BLOOD IN STOOL: 1
LOSS OF SENSATION IN FEET: 0
LIGHT-HEADEDNESS: 0
WHEEZING: 0
OCCASIONAL FEELINGS OF UNSTEADINESS: 0
PALPITATIONS: 0
RESPIRATORY NEGATIVE: 1
SHORTNESS OF BREATH: 0
WEAKNESS: 0
WHEEZING: 0
CHEST TIGHTNESS: 0
WEAKNESS: 1
BLOOD IN STOOL: 1
NAUSEA: 0
BLOOD IN STOOL: 1
LOSS OF SENSATION IN FEET: 1
CONFUSION: 0
DEPRESSION: 0
ACTIVITY CHANGE: 0
WHEEZING: 0
ENDOCRINE NEGATIVE: 1
WEAKNESS: 0
SHORTNESS OF BREATH: 1
HEMATURIA: 0
COUGH: 0
OCCASIONAL FEELINGS OF UNSTEADINESS: 0
FEVER: 0
WEAKNESS: 0
BLOOD IN STOOL: 0
LOSS OF SENSATION IN FEET: 0
HEMATURIA: 0
FEVER: 0
HEMATURIA: 0
LIGHT-HEADEDNESS: 0
DYSURIA: 0
EYES NEGATIVE: 1
OCCASIONAL FEELINGS OF UNSTEADINESS: 1
PALPITATIONS: 0
DEPRESSION: 0
ABDOMINAL DISTENTION: 0
WEAKNESS: 0
OCCASIONAL FEELINGS OF UNSTEADINESS: 0
WEAKNESS: 0
CONFUSION: 0
NAUSEA: 0
WEAKNESS: 0
ORTHOPNEA: 0
LOSS OF SENSATION IN FEET: 0
CONSTIPATION: 0
APPETITE CHANGE: 0
NAUSEA: 0
LIGHT-HEADEDNESS: 0
ACTIVITY CHANGE: 0
LOSS OF SENSATION IN FEET: 1
ACTIVITY CHANGE: 0
SHORTNESS OF BREATH: 1
WEAKNESS: 0
COUGH: 1
HEMATEMESIS: 0
DEPRESSION: 0
LIGHT-HEADEDNESS: 0
BRUISES/BLEEDS EASILY: 0
PSYCHIATRIC NEGATIVE: 1
LOSS OF SENSATION IN FEET: 1
DIARRHEA: 0
HEMATURIA: 0
SORE THROAT: 0
CHEST TIGHTNESS: 0
NAUSEA: 0
ABDOMINAL DISTENTION: 0
PHOTOPHOBIA: 0
OCCASIONAL FEELINGS OF UNSTEADINESS: 1
DEPRESSION: 0
WEAKNESS: 0
CHILLS: 0
CHILLS: 0
ABDOMINAL DISTENTION: 0
FEVER: 0
TREMORS: 0
COUGH: 0

## 2023-01-01 ASSESSMENT — PAIN - FUNCTIONAL ASSESSMENT

## 2023-01-01 ASSESSMENT — COLUMBIA-SUICIDE SEVERITY RATING SCALE - C-SSRS
6. HAVE YOU EVER DONE ANYTHING, STARTED TO DO ANYTHING, OR PREPARED TO DO ANYTHING TO END YOUR LIFE?: NO
6. HAVE YOU EVER DONE ANYTHING, STARTED TO DO ANYTHING, OR PREPARED TO DO ANYTHING TO END YOUR LIFE?: NO
2. HAVE YOU ACTUALLY HAD ANY THOUGHTS OF KILLING YOURSELF?: NO
2. HAVE YOU ACTUALLY HAD ANY THOUGHTS OF KILLING YOURSELF?: NO
1. IN THE PAST MONTH, HAVE YOU WISHED YOU WERE DEAD OR WISHED YOU COULD GO TO SLEEP AND NOT WAKE UP?: NO
2. HAVE YOU ACTUALLY HAD ANY THOUGHTS OF KILLING YOURSELF?: NO
6. HAVE YOU EVER DONE ANYTHING, STARTED TO DO ANYTHING, OR PREPARED TO DO ANYTHING TO END YOUR LIFE?: NO
6. HAVE YOU EVER DONE ANYTHING, STARTED TO DO ANYTHING, OR PREPARED TO DO ANYTHING TO END YOUR LIFE?: NO
1. IN THE PAST MONTH, HAVE YOU WISHED YOU WERE DEAD OR WISHED YOU COULD GO TO SLEEP AND NOT WAKE UP?: NO
2. HAVE YOU ACTUALLY HAD ANY THOUGHTS OF KILLING YOURSELF?: NO
6. HAVE YOU EVER DONE ANYTHING, STARTED TO DO ANYTHING, OR PREPARED TO DO ANYTHING TO END YOUR LIFE?: NO
2. HAVE YOU ACTUALLY HAD ANY THOUGHTS OF KILLING YOURSELF?: NO
1. IN THE PAST MONTH, HAVE YOU WISHED YOU WERE DEAD OR WISHED YOU COULD GO TO SLEEP AND NOT WAKE UP?: NO
2. HAVE YOU ACTUALLY HAD ANY THOUGHTS OF KILLING YOURSELF?: NO
6. HAVE YOU EVER DONE ANYTHING, STARTED TO DO ANYTHING, OR PREPARED TO DO ANYTHING TO END YOUR LIFE?: NO
1. IN THE PAST MONTH, HAVE YOU WISHED YOU WERE DEAD OR WISHED YOU COULD GO TO SLEEP AND NOT WAKE UP?: NO
6. HAVE YOU EVER DONE ANYTHING, STARTED TO DO ANYTHING, OR PREPARED TO DO ANYTHING TO END YOUR LIFE?: NO
1. IN THE PAST MONTH, HAVE YOU WISHED YOU WERE DEAD OR WISHED YOU COULD GO TO SLEEP AND NOT WAKE UP?: NO
6. HAVE YOU EVER DONE ANYTHING, STARTED TO DO ANYTHING, OR PREPARED TO DO ANYTHING TO END YOUR LIFE?: NO
2. HAVE YOU ACTUALLY HAD ANY THOUGHTS OF KILLING YOURSELF?: NO
6. HAVE YOU EVER DONE ANYTHING, STARTED TO DO ANYTHING, OR PREPARED TO DO ANYTHING TO END YOUR LIFE?: NO
1. IN THE PAST MONTH, HAVE YOU WISHED YOU WERE DEAD OR WISHED YOU COULD GO TO SLEEP AND NOT WAKE UP?: NO
6. HAVE YOU EVER DONE ANYTHING, STARTED TO DO ANYTHING, OR PREPARED TO DO ANYTHING TO END YOUR LIFE?: NO
1. IN THE PAST MONTH, HAVE YOU WISHED YOU WERE DEAD OR WISHED YOU COULD GO TO SLEEP AND NOT WAKE UP?: NO
2. HAVE YOU ACTUALLY HAD ANY THOUGHTS OF KILLING YOURSELF?: NO
6. HAVE YOU EVER DONE ANYTHING, STARTED TO DO ANYTHING, OR PREPARED TO DO ANYTHING TO END YOUR LIFE?: NO
1. IN THE PAST MONTH, HAVE YOU WISHED YOU WERE DEAD OR WISHED YOU COULD GO TO SLEEP AND NOT WAKE UP?: NO
6. HAVE YOU EVER DONE ANYTHING, STARTED TO DO ANYTHING, OR PREPARED TO DO ANYTHING TO END YOUR LIFE?: NO
1. IN THE PAST MONTH, HAVE YOU WISHED YOU WERE DEAD OR WISHED YOU COULD GO TO SLEEP AND NOT WAKE UP?: NO
1. IN THE PAST MONTH, HAVE YOU WISHED YOU WERE DEAD OR WISHED YOU COULD GO TO SLEEP AND NOT WAKE UP?: NO
2. HAVE YOU ACTUALLY HAD ANY THOUGHTS OF KILLING YOURSELF?: NO
6. HAVE YOU EVER DONE ANYTHING, STARTED TO DO ANYTHING, OR PREPARED TO DO ANYTHING TO END YOUR LIFE?: NO
2. HAVE YOU ACTUALLY HAD ANY THOUGHTS OF KILLING YOURSELF?: NO
1. IN THE PAST MONTH, HAVE YOU WISHED YOU WERE DEAD OR WISHED YOU COULD GO TO SLEEP AND NOT WAKE UP?: NO
6. HAVE YOU EVER DONE ANYTHING, STARTED TO DO ANYTHING, OR PREPARED TO DO ANYTHING TO END YOUR LIFE?: NO
2. HAVE YOU ACTUALLY HAD ANY THOUGHTS OF KILLING YOURSELF?: NO
1. IN THE PAST MONTH, HAVE YOU WISHED YOU WERE DEAD OR WISHED YOU COULD GO TO SLEEP AND NOT WAKE UP?: NO
1. IN THE PAST MONTH, HAVE YOU WISHED YOU WERE DEAD OR WISHED YOU COULD GO TO SLEEP AND NOT WAKE UP?: NO
2. HAVE YOU ACTUALLY HAD ANY THOUGHTS OF KILLING YOURSELF?: NO
6. HAVE YOU EVER DONE ANYTHING, STARTED TO DO ANYTHING, OR PREPARED TO DO ANYTHING TO END YOUR LIFE?: NO
1. IN THE PAST MONTH, HAVE YOU WISHED YOU WERE DEAD OR WISHED YOU COULD GO TO SLEEP AND NOT WAKE UP?: NO
1. IN THE PAST MONTH, HAVE YOU WISHED YOU WERE DEAD OR WISHED YOU COULD GO TO SLEEP AND NOT WAKE UP?: NO
6. HAVE YOU EVER DONE ANYTHING, STARTED TO DO ANYTHING, OR PREPARED TO DO ANYTHING TO END YOUR LIFE?: NO
6. HAVE YOU EVER DONE ANYTHING, STARTED TO DO ANYTHING, OR PREPARED TO DO ANYTHING TO END YOUR LIFE?: NO
1. IN THE PAST MONTH, HAVE YOU WISHED YOU WERE DEAD OR WISHED YOU COULD GO TO SLEEP AND NOT WAKE UP?: NO
6. HAVE YOU EVER DONE ANYTHING, STARTED TO DO ANYTHING, OR PREPARED TO DO ANYTHING TO END YOUR LIFE?: NO
6. HAVE YOU EVER DONE ANYTHING, STARTED TO DO ANYTHING, OR PREPARED TO DO ANYTHING TO END YOUR LIFE?: NO
1. IN THE PAST MONTH, HAVE YOU WISHED YOU WERE DEAD OR WISHED YOU COULD GO TO SLEEP AND NOT WAKE UP?: NO
2. HAVE YOU ACTUALLY HAD ANY THOUGHTS OF KILLING YOURSELF?: NO

## 2023-01-01 ASSESSMENT — PAIN SCALES - GENERAL
PAINLEVEL_OUTOF10: 0 - NO PAIN
PAINLEVEL_OUTOF10: 3
PAINLEVEL_OUTOF10: 3
PAINLEVEL_OUTOF10: 0 - NO PAIN
PAINLEVEL_OUTOF10: 2
PAINLEVEL_OUTOF10: 5 - MODERATE PAIN
PAINLEVEL_OUTOF10: 0 - NO PAIN
PAINLEVEL_OUTOF10: 7
PAINLEVEL_OUTOF10: 0 - NO PAIN
PAINLEVEL: 0-NO PAIN
PAINLEVEL: 0-NO PAIN
PAINLEVEL_OUTOF10: 7
PAINLEVEL_OUTOF10: 0 - NO PAIN
PAINLEVEL_OUTOF10: 5 - MODERATE PAIN
PAIN_LEVEL: 2
PAINLEVEL_OUTOF10: 0 - NO PAIN
PAINLEVEL: 0-NO PAIN
PAINLEVEL_OUTOF10: 6
PAINLEVEL_OUTOF10: 0 - NO PAIN
PAINLEVEL_OUTOF10: 6
PAINLEVEL_OUTOF10: 0 - NO PAIN
PAINLEVEL_OUTOF10: 5 - MODERATE PAIN
PAINLEVEL_OUTOF10: 0 - NO PAIN
PAINLEVEL: 0-NO PAIN
PAINLEVEL_OUTOF10: 0 - NO PAIN
PAINLEVEL_OUTOF10: 2
PAINLEVEL_OUTOF10: 5 - MODERATE PAIN
PAINLEVEL: 0-NO PAIN
PAIN_LEVEL: 0
PAINLEVEL: 0-NO PAIN
PAINLEVEL_OUTOF10: 0 - NO PAIN
PAINLEVEL_OUTOF10: 1
PAINLEVEL_OUTOF10: 0 - NO PAIN
PAINLEVEL_OUTOF10: 0 - NO PAIN
PAINLEVEL_OUTOF10: 2
PAINLEVEL_OUTOF10: 0 - NO PAIN
PAINLEVEL_OUTOF10: 0 - NO PAIN

## 2023-01-01 ASSESSMENT — LIFESTYLE VARIABLES
AUDIT-C TOTAL SCORE: 2
HAVE YOU EVER FELT YOU SHOULD CUT DOWN ON YOUR DRINKING: NO
EVER HAD A DRINK FIRST THING IN THE MORNING TO STEADY YOUR NERVES TO GET RID OF A HANGOVER: NO
SKIP TO QUESTIONS 9-10: 1
AUDIT-C TOTAL SCORE: 2
HAVE YOU EVER FELT YOU SHOULD CUT DOWN ON YOUR DRINKING: NO
AUDIT-C TOTAL SCORE: 0
HOW OFTEN DO YOU HAVE 6 OR MORE DRINKS ON ONE OCCASION: NEVER
HOW OFTEN DO YOU HAVE A DRINK CONTAINING ALCOHOL: NEVER
SKIP TO QUESTIONS 9-10: 0
PRESCIPTION_ABUSE_PAST_12_MONTHS: NO
AUDIT-C TOTAL SCORE: 0
HOW MANY STANDARD DRINKS CONTAINING ALCOHOL DO YOU HAVE ON A TYPICAL DAY: PATIENT DOES NOT DRINK
SUBSTANCE_ABUSE_PAST_12_MONTHS: NO
HAVE PEOPLE ANNOYED YOU BY CRITICIZING YOUR DRINKING: NO
EVER FELT BAD OR GUILTY ABOUT YOUR DRINKING: NO
HOW MANY STANDARD DRINKS CONTAINING ALCOHOL DO YOU HAVE ON A TYPICAL DAY: 1 OR 2
SKIP TO QUESTIONS 9-10: 1
HOW OFTEN DO YOU HAVE 6 OR MORE DRINKS ON ONE OCCASION: NEVER
HAVE PEOPLE ANNOYED YOU BY CRITICIZING YOUR DRINKING: NO
EVER FELT BAD OR GUILTY ABOUT YOUR DRINKING: NO
EVER HAD A DRINK FIRST THING IN THE MORNING TO STEADY YOUR NERVES TO GET RID OF A HANGOVER: NO
PRESCIPTION_ABUSE_PAST_12_MONTHS: NO
AUDIT-C TOTAL SCORE: 0
PRESCIPTION_ABUSE_PAST_12_MONTHS: NO
HOW MANY STANDARD DRINKS CONTAINING ALCOHOL DO YOU HAVE ON A TYPICAL DAY: PATIENT DOES NOT DRINK
HOW OFTEN DO YOU HAVE A DRINK CONTAINING ALCOHOL: NEVER
SKIP TO QUESTIONS 9-10: 1
HOW OFTEN DO YOU HAVE 6 OR MORE DRINKS ON ONE OCCASION: NEVER
HOW OFTEN DO YOU HAVE A DRINK CONTAINING ALCOHOL: MONTHLY OR LESS
HOW MANY STANDARD DRINKS CONTAINING ALCOHOL DO YOU HAVE ON A TYPICAL DAY: PATIENT DOES NOT DRINK
SUBSTANCE_ABUSE_PAST_12_MONTHS: NO
AUDIT-C TOTAL SCORE: 0
REASON UNABLE TO ASSESS: NO
AUDIT-C TOTAL SCORE: 0
AUDIT-C TOTAL SCORE: 0
HOW OFTEN DO YOU HAVE 6 OR MORE DRINKS ON ONE OCCASION: LESS THAN MONTHLY
SUBSTANCE_ABUSE_PAST_12_MONTHS: NO
HOW OFTEN DO YOU HAVE A DRINK CONTAINING ALCOHOL: NEVER

## 2023-01-01 ASSESSMENT — ACTIVITIES OF DAILY LIVING (ADL)
FEEDING YOURSELF: INDEPENDENT
HEARING - RIGHT EAR: FUNCTIONAL
JUDGMENT_ADEQUATE_SAFELY_COMPLETE_DAILY_ACTIVITIES: YES
ADEQUATE_TO_COMPLETE_ADL: YES
HEARING - RIGHT EAR: FUNCTIONAL
PATIENT'S MEMORY ADEQUATE TO SAFELY COMPLETE DAILY ACTIVITIES?: YES
PATIENT'S MEMORY ADEQUATE TO SAFELY COMPLETE DAILY ACTIVITIES?: YES
BATHING: INDEPENDENT
TOILETING: INDEPENDENT
GROOMING: INDEPENDENT
BATHING: INDEPENDENT
FEEDING YOURSELF: INDEPENDENT
DRESSING YOURSELF: INDEPENDENT
LACK_OF_TRANSPORTATION: NO
HEARING - LEFT EAR: FUNCTIONAL
PATIENT'S MEMORY ADEQUATE TO SAFELY COMPLETE DAILY ACTIVITIES?: YES
LACK_OF_TRANSPORTATION: NO
HEARING - RIGHT EAR: FUNCTIONAL
TOILETING: INDEPENDENT
ADEQUATE_TO_COMPLETE_ADL: YES
GROOMING: INDEPENDENT
WALKS IN HOME: INDEPENDENT
DRESSING YOURSELF: INDEPENDENT
JUDGMENT_ADEQUATE_SAFELY_COMPLETE_DAILY_ACTIVITIES: YES
GROOMING: INDEPENDENT
HEARING - LEFT EAR: FUNCTIONAL
LACK_OF_TRANSPORTATION: NO
WALKS IN HOME: NEEDS ASSISTANCE
HEARING - LEFT EAR: FUNCTIONAL
FEEDING YOURSELF: INDEPENDENT
ADEQUATE_TO_COMPLETE_ADL: YES
TOILETING: INDEPENDENT
JUDGMENT_ADEQUATE_SAFELY_COMPLETE_DAILY_ACTIVITIES: YES
WALKS IN HOME: INDEPENDENT
LACK_OF_TRANSPORTATION: NO
BATHING: INDEPENDENT
DRESSING YOURSELF: INDEPENDENT
ASSISTIVE_DEVICE: WALKER

## 2023-01-01 ASSESSMENT — PATIENT HEALTH QUESTIONNAIRE - PHQ9
2. FEELING DOWN, DEPRESSED OR HOPELESS: NOT AT ALL
1. LITTLE INTEREST OR PLEASURE IN DOING THINGS: NOT AT ALL
2. FEELING DOWN, DEPRESSED OR HOPELESS: NOT AT ALL
1. LITTLE INTEREST OR PLEASURE IN DOING THINGS: NOT AT ALL
SUM OF ALL RESPONSES TO PHQ9 QUESTIONS 1 AND 2: 0
2. FEELING DOWN, DEPRESSED OR HOPELESS: NOT AT ALL
1. LITTLE INTEREST OR PLEASURE IN DOING THINGS: NOT AT ALL
1. LITTLE INTEREST OR PLEASURE IN DOING THINGS: NOT AT ALL
2. FEELING DOWN, DEPRESSED OR HOPELESS: NOT AT ALL
SUM OF ALL RESPONSES TO PHQ9 QUESTIONS 1 AND 2: 0
1. LITTLE INTEREST OR PLEASURE IN DOING THINGS: NOT AT ALL
SUM OF ALL RESPONSES TO PHQ9 QUESTIONS 1 AND 2: 0
SUM OF ALL RESPONSES TO PHQ9 QUESTIONS 1 AND 2: 0
1. LITTLE INTEREST OR PLEASURE IN DOING THINGS: NOT AT ALL
SUM OF ALL RESPONSES TO PHQ9 QUESTIONS 1 & 2: 0
1. LITTLE INTEREST OR PLEASURE IN DOING THINGS: NOT AT ALL
1. LITTLE INTEREST OR PLEASURE IN DOING THINGS: NOT AT ALL
SUM OF ALL RESPONSES TO PHQ9 QUESTIONS 1 AND 2: 0
SUM OF ALL RESPONSES TO PHQ9 QUESTIONS 1 AND 2: 0
2. FEELING DOWN, DEPRESSED OR HOPELESS: NOT AT ALL
SUM OF ALL RESPONSES TO PHQ9 QUESTIONS 1 & 2: 0
SUM OF ALL RESPONSES TO PHQ9 QUESTIONS 1 & 2: 0
2. FEELING DOWN, DEPRESSED OR HOPELESS: NOT AT ALL
1. LITTLE INTEREST OR PLEASURE IN DOING THINGS: NOT AT ALL
2. FEELING DOWN, DEPRESSED OR HOPELESS: NOT AT ALL
1. LITTLE INTEREST OR PLEASURE IN DOING THINGS: NOT AT ALL
SUM OF ALL RESPONSES TO PHQ9 QUESTIONS 1 AND 2: 0
2. FEELING DOWN, DEPRESSED OR HOPELESS: NOT AT ALL
2. FEELING DOWN, DEPRESSED OR HOPELESS: NOT AT ALL
1. LITTLE INTEREST OR PLEASURE IN DOING THINGS: NOT AT ALL
2. FEELING DOWN, DEPRESSED OR HOPELESS: NOT AT ALL
SUM OF ALL RESPONSES TO PHQ9 QUESTIONS 1 AND 2: 0
SUM OF ALL RESPONSES TO PHQ9 QUESTIONS 1 AND 2: 0
1. LITTLE INTEREST OR PLEASURE IN DOING THINGS: NOT AT ALL
SUM OF ALL RESPONSES TO PHQ9 QUESTIONS 1 AND 2: 0
1. LITTLE INTEREST OR PLEASURE IN DOING THINGS: NOT AT ALL
1. LITTLE INTEREST OR PLEASURE IN DOING THINGS: NOT AT ALL
SUM OF ALL RESPONSES TO PHQ9 QUESTIONS 1 AND 2: 0
2. FEELING DOWN, DEPRESSED OR HOPELESS: NOT AT ALL
2. FEELING DOWN, DEPRESSED OR HOPELESS: NOT AT ALL
SUM OF ALL RESPONSES TO PHQ9 QUESTIONS 1 AND 2: 0
SUM OF ALL RESPONSES TO PHQ9 QUESTIONS 1 AND 2: 0
2. FEELING DOWN, DEPRESSED OR HOPELESS: NOT AT ALL
1. LITTLE INTEREST OR PLEASURE IN DOING THINGS: NOT AT ALL
1. LITTLE INTEREST OR PLEASURE IN DOING THINGS: NOT AT ALL
2. FEELING DOWN, DEPRESSED OR HOPELESS: NOT AT ALL
1. LITTLE INTEREST OR PLEASURE IN DOING THINGS: NOT AT ALL
SUM OF ALL RESPONSES TO PHQ9 QUESTIONS 1 & 2: 0
2. FEELING DOWN, DEPRESSED OR HOPELESS: NOT AT ALL
1. LITTLE INTEREST OR PLEASURE IN DOING THINGS: NOT AT ALL
SUM OF ALL RESPONSES TO PHQ9 QUESTIONS 1 AND 2: 0
2. FEELING DOWN, DEPRESSED OR HOPELESS: NOT AT ALL

## 2023-01-01 ASSESSMENT — PAIN DESCRIPTION - ORIENTATION: ORIENTATION: RIGHT

## 2023-01-01 ASSESSMENT — PAIN DESCRIPTION - LOCATION
LOCATION: LEG
LOCATION: HEAD

## 2023-01-01 ASSESSMENT — PAIN DESCRIPTION - DESCRIPTORS: DESCRIPTORS: ACHING

## 2023-08-21 PROBLEM — I44.1 MOBITZ TYPE I WENCKEBACH ATRIOVENTRICULAR BLOCK: Status: ACTIVE | Noted: 2023-01-01

## 2023-08-21 PROBLEM — H81.10 BENIGN PAROXYSMAL POSITIONAL VERTIGO: Status: ACTIVE | Noted: 2022-03-15

## 2023-08-21 PROBLEM — I49.8 VENTRICULAR TRIGEMINY: Status: ACTIVE | Noted: 2023-01-01

## 2023-08-21 PROBLEM — R26.81 UNSTEADINESS ON FEET: Status: ACTIVE | Noted: 2023-01-01

## 2023-08-21 PROBLEM — N40.0 ENLARGED PROSTATE ON RECTAL EXAMINATION: Status: ACTIVE | Noted: 2023-01-01

## 2023-08-21 PROBLEM — G47.00 INSOMNIA: Status: ACTIVE | Noted: 2023-01-01

## 2023-08-21 PROBLEM — I47.20 VENTRICULAR TACHYCARDIA (MULTI): Status: ACTIVE | Noted: 2023-01-01

## 2023-08-21 PROBLEM — N40.1 BPH WITH OBSTRUCTION/LOWER URINARY TRACT SYMPTOMS: Status: ACTIVE | Noted: 2023-01-01

## 2023-08-21 PROBLEM — M10.9 GOUT OF RIGHT FOOT: Status: ACTIVE | Noted: 2023-01-01

## 2023-08-21 PROBLEM — R35.1 NOCTURIA: Status: ACTIVE | Noted: 2023-01-01

## 2023-08-21 PROBLEM — Z93.3: Status: ACTIVE | Noted: 2023-01-01

## 2023-08-21 PROBLEM — I49.3 FREQUENT PVCS: Status: ACTIVE | Noted: 2023-01-01

## 2023-08-21 PROBLEM — R26.89 UNSTABLE BALANCE: Status: ACTIVE | Noted: 2023-01-01

## 2023-08-21 PROBLEM — L98.9 BENIGN SKIN LESION OF NECK: Status: ACTIVE | Noted: 2023-01-01

## 2023-08-21 PROBLEM — R30.0 BURNING WITH URINATION: Status: ACTIVE | Noted: 2023-01-01

## 2023-08-21 PROBLEM — S61.239A PUNCTURE WOUND OF FINGER: Status: ACTIVE | Noted: 2023-01-01

## 2023-08-21 PROBLEM — K20.90 ESOPHAGITIS: Status: ACTIVE | Noted: 2023-01-01

## 2023-08-21 PROBLEM — M51.27 HERNIATED NUCLEUS PULPOSUS OF LUMBOSACRAL REGION: Status: ACTIVE | Noted: 2023-01-01

## 2023-08-21 PROBLEM — M89.9 LESION OF BONE OF THORACIC SPINE: Status: ACTIVE | Noted: 2023-01-01

## 2023-08-21 PROBLEM — Z93.3 COLOSTOMY IN PLACE (MULTI): Status: ACTIVE | Noted: 2023-01-01

## 2023-08-21 PROBLEM — K52.9 COLITIS: Status: ACTIVE | Noted: 2023-01-01

## 2023-08-21 PROBLEM — D12.8 RECTAL ADENOMA: Status: ACTIVE | Noted: 2023-01-01

## 2023-08-21 PROBLEM — R94.39 ABNORMAL STRESS TEST: Status: ACTIVE | Noted: 2023-01-01

## 2023-08-21 PROBLEM — J44.9 COPD (CHRONIC OBSTRUCTIVE PULMONARY DISEASE) (MULTI): Status: ACTIVE | Noted: 2023-01-01

## 2023-08-21 PROBLEM — H61.23 BILATERAL IMPACTED CERUMEN: Status: ACTIVE | Noted: 2021-12-23

## 2023-08-21 PROBLEM — R79.89 ABNORMAL THYROID SCREEN (BLOOD): Status: ACTIVE | Noted: 2023-01-01

## 2023-08-21 PROBLEM — M54.16 LUMBAR RADICULITIS: Status: ACTIVE | Noted: 2023-01-01

## 2023-08-21 PROBLEM — Z95.0 PRESENCE OF PERMANENT CARDIAC PACEMAKER: Status: ACTIVE | Noted: 2023-01-01

## 2023-08-21 PROBLEM — R53.81 MALAISE AND FATIGUE: Status: ACTIVE | Noted: 2023-01-01

## 2023-08-21 PROBLEM — R19.7 DIARRHEA: Status: ACTIVE | Noted: 2023-01-01

## 2023-08-21 PROBLEM — R42 VERTIGO: Status: ACTIVE | Noted: 2023-01-01

## 2023-08-21 PROBLEM — C20 RECTAL CANCER (MULTI): Status: ACTIVE | Noted: 2023-01-01

## 2023-08-21 PROBLEM — E78.5 HYPERLIPIDEMIA: Status: ACTIVE | Noted: 2023-01-01

## 2023-08-21 PROBLEM — E85.4 CARDIAC AMYLOIDOSIS (MULTI): Status: ACTIVE | Noted: 2023-01-01

## 2023-08-21 PROBLEM — I95.0 IDIOPATHIC HYPOTENSION: Status: ACTIVE | Noted: 2023-01-01

## 2023-08-21 PROBLEM — I43 CARDIAC AMYLOIDOSIS (MULTI): Status: ACTIVE | Noted: 2023-01-01

## 2023-08-21 PROBLEM — I48.0 PAROXYSMAL ATRIAL FIBRILLATION (MULTI): Status: ACTIVE | Noted: 2023-01-01

## 2023-08-21 PROBLEM — R94.8 ABNORMAL POSITRON EMISSION TOMOGRAPHY (PET) SCAN: Status: ACTIVE | Noted: 2023-01-01

## 2023-08-21 PROBLEM — K43.5 PARASTOMAL HERNIA: Status: ACTIVE | Noted: 2023-01-01

## 2023-08-21 PROBLEM — R42 LIGHTHEADEDNESS: Status: ACTIVE | Noted: 2023-01-01

## 2023-08-21 PROBLEM — R18.8 PELVIC FLUID COLLECTION: Status: ACTIVE | Noted: 2023-01-01

## 2023-08-21 PROBLEM — R35.0 URINARY FREQUENCY: Status: ACTIVE | Noted: 2023-01-01

## 2023-08-21 PROBLEM — N28.1 RENAL CYSTS, ACQUIRED, BILATERAL: Status: ACTIVE | Noted: 2023-01-01

## 2023-08-21 PROBLEM — M25.552 LEFT HIP PAIN: Status: ACTIVE | Noted: 2023-01-01

## 2023-08-21 PROBLEM — R06.02 SHORTNESS OF BREATH: Status: ACTIVE | Noted: 2023-01-01

## 2023-08-21 PROBLEM — E61.1 IRON DEFICIENCY: Status: ACTIVE | Noted: 2023-01-01

## 2023-08-21 PROBLEM — C18.9: Status: ACTIVE | Noted: 2023-01-01

## 2023-08-21 PROBLEM — I50.22 CHRONIC SYSTOLIC HEART FAILURE (MULTI): Status: ACTIVE | Noted: 2023-01-01

## 2023-08-21 PROBLEM — R94.31 ABNORMAL EKG: Status: ACTIVE | Noted: 2023-01-01

## 2023-08-21 PROBLEM — N13.8 BPH WITH OBSTRUCTION/LOWER URINARY TRACT SYMPTOMS: Status: ACTIVE | Noted: 2023-01-01

## 2023-08-21 PROBLEM — M54.9 BACK PAIN: Status: ACTIVE | Noted: 2023-01-01

## 2023-08-21 PROBLEM — R25.2 MUSCLE CRAMPS: Status: ACTIVE | Noted: 2023-01-01

## 2023-08-21 PROBLEM — H90.3 SENSORINEURAL HEARING LOSS, BILATERAL: Status: ACTIVE | Noted: 2022-03-15
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
## 2023-08-21 PROBLEM — R91.8 PULMONARY NODULES/LESIONS, MULTIPLE: Status: ACTIVE | Noted: 2023-01-01

## 2023-08-21 PROBLEM — S70.12XA HEMATOMA OF LEFT THIGH: Status: ACTIVE | Noted: 2023-01-01

## 2023-08-21 PROBLEM — G47.30 SLEEP-DISORDERED BREATHING: Status: ACTIVE | Noted: 2023-01-01

## 2023-08-21 PROBLEM — I50.32 CHRONIC DIASTOLIC CONGESTIVE HEART FAILURE (MULTI): Status: ACTIVE | Noted: 2023-01-01

## 2023-08-21 PROBLEM — R77.1 ABNORMAL GAMMA GLOBULIN LEVEL: Status: ACTIVE | Noted: 2023-01-01

## 2023-08-21 PROBLEM — E85.89: Status: ACTIVE | Noted: 2023-01-01

## 2023-08-21 PROBLEM — K52.9 CHRONIC DIARRHEA: Status: ACTIVE | Noted: 2023-01-01

## 2023-08-21 PROBLEM — I48.92 ATRIAL FLUTTER (MULTI): Status: ACTIVE | Noted: 2023-01-01

## 2023-08-21 PROBLEM — R00.1 BRADYCARDIA: Status: ACTIVE | Noted: 2023-01-01

## 2023-08-21 PROBLEM — D64.9 ANEMIA: Status: ACTIVE | Noted: 2023-01-01

## 2023-08-21 PROBLEM — R53.83 MALAISE AND FATIGUE: Status: ACTIVE | Noted: 2023-01-01

## 2023-08-21 PROBLEM — R42 POSTURAL DIZZINESS: Status: ACTIVE | Noted: 2021-12-23

## 2023-08-21 PROBLEM — M54.30 SCIATICA: Status: ACTIVE | Noted: 2023-01-01

## 2023-08-21 PROBLEM — R31.9 HEMATURIA: Status: ACTIVE | Noted: 2023-01-01

## 2023-08-21 PROBLEM — K51.019 ULCERATIVE PANCOLITIS WITH COMPLICATION (MULTI): Status: ACTIVE | Noted: 2023-01-01

## 2023-08-21 PROBLEM — M54.50 LOW BACK PAIN: Status: ACTIVE | Noted: 2023-01-01

## 2023-08-21 PROBLEM — R09.89: Status: ACTIVE | Noted: 2023-01-01

## 2023-08-21 PROBLEM — M25.512 LEFT SHOULDER PAIN: Status: ACTIVE | Noted: 2023-01-01

## 2023-08-21 PROBLEM — Z98.61 POST PTCA: Status: ACTIVE | Noted: 2023-01-01

## 2023-08-21 PROBLEM — R94.31: Status: ACTIVE | Noted: 2023-01-01

## 2023-08-21 PROBLEM — R20.8 DECREASED VIBRATORY SENSE: Status: ACTIVE | Noted: 2023-01-01

## 2023-08-21 PROBLEM — R10.2 MALE PERINEAL PAIN: Status: ACTIVE | Noted: 2023-01-01

## 2023-08-21 PROBLEM — I25.10 ATHEROSCLEROTIC HEART DISEASE OF NATIVE CORONARY ARTERY WITHOUT ANGINA PECTORIS: Status: ACTIVE | Noted: 2023-01-01

## 2023-09-19 PROBLEM — R60.9 EDEMA: Status: ACTIVE | Noted: 2023-01-01

## 2023-09-19 PROBLEM — R26.81 GAIT INSTABILITY: Status: ACTIVE | Noted: 2023-01-01

## 2023-09-19 PROBLEM — E87.6 HYPOKALEMIA: Status: ACTIVE | Noted: 2023-01-01

## 2023-09-19 PROBLEM — Z78.9 KNOWN MEDICAL PROBLEMS: Status: ACTIVE | Noted: 2023-01-01

## 2023-09-19 PROBLEM — G47.61 PERIODIC LIMB MOVEMENTS OF SLEEP: Status: ACTIVE | Noted: 2023-01-01

## 2023-09-19 PROBLEM — G47.33 OBSTRUCTIVE SLEEP APNEA: Status: ACTIVE | Noted: 2023-01-01

## 2023-09-19 PROBLEM — M71.22 SYNOVIAL CYST OF LEFT KNEE: Status: ACTIVE | Noted: 2023-01-01

## 2023-09-19 PROBLEM — R60.1 GENERALIZED EDEMA: Status: ACTIVE | Noted: 2023-01-01

## 2023-09-19 PROBLEM — I73.9 PERIPHERAL VASCULAR DISEASE OF EXTREMITY (CMS-HCC): Status: ACTIVE | Noted: 2023-01-01

## 2023-09-19 PROBLEM — R09.89 DECREASED RADIAL PULSE: Status: ACTIVE | Noted: 2023-01-01

## 2023-09-19 PROBLEM — I86.4 GASTRIC VARICES: Status: ACTIVE | Noted: 2023-01-01

## 2023-09-19 PROBLEM — I25.10 CAD (CORONARY ARTERY DISEASE): Status: ACTIVE | Noted: 2023-01-01

## 2023-09-19 PROBLEM — Z79.899 HIGH RISK MEDICATIONS (NOT ANTICOAGULANTS) LONG-TERM USE: Status: ACTIVE | Noted: 2023-01-01

## 2023-09-19 PROBLEM — I73.9 PERIPHERAL VASCULAR DISEASE (CMS-HCC): Status: ACTIVE | Noted: 2023-01-01

## 2023-09-19 PROBLEM — N18.30 STAGE 3 CHRONIC KIDNEY DISEASE (MULTI): Status: ACTIVE | Noted: 2023-01-01

## 2023-10-05 PROBLEM — I50.43 ACUTE ON CHRONIC COMBINED SYSTOLIC AND DIASTOLIC CHF (CONGESTIVE HEART FAILURE) (MULTI): Status: ACTIVE | Noted: 2023-01-01

## 2023-10-05 NOTE — ED PROVIDER NOTES
HPI   Chief Complaint   Patient presents with    Weakness, Gen     Recently dc from [facility] on Tuesday. Reports he had fluid around his heart. Reports  he has been feeling worse since being home and has gained 6lbs    Head Injury     Hit head on kitchen cabinet last night, on eliquis. Abrasion to top of head. HIPILAR called at [phone]    Anxiety       This is an 83-year-old male who gets his care at [facility] with a history of coronary artery disease status post CABG, heart failure with preserved EF 45%, atrial fibrillation on Eliquis prior colon cancer status post colostomy who presents to the emergency department for concerns of fluid overload.  Patient states that he was discharged [facility] about a week ago and was found to have fluid around his heart.  He states that he has been feeling weak since discharge gained about 6 pounds over a week.  Denies any chest pain or shortness of breath.  No worsening swelling of his legs.  No fevers or coughs.  No other issues or concerns.  He does state that he has these episodes of shaking which she attributes to his nerves.  He also reports that he did hit his head 2 days ago on his cabinet denies any loss of consciousness.                          Tita Coma Scale Score: 15                  Patient History   Past Medical History:   Diagnosis Date    Atherosclerosis of coronary artery bypass graft(s) without angina pectoris 07/15/2016    Atherosclerosis of coronary artery bypass graft(s) without angina pectoris    Atherosclerosis of coronary artery bypass graft(s), unspecified, with other forms of angina pectoris (CMS/[facility]) 04/22/2019    Coronary artery disease of bypass graft of native heart with stable angina pectoris    Benign prostatic hyperplasia without lower urinary tract symptoms     BPH (benign prostatic hyperplasia)    Other chest pain 04/01/2016    Chest discomfort    Other chest pain 09/17/2019    Atypical chest pain    Other chest pain 05/11/2018     Atypical chest pain    Personal history of other diseases of the circulatory system 04/01/2016    History of angina pectoris    Personal history of other diseases of the digestive system     History of ulcerative colitis    Personal history of other diseases of the musculoskeletal system and connective tissue     History of osteoarthritis    Personal history of other diseases of the musculoskeletal system and connective tissue 12/07/2018    History of chronic back pain    Personal history of other diseases of urinary system 06/21/2019    History of hematuria    Personal history of other specified conditions 04/01/2016    History of chest pain    Personal history of other specified conditions 07/15/2016    History of precordial chest pain     Past Surgical History:   Procedure Laterality Date    ADENOIDECTOMY  04/01/2016    Adenoidectomy    CATARACT EXTRACTION  04/01/2016    Cataract Surgery    CORONARY ARTERY BYPASS GRAFT  04/01/2016    CABG    CT GUIDED PERCUTANEOUS PERITONEAL OR RETROPERITONEAL FLUID COLLECTION DRAINAGE  10/8/2018    CT GUIDED PERCUTANEOUS PERITONEAL OR RETROPERITONEAL FLUID COLLECTION DRAINAGE 10/8/2018 [facility] CLINICAL LEGACY    ILEOSTOMY  11/19/2022    Ileostomy    OTHER SURGICAL HISTORY  04/01/2016    Cath Atherectomy 3 Left Internal Mammary Graft    OTHER SURGICAL HISTORY  12/09/2021    Pacemaker insertion    OTHER SURGICAL HISTORY  12/09/2021    Cardioversion    OTHER SURGICAL HISTORY  10/23/2018    Total Abdominal Colectomy    OTHER SURGICAL HISTORY  10/22/2018    Complete Proctectomy Combined APR W/ Colostomy Laparoscopic    ROTATOR CUFF REPAIR  04/01/2016    Rotator Cuff Repair    TONSILLECTOMY  04/01/2016    Tonsillectomy    TOTAL KNEE ARTHROPLASTY  04/01/2016    Knee Replacement    US GUIDED ABSCESS DRAIN  10/1/2018    US GUIDED ABSCESS DRAIN 10/1/2018 [facility] CLINICAL LEGACY    US GUIDED PERCUTANEOUS PERITONEAL OR RETROPERITONEAL FLUID COLLECTION DRAINAGE  10/1/2018    US GUIDED PERCUTANEOUS  PERITONEAL OR RETROPERITONEAL FLUID COLLECTION DRAINAGE 10/1/2018 [facility] CLINICAL LEGACY     Family History   Problem Relation Name Age of Onset    Other (Cardiac disorder) Other Sibling      Social History     Tobacco Use    Smoking status: Not on file    Smokeless tobacco: Not on file   Substance Use Topics    Alcohol use: Not on file    Drug use: Not on file       Physical Exam   ED Triage Vitals [10/05/23 0911]   Temp Heart Rate Resp BP   35.9 °C (96.7 °F) 71 18 116/59      SpO2 Temp Source Heart Rate Source Patient Position   97 % Temporal Monitor --      BP Location FiO2 (%)     -- --       Physical Exam  Constitutional:       General: He is not in acute distress.  HENT:      Head: Normocephalic and atraumatic.      Right Ear: Tympanic membrane normal.      Left Ear: Tympanic membrane normal.      Mouth/Throat:      Mouth: Mucous membranes are moist.   Eyes:      Extraocular Movements: Extraocular movements intact.      Conjunctiva/sclera: Conjunctivae normal.      Pupils: Pupils are equal, round, and reactive to light.   Cardiovascular:      Rate and Rhythm: Normal rate and regular rhythm.      Heart sounds: No murmur heard.  Pulmonary:      Effort: Pulmonary effort is normal. No respiratory distress.      Breath sounds: Normal breath sounds. No stridor. No wheezing or rales.   Abdominal:      General: Bowel sounds are normal. There is no distension.      Tenderness: There is no abdominal tenderness. There is no guarding or rebound.   Musculoskeletal:         General: No swelling, tenderness or deformity. Normal range of motion.   Skin:     General: Skin is warm and dry.      Coloration: Skin is not jaundiced.      Findings: No bruising or lesion.      Comments: 1+ pitting edema bilateral lower extremities   Neurological:      General: No focal deficit present.      Mental Status: He is alert and oriented to person, place, and time. Mental status is at baseline.      Cranial Nerves: No cranial nerve deficit.       Motor: No weakness.   Psychiatric:         Mood and Affect: Mood normal.       Labs Reviewed - No data to display  No orders to display       ED Course & MDM   ED Course as of 12/25/23 1223   Thu Oct 05, 2023   0938 AV dual paced rhythm at a rate of 71 bpm, prolonged QTc prolonged QRS similar to prior with a PVC. [CF]   2115 Patient advised hospitalization he is leaving AMA. [MT]      ED Course User Index  [CF] Litzy Echeverria MD  [MT] Indio Appiah MD         Diagnoses as of 12/25/23 1223   Acute on chronic congestive heart failure, unspecified heart failure type (CMS/HCC)   Generalized weakness   Head injury, initial encounter   Anemia, unspecified type   NSTEMI (non-ST elevated myocardial infarction) (CMS/[facility])       Medical Decision Making  83-year-old male presents emerged department for worsening weight gain of 6 pounds in the past week and along with electric shocks through his body.  EKG is at his baseline troponins are elevated but he does appear to always have elevation he is not complaining of chest pain we will continue to trend troponins will not treat patient with heparin for NSTEMI at this time.  Bedside ultrasound showed no signs of tamponade of the patient does have a circumferential pericardial effusion.  Creatinine appears to be at patient's baseline.  Otherwise electrolytes are all within normal limits.  Patient was given 0.5 of Ativan for his shocks.  CT of head shows no intracranial abnormality.  Patient does have bilateral pulmonary infiltrates most likely represent atelectasis on chest x-ray.  His proBNP is elevated at 1300.  Patient was given 1 dose of IV Lasix of 80 mg.  Medicine was consulted for this patient.        Procedure    Performed by: Litzy Echeverria MD  Authorized by: Litzy Echeverria MD    Procedure: Cardiac Ultrasound    Findings:   Views: parasternal long, parasternal short, apical four and subxiphoid  Effusion: The pericardial space was visualized and was  positive for a PERICARDIAL EFFUSION.  Activity: Ventricular contractions were visualized.  LV: LV systolic function was DECREASED.  RV: RV size was NORMAL.    Impression:  Cardiac: The focused cardiac ultrasound exam had ABNORMAL findings as specified.    Comments: Pericardial effusion but no signs of tamponade.  IVC is plump.       Litzy Echeverria MD  10/05/23 154       Litzy Echeverria MD  12/25/23 1226

## 2023-10-05 NOTE — ED PROVIDER NOTES
HPI   Chief Complaint   Patient presents with   • Weakness, Gen     Recently dc from [facility] on Tuesday. Reports he had fluid around his heart. Reports  he has been feeling worse since being home and has gained 6lbs   • Head Injury     Hit head on kitchen cabinet last night, on eliquis. Abrasion to top of head. [facility]IA called at [phone]   • Anxiety       HPI: []  83-year-old male history of hypertension, CAD on Eliquis recently hospitalized at [facility] for what seems to be pericardial effusion today comes with generalized weakness and a mechanical fall hitting head on the kitchen table.  Denies LOC he denies any chest pain pressure heaviness fever chills nausea vomit diarrhea cough congestion syncope or near syncope.    Past history: Hypertension, CAD, CABG  Social: Patient denies current tobacco alcohol drug abuse.  REVIEW OF SYSTEMS:    GENERAL.: No weight loss, fatigue, anorexia, insomnia, fever.  Positive for weakness    EYES: No vision loss, double vision, drainage, eye pain.    ENT: No pharyngitis, dry mouth.    CARDIOPULMONARY: No chest pain, palpitations, syncope, near syncope. No shortness of breath, cough, hemoptysis.    GI: No abdominal pain, change in bowel habits, melena, hematemesis, hematochezia, nausea, vomiting, diarrhea.    : No discharge, dysuria, frequency, urgency, hematuria.    MS: No limb pain, joint pain, joint swelling.    SKIN: No rashes.  Positive scalp abrasion    PSYCH: No depression, anxiety, suicidality, homicidality.    Review of systems is otherwise negative unless stated above or in history of present illness.  Social history, family history, allergies reviewed.  PHYSICAL EXAM:    GENERAL: Vitals noted, no distress. Alert and oriented  x 3. Non-toxic.      EENT: TMs clear. Posterior oropharynx unremarkable. No meningismus. No LAD.     NECK: Supple. Nontender. No midline tenderness.     CARDIAC: Regular, rate, rhythm. No murmurs rubs or gallops.  4 cm JVD    PULMONARY: Lungs clear  bilaterally with good aeration. No wheezes rales or rhonchi. No respiratory distress.  Bibasilar crackles    ABDOMEN: Soft, nonsurgical. Nontender. No peritoneal signs. Normoactive bowel sounds. No pulsatile masses.     EXTREMITIES: Trace bilateral symmetric peripheral edema. Negative Homans bilaterally, no cords.    SKIN: No rash. Intact.     NEURO: No focal neurologic deficits, NIH score of 0. Cranial nerves normal as tested from II through XII.     MEDICAL DECISION MAKING:  EKG on my interpretation shows a normal sinus rhythm normal axis rate mid 80s with no acute ischemic changes.    CBC with a shows anemia troponin elevated chemistries unremarkable CT head negative    Treatment in ED: IV established external cardiac monitor.    ED course: Patient remained stable hemodynamic.    Impression: #1 CHF: #2 anemia, #3 NSTEMI    Plan set MDM: 83-year-old male history of CHF hypertension CAD comes in mechanical fall on Eliquis appears to be in decompensated heart failure is anemic elevated troponin strongly advised hospitalization but he wants to go home he is leaving AGAINST MEDICAL ADVICE.                          Tita Coma Scale Score: 15                  Patient History   Past Medical History:   Diagnosis Date   • Atherosclerosis of coronary artery bypass graft(s) without angina pectoris 07/15/2016    Atherosclerosis of coronary artery bypass graft(s) without angina pectoris   • Atherosclerosis of coronary artery bypass graft(s), unspecified, with other forms of angina pectoris (CMS/[facility]) 04/22/2019    Coronary artery disease of bypass graft of native heart with stable angina pectoris   • Benign prostatic hyperplasia without lower urinary tract symptoms     BPH (benign prostatic hyperplasia)   • Other chest pain 04/01/2016    Chest discomfort   • Other chest pain 09/17/2019    Atypical chest pain   • Other chest pain 05/11/2018    Atypical chest pain   • Personal history of other diseases of the circulatory system  04/01/2016    History of angina pectoris   • Personal history of other diseases of the digestive system     History of ulcerative colitis   • Personal history of other diseases of the musculoskeletal system and connective tissue     History of osteoarthritis   • Personal history of other diseases of the musculoskeletal system and connective tissue 12/07/2018    History of chronic back pain   • Personal history of other diseases of urinary system 06/21/2019    History of hematuria   • Personal history of other specified conditions 04/01/2016    History of chest pain   • Personal history of other specified conditions 07/15/2016    History of precordial chest pain     Past Surgical History:   Procedure Laterality Date   • ADENOIDECTOMY  04/01/2016    Adenoidectomy   • CATARACT EXTRACTION  04/01/2016    Cataract Surgery   • CORONARY ARTERY BYPASS GRAFT  04/01/2016    CABG   • CT GUIDED PERCUTANEOUS PERITONEAL OR RETROPERITONEAL FLUID COLLECTION DRAINAGE  10/8/2018    CT GUIDED PERCUTANEOUS PERITONEAL OR RETROPERITONEAL FLUID COLLECTION DRAINAGE 10/8/2018 [facility] [place] CLINICAL LEGACY   • ILEOSTOMY  11/19/2022    Ileostomy   • OTHER SURGICAL HISTORY  04/01/2016    Cath Atherectomy 3 Left Internal Mammary Graft   • OTHER SURGICAL HISTORY  12/09/2021    Pacemaker insertion   • OTHER SURGICAL HISTORY  12/09/2021    Cardioversion   • OTHER SURGICAL HISTORY  10/23/2018    Total Abdominal Colectomy   • OTHER SURGICAL HISTORY  10/22/2018    Complete Proctectomy Combined APR W/ Colostomy Laparoscopic   • ROTATOR CUFF REPAIR  04/01/2016    Rotator Cuff Repair   • TONSILLECTOMY  04/01/2016    Tonsillectomy   • TOTAL KNEE ARTHROPLASTY  04/01/2016    Knee Replacement   • US GUIDED ABSCESS DRAIN  10/1/2018    US GUIDED ABSCESS DRAIN 10/1/2018 [facility] [place] CLINICAL LEGACY   • US GUIDED PERCUTANEOUS PERITONEAL OR RETROPERITONEAL FLUID COLLECTION DRAINAGE  10/1/2018    US GUIDED PERCUTANEOUS PERITONEAL OR RETROPERITONEAL FLUID COLLECTION DRAINAGE  10/1/2018 [facility] CLINICAL LEGACY     Family History   Problem Relation Name Age of Onset   • Other (Cardiac disorder) Other Sibling      Social History     Tobacco Use   • Smoking status: Not on file   • Smokeless tobacco: Not on file   Substance Use Topics   • Alcohol use: Not on file   • Drug use: Not on file       Physical Exam   ED Triage Vitals [10/05/23 0911]   Temp Heart Rate Resp BP   35.9 °C (96.7 °F) 71 18 116/59      SpO2 Temp Source Heart Rate Source Patient Position   97 % Temporal Monitor --      BP Location FiO2 (%)     -- --       Physical Exam    ED Course & MDM   ED Course as of 11/26/23 2116   Thu Oct 05, 2023   0938 AV dual paced rhythm at a rate of 71 bpm, prolonged QTc prolonged QRS similar to prior with a PVC. [CF]   2115 Patient advised hospitalization he is leaving AMA. [MT]      ED Course User Index  [CF] Litzy Echeverria MD  [MT] Indio Appiah MD         Diagnoses as of 11/26/23 2116   Acute on chronic congestive heart failure, unspecified heart failure type (CMS/HCC)   Generalized weakness   Head injury, initial encounter   Anemia, unspecified type   NSTEMI (non-ST elevated myocardial infarction) (CMS/[facility])       Medical Decision Making      Procedure  Procedures     Indio Appiah MD  11/26/23 2116       Indio Appiah MD  11/26/23 2117

## 2023-10-05 NOTE — CONSULTS
Inpatient consult to Medicine  Consult performed by: Dianne Godinez PA-C  Consult ordered by: Litzy Echeverria MD      Reason For Consult  Medical management    History Of Present Illness  Michael Duran is a 83 y.o.  male with PMHx s/f CAD s/p CABGx3 and PCI to RCA 05/10/22, severe symptomatic Mobitz type I s/p PPM, HFmrEF (LVEF 45-50%), paroxysmal atrial fibrillation/flutter s/p DCCV on Eliquis, HTN, HLD, COPD (no baseline O2), rectal CA s/p proctocolectomy 2018 (with colostomy) and recurrent GI bleeds.  Patient has had repeated admissions in the past for congestive heart failure and anemia secondary to GI bleeds.  Patient receives the majority of his care within [facility].  He states that he was recently discharged from [facility] for congestive heart failure.  He states that for the past 1 week and since his last discharge he has progressively worsening general weakness, shortness of breath, coughing, wheezing and congestion.  He has noticed increasing swelling of bilateral lower extremities from his thighs down ultimately to his feet and feelings of heaviness.  He has noticed may be a 5 to 10 pound weight gain over the past 1 week.  He states that about 2 days ago he felt somewhat lightheaded and felt that and hit his head but denies losing consciousness.  He further stated that he does not know why he keeps on getting admitted back to the hospital despite being compliant with all of his doctors recommendations and medications.  When further probed with regards to compliance with medications he states that occasionally he will miss a few doses.  He denies any recent sick contacts, chemical/environmental exposures, changes in dietary habits or any recent traumatic events/falls other than noted above.  He denies any fevers, chills, night sweats, vision changes, auditory changes, change in taste/smell, loss of bowel/bladder control, loss of consciousness, vertigo, syncope,  seizure-like activity, chest pain, palpitations, hemoptysis, hematemesis, abdominal pain, nausea, vomiting, diarrhea, constipation, dysuria, hematuria, dyschezia, hematochezia or any lateralizing motor/sensory deficits other than noted above.     Past Medical History  He has a past medical history of Atherosclerosis of coronary artery bypass graft(s) without angina pectoris (07/15/2016), Atherosclerosis of coronary artery bypass graft(s), unspecified, with other forms of angina pectoris (CMS/[facility]) (04/22/2019), Benign prostatic hyperplasia without lower urinary tract symptoms, Other chest pain (04/01/2016), Other chest pain (09/17/2019), Other chest pain (05/11/2018), Personal history of other diseases of the circulatory system (04/01/2016), Personal history of other diseases of the digestive system, Personal history of other diseases of the musculoskeletal system and connective tissue, Personal history of other diseases of the musculoskeletal system and connective tissue (12/07/2018), Personal history of other diseases of urinary system (06/21/2019), Personal history of other specified conditions (04/01/2016), and Personal history of other specified conditions (07/15/2016).    Surgical History  He has a past surgical history that includes Rotator cuff repair (04/01/2016); Other surgical history (04/01/2016); Coronary artery bypass graft (04/01/2016); Cataract extraction (04/01/2016); Total knee arthroplasty (04/01/2016); Tonsillectomy (04/01/2016); Adenoidectomy (04/01/2016); Other surgical history (12/09/2021); Other surgical history (12/09/2021); Ileostomy (11/19/2022); Other surgical history (10/23/2018); Other surgical history (10/22/2018); CT guided percutaneous peritoneal or retroperitoneal fluid collection drainage (10/8/2018); US guided percutaneous peritoneal or retroperitoneal fluid collection drainage (10/1/2018); and US guided abscess drain (10/1/2018).     Social History  He has no history on file for  tobacco use, alcohol use, and drug use.    Family History  Family History   Problem Relation Name Age of Onset    Other (Cardiac disorder) Other Sibling        Allergies  Ace inhibitors, Eptifibatide, Ezetimibe, Ezetimibe-simvastatin, Metoprolol, Pravastatin, and Rosuvastatin    Review of Systems  Review of Systems   All other systems reviewed and are negative.  -12-point ROS reviewed and found to be negative aside from aforementioned positives in HPI     Physical Exam  Physical Exam  Vitals and nursing note reviewed.   Constitutional:       General: He is not in acute distress.     Appearance: Normal appearance. He is not ill-appearing.   HENT:      Head: Normocephalic and atraumatic.      Mouth/Throat:      Mouth: Mucous membranes are moist.      Pharynx: Oropharynx is clear. No oropharyngeal exudate or posterior oropharyngeal erythema.   Eyes:      Extraocular Movements: Extraocular movements intact.      Conjunctiva/sclera: Conjunctivae normal.      Pupils: Pupils are equal, round, and reactive to light.   Neck:      Vascular: No carotid bruit.   Cardiovascular:      Rate and Rhythm: Normal rate and regular rhythm.      Pulses: Normal pulses.      Heart sounds: Normal heart sounds. No murmur heard.  Pulmonary:      Effort: Pulmonary effort is normal.      Breath sounds: Wheezing present.   Chest:      Chest wall: No tenderness.   Abdominal:      General: Abdomen is flat. Bowel sounds are normal. There is no distension.      Palpations: Abdomen is soft.      Tenderness: There is no abdominal tenderness. There is no guarding.      Comments: Ostomy with brown stool and minimal irritation at appliance site. No blood present at this time   Musculoskeletal:         General: Normal range of motion.      Cervical back: Normal range of motion and neck supple. No rigidity.      Right lower leg: Edema present.      Left lower leg: Edema present.   Skin:     General: Skin is warm and dry.      Capillary Refill: Capillary  refill takes less than 2 seconds.      Findings: No erythema or lesion.      Comments: Venous stasis changes with bilateral lower extremity edema to just above the knees   Neurological:      General: No focal deficit present.      Mental Status: He is alert and oriented to person, place, and time.      Cranial Nerves: No cranial nerve deficit.      Sensory: No sensory deficit.   Psychiatric:         Mood and Affect: Mood normal.         Behavior: Behavior normal.         Thought Content: Thought content normal.         Judgment: Judgment normal.        Last Recorded Vitals  /53   Pulse 78   Temp 36.1 °C (97 °F)   Resp 22   Wt 84.4 kg (186 lb)   SpO2 96%     Relevant Results  ED Course as of 10/05/23 1711   Thu Oct 05, 2023   0938 AV dual paced rhythm at a rate of 71 bpm, prolonged QTc prolonged QRS similar to prior with a PVC. [CF]      ED Course User Index  [CF] Litzy Echeverria MD      Labs Reviewed   CBC WITH AUTO DIFFERENTIAL - Abnormal       Result Value    WBC 5.8      nRBC 0.0      RBC 2.39 (*)     Hemoglobin 7.3 (*)     Hematocrit 23.5 (*)     MCV 98      MCH 30.5      MCHC 31.1 (*)     RDW 20.3 (*)     Platelets 118 (*)     MPV 10.2      Immature Granulocytes %, Automated 1.4 (*)     Immature Granulocytes Absolute, Automated 0.08      Narrative:     The previously reported component Neutrophils % is no longer being reported.  The previously reported component Lymphocytes % is no longer being reported.  The previously reported component Monocytes % is no longer being reported.  The previously   reported component Eosinophils % is no longer being reported.  The previously reported component Basophils % is no longer being reported.  The previously reported component Absolute Neutrophils is no longer being reported.  The previously reported   component Absolute Lymphocytes is no longer being reported.  The previously reported component Absolute Monocytes is no longer being reported.  The  previously reported component Absolute Eosinophils is no longer being reported.  The previously reported   component Absolute Basophils is no longer being reported.   COMPREHENSIVE METABOLIC PANEL - Abnormal    Glucose 137 (*)     Sodium 131 (*)     Potassium 5.0      Chloride 93 (*)     Bicarbonate 25      Anion Gap 18      Urea Nitrogen 73 (*)     Creatinine 2.63 (*)     eGFR 23 (*)     Calcium 10.1      Albumin 3.2 (*)     Alkaline Phosphatase 122      Total Protein 7.6      AST 53 (*)     Bilirubin, Total 1.0      ALT 26     TROPONIN I, HIGH SENSITIVITY - Abnormal    Troponin I, High Sensitivity 133 (*)     Narrative:     Less than 99th percentile of normal range cutoff-  Female and children under 18 years old <14 ng/L; Male <21 ng/L: Negative  Repeat testing should be performed if clinically indicated.     Female and children under 18 years old 14-50 ng/L; Male 21-50 ng/L:  Consistent with possible cardiac damage and possible increased clinical   risk. Serial measurements may help to assess extent of myocardial damage.     >50 ng/L: Consistent with cardiac damage, increased clinical risk and  myocardial infarction. Serial measurements may help assess extent of   myocardial damage.      NOTE: Children less than 1 year old may have higher baseline troponin   levels and results should be interpreted in conjunction with the overall   clinical context.     NOTE: Troponin I testing is performed using a different   testing methodology at [facility]] than at other   [facility]. Direct result comparisons should only   be made within the same method.   B-TYPE NATRIURETIC PEPTIDE - Abnormal    BNP 1,311 (*)     Narrative:        <100 pg/mL - Heart failure unlikely  100-299 pg/mL - Intermediate probability of acute heart                  failure exacerbation. Correlate with clinical                  context and patient history.    >=300 pg/mL - Heart Failure likely. Correlate with clinical                   context and patient history.    BNP testing is performed using different testing methodology at [facility] than at other [facility]. Direct result comparisons should only be made within the same method.      TROPONIN I, HIGH SENSITIVITY - Abnormal    Troponin I, High Sensitivity 145 (*)     Narrative:     Less than 99th percentile of normal range cutoff-  Female and children under 18 years old <14 ng/L; Male <21 ng/L: Negative  Repeat testing should be performed if clinically indicated.     Female and children under 18 years old 14-50 ng/L; Male 21-50 ng/L:  Consistent with possible cardiac damage and possible increased clinical   risk. Serial measurements may help to assess extent of myocardial damage.     >50 ng/L: Consistent with cardiac damage, increased clinical risk and  myocardial infarction. Serial measurements may help assess extent of   myocardial damage.      NOTE: Children less than 1 year old may have higher baseline troponin   levels and results should be interpreted in conjunction with the overall   clinical context.     NOTE: Troponin I testing is performed using a different   testing methodology at [facility] than at other   [facility]. Direct result comparisons should only   be made within the same method.   MANUAL DIFFERENTIAL - Abnormal    Neutrophils %, Manual 78.0      Bands %, Manual 6.0      Lymphocytes %, Manual 6.0      Monocytes %, Manual 4.0      Eosinophils %, Manual 4.0      Basophils %, Manual 0.0      Metamyelocytes %, Manual 2.0      Seg Neutrophils Absolute, Manual 4.52      Bands Absolute, Manual 0.35      Lymphocytes Absolute, Manual 0.35 (*)     Monocytes Absolute, Manual 0.23      Eosinophils Absolute, Manual 0.23      Basophils Absolute, Manual 0.00      Metamyelocytes Absolute, Manual 0.12      Total Cells Counted 100      Neutrophils Absolute, Manual 4.87      RBC Morphology See Below      Hypochromia Mild      Ovalocytes Few       Teardrop Cells Few     MAGNESIUM - Normal    Magnesium 2.31        ED Medication Administration from 10/05/2023 0856 to 10/05/2023 1711         Date/Time Order Dose Route Action Action by     10/05/2023 1125 EDT LORazepam (Ativan) injection 0.5 mg 0.5 mg intravenous Given Nadeem, C     10/05/2023 1316 EDT furosemide (Lasix) injection 80 mg 80 mg intravenous Given Lawrence, C         XR chest 2 views    Result Date: 10/5/2023  STUDY: Chest Radiographs;  10/05/2023, 10:00AM INDICATION: Shortness of breath. COMPARISON: 05/31/2023, 06/14/2022 XR chest ACCESSION NUMBER(S): [id number] ORDERING CLINICIAN: RAMO HAWKINS TECHNIQUE:  Frontal and lateral chest. FINDINGS: CARDIOMEDIASTINAL SILHOUETTE: The patient status post median sternotomy. There is a dual lead pacemaker present. Cardiomediastinal silhouette is enlarged.  LUNGS: There are bibasilar infiltrates. These are linear and may represent subsegmental atelectasis but infection cannot be excluded.  ABDOMEN: No remarkable upper abdominal findings.  BONES: No acute osseous changes.    Bibasilar pulmonary infiltrates most likely representing atelectasis. Signed by Teo Nunez MD    CT head wo IV contrast    Result Date: 10/5/2023  Interpreted By:  Geovany Sebastian, STUDY: CT HEAD WO IV CONTRAST;  10/5/2023 9:41 am   INDICATION: Signs/Symptoms:HS.   COMPARISON: 02/21/2022   ACCESSION NUMBER(S): [id number]   ORDERING CLINICIAN: RAMO HAWKINS   TECHNIQUE: Noncontrast axial CT scan of head was performed. Angled reformats in brain and bone windows were generated. The images were reviewed in bone, brain, blood and soft tissue windows.   FINDINGS: No acute edema. No acute hemorrhage. No mass effect. Ventricular system is normal for age. No extra-axial fluid collections. Orbits are normal. Paranasal sinuses are clear.   Dense calcifications of the vertebral arteries consistent with atherosclerosis.       No acute findings.   Signed by: Geovany Sebastian 10/5/2023 9:57  AM Dictation workstation:   [id number]    Point of Care Ultrasound    Result Date: 10/5/2023  Litzy Echeverria MD     10/5/2023  3:42 PM Performed by: Litzy Echeverria MD Authorized by: Litzy Echeverria MD  Procedure: Cardiac Ultrasound Findings:  Views: parasternal long, parasternal short, apical four and subxiphoid Effusion: The pericardial space was visualized and was positive for a PERICARDIAL EFFUSION. Activity: Ventricular contractions were visualized. LV: LV systolic function was DECREASED. RV: RV size was NORMAL. Impression: Cardiac: The focused cardiac ultrasound exam had ABNORMAL findings as specified. Comments: Pericardial effusion but no signs of tamponade.  IVC is plump.     Assessment/Plan     1.) Acute on chronic CHF  -Evidenced by presenting sxs: SOB, RENAE, LE edema, weight gain  -Echocardiogram update ordered   -BNP: 1311  -Initiate lasix 40mg IVP BID  -Monitor renal function closely with diuresis   -Monitor electrolytes and replenish generously as needed   -Strict I&O’s   -2g salt restriction, 2L fluid restriction   -Monitor fluid status closely   -Cardiology consultation, appreciate further recommendations     2.) Elevated troponin (fairly persistent); hx CAD s/p CABGx3 and PCI to RCA 05/10/22   -Patient does have a fairly persistently elevated troponin (in low 100s), today it is slightly more so than the previous times at 133-145 -->   -Patient is currently denying any chest pain or anginal symptoms.  He does have no acute ischemic changes noted on his ECG  -At this time, suspect demand in relation to his decompensated heart failure and elevated serum creatinine.  -Patient will be continued on his home therapies, monitored on telemetry, and will have a repeat echocardiogram obtained    3.) pAFib   -Continue Eliquis   -Continue home therapies  -Patient is being set up for watchman evaluation in setting of recurrent anemia/GIB, which is felt to precipitate his HF symptoms     4.) Severe  symptomatic Mobitz type I s/p PPM  -Continue tele     5.) HTN, HLD  -Continue home medications     6.) Rectal CA s/p proctocolectomy 2018 (with colostomy) and recurrent GI bleeds, chronic anemia  -Patient with a fairly stable anemia at this time, denying any evidence of recurrent GI bleeding at this time  -As mentioned above, patient is supposed to be evaluated for a Watchman procedure, as there is concern that recurrent bleeding and anemia are precipitating some of his heart failure exacerbations  -Continue to trend hemoglobin    7.) COPD  -Not in acute exacerbation   -Continue home therapies    Dianne Godinez PA-C    I spent 80 minutes in the cumulative care of this patient on this date.

## 2023-10-06 NOTE — SIGNIFICANT EVENT
1.  Please see initial H&P for presentation and work-up thus far.  2.  Notified by nursing staff that the patient had some dark melanotic/bright red blood in his ostomy bag.  3.  Reexamination noted some blood-streaked stool within the ostomy bag and a clean/healthy/beefy red appearing stoma without any surrounding significant skin breakdown.  4.  Patient's home Eliquis was held.  Should be noted that patient is still on Eliquis as he is currently awaiting Watchman procedure with regards to his atrial fibrillation supposedly at [facility].  5.  A stat hemoglobin/hematocrit as well as a type and screen was ordered  6.  Should the patient become hemodynamically unstable or have a hemoglobin of less than 7 then will need to be transfused.  7.  Initial blood pressure on presentation was 116/59 has steadily declined and is now in 90s/60s with the last one being 98/62.

## 2023-10-06 NOTE — H&P
History Of Present Illness  Michael Duran is a 83 y.o.  male with PMHx s/f CAD s/p CABGx3 and PCI to RCA 05/10/22, severe symptomatic Mobitz type I s/p PPM, HFmrEF (LVEF 45-50%), paroxysmal atrial fibrillation/flutter s/p DCCV on Eliquis, HTN, HLD, COPD (no baseline O2), rectal CA s/p proctocolectomy 2018 (with colostomy) and recurrent GI bleeds.  Patient has had repeated admissions in the past for congestive heart failure and anemia secondary to GI bleeds.  Patient receives the majority of his care within [facility].  He states that he was recently discharged from [facility] for congestive heart failure.  He states that for the past 1 week and since his last discharge he has progressively worsening general weakness, shortness of breath, coughing, wheezing and congestion.  He has noticed increasing swelling of bilateral lower extremities from his thighs down ultimately to his feet and feelings of heaviness.  He has noticed may be a 5 to 10 pound weight gain over the past 1 week.  He states that about 2 days ago he felt somewhat lightheaded and felt that and hit his head but denies losing consciousness.  He further stated that he does not know why he keeps on getting admitted back to the hospital despite being compliant with all of his doctors recommendations and medications.  When further probed with regards to compliance with medications he states that occasionally he will miss a few doses.  He denies any recent sick contacts, chemical/environmental exposures, changes in dietary habits or any recent traumatic events/falls other than noted above.  He denies any fevers, chills, night sweats, vision changes, auditory changes, change in taste/smell, loss of bowel/bladder control, loss of consciousness, vertigo, syncope, seizure-like activity, chest pain, palpitations, hemoptysis, hematemesis, abdominal pain, nausea, vomiting, diarrhea, constipation, dysuria, hematuria, dyschezia, hematochezia or  any lateralizing motor/sensory deficits other than noted above.     Past Medical History  He has a past medical history of Atherosclerosis of coronary artery bypass graft(s) without angina pectoris (07/15/2016), Atherosclerosis of coronary artery bypass graft(s), unspecified, with other forms of angina pectoris (CMS/HCC) (04/22/2019), Benign prostatic hyperplasia without lower urinary tract symptoms, Other chest pain (04/01/2016), Other chest pain (09/17/2019), Other chest pain (05/11/2018), Personal history of other diseases of the circulatory system (04/01/2016), Personal history of other diseases of the digestive system, Personal history of other diseases of the musculoskeletal system and connective tissue, Personal history of other diseases of the musculoskeletal system and connective tissue (12/07/2018), Personal history of other diseases of urinary system (06/21/2019), Personal history of other specified conditions (04/01/2016), and Personal history of other specified conditions (07/15/2016).     Surgical History  He has a past surgical history that includes Rotator cuff repair (04/01/2016); Other surgical history (04/01/2016); Coronary artery bypass graft (04/01/2016); Cataract extraction (04/01/2016); Total knee arthroplasty (04/01/2016); Tonsillectomy (04/01/2016); Adenoidectomy (04/01/2016); Other surgical history (12/09/2021); Other surgical history (12/09/2021); Ileostomy (11/19/2022); Other surgical history (10/23/2018); Other surgical history (10/22/2018); CT guided percutaneous peritoneal or retroperitoneal fluid collection drainage (10/8/2018); US guided percutaneous peritoneal or retroperitoneal fluid collection drainage (10/1/2018); and US guided abscess drain (10/1/2018).     Social History  He has no history on file for tobacco use, alcohol use, and drug use.     Family History  Family History          Family History   Problem Relation Name Age of Onset    Other (Cardiac disorder) Other Sibling               Allergies  Ace inhibitors, Eptifibatide, Ezetimibe, Ezetimibe-simvastatin, Metoprolol, Pravastatin, and Rosuvastatin     Review of Systems  Review of Systems   All other systems reviewed and are negative.  -12-point ROS reviewed and found to be negative aside from aforementioned positives in HPI      Physical Exam  Physical Exam  Vitals and nursing note reviewed.   Constitutional:       General: He is not in acute distress.     Appearance: Normal appearance. He is not ill-appearing.   HENT:      Head: Normocephalic and atraumatic.      Mouth/Throat:      Mouth: Mucous membranes are moist.      Pharynx: Oropharynx is clear. No oropharyngeal exudate or posterior oropharyngeal erythema.   Eyes:      Extraocular Movements: Extraocular movements intact.      Conjunctiva/sclera: Conjunctivae normal.      Pupils: Pupils are equal, round, and reactive to light.   Neck:      Vascular: No carotid bruit.   Cardiovascular:      Rate and Rhythm: Normal rate and regular rhythm.      Pulses: Normal pulses.      Heart sounds: Normal heart sounds. No murmur heard.  Pulmonary:      Effort: Pulmonary effort is normal.      Breath sounds: Wheezing present.   Chest:      Chest wall: No tenderness.   Abdominal:      General: Abdomen is flat. Bowel sounds are normal. There is no distension. Ostomy with brown stool and minimal irritation at appliance site. No blood present at this time       Palpations: Abdomen is soft.      Tenderness: There is no abdominal tenderness. There is no guarding.   Musculoskeletal:         General: Normal range of motion.      Cervical back: Normal range of motion and neck supple. No rigidity.      Right lower leg: Edema present.      Left lower leg: Edema present.   Skin:     General: Skin is warm and dry.      Capillary Refill: Capillary refill takes less than 2 seconds.      Findings: No erythema or lesion.      Comments: Venous stasis changes with bilateral lower extremity edema to just above the  knees   Neurological:      General: No focal deficit present.      Mental Status: He is alert and oriented to person, place, and time.      Cranial Nerves: No cranial nerve deficit.      Sensory: No sensory deficit.   Psychiatric:         Mood and Affect: Mood normal.         Behavior: Behavior normal.         Thought Content: Thought content normal.         Judgment: Judgment normal.         Last Recorded Vitals  /53   Pulse 78   Temp 36.1 °C (97 °F)   Resp 22   Wt 84.4 kg (186 lb)   SpO2 96%      Relevant Results      ED Course as of 10/05/23 1711   Thu Oct 05, 2023   0938 AV dual paced rhythm at a rate of 71 bpm, prolonged QTc prolonged QRS similar to prior with a PVC. [CF]       ED Course User Index  [CF] Litzy Echeverria MD            Labs Reviewed   CBC WITH AUTO DIFFERENTIAL - Abnormal       Result Value      WBC 5.8        nRBC 0.0        RBC 2.39 (*)       Hemoglobin 7.3 (*)       Hematocrit 23.5 (*)       MCV 98        MCH 30.5        MCHC 31.1 (*)       RDW 20.3 (*)       Platelets 118 (*)       MPV 10.2        Immature Granulocytes %, Automated 1.4 (*)       Immature Granulocytes Absolute, Automated 0.08        Narrative:      The previously reported component Neutrophils % is no longer being reported.  The previously reported component Lymphocytes % is no longer being reported.  The previously reported component Monocytes % is no longer being reported.  The previously   reported component Eosinophils % is no longer being reported.  The previously reported component Basophils % is no longer being reported.  The previously reported component Absolute Neutrophils is no longer being reported.  The previously reported   component Absolute Lymphocytes is no longer being reported.  The previously reported component Absolute Monocytes is no longer being reported.  The previously reported component Absolute Eosinophils is no longer being reported.  The previously reported   component Absolute  Basophils is no longer being reported.   COMPREHENSIVE METABOLIC PANEL - Abnormal     Glucose 137 (*)       Sodium 131 (*)       Potassium 5.0        Chloride 93 (*)       Bicarbonate 25        Anion Gap 18        Urea Nitrogen 73 (*)       Creatinine 2.63 (*)       eGFR 23 (*)       Calcium 10.1        Albumin 3.2 (*)       Alkaline Phosphatase 122        Total Protein 7.6        AST 53 (*)       Bilirubin, Total 1.0        ALT 26      TROPONIN I, HIGH SENSITIVITY - Abnormal     Troponin I, High Sensitivity 133 (*)       Narrative:      Less than 99th percentile of normal range cutoff-  Female and children under 18 years old <14 ng/L; Male <21 ng/L: Negative  Repeat testing should be performed if clinically indicated.      Female and children under 18 years old 14-50 ng/L; Male 21-50 ng/L:  Consistent with possible cardiac damage and possible increased clinical   risk. Serial measurements may help to assess extent of myocardial damage.      >50 ng/L: Consistent with cardiac damage, increased clinical risk and  myocardial infarction. Serial measurements may help assess extent of   myocardial damage.      NOTE: Children less than 1 year old may have higher baseline troponin   levels and results should be interpreted in conjunction with the overall   clinical context.      NOTE: Troponin I testing is performed using a different   testing methodology at [facility] than at other   [facility]. Direct result comparisons should only   be made within the same method.   B-TYPE NATRIURETIC PEPTIDE - Abnormal     BNP 1,311 (*)       Narrative:         <100 pg/mL - Heart failure unlikely  100-299 pg/mL - Intermediate probability of acute heart                  failure exacerbation. Correlate with clinical                  context and patient history.    >=300 pg/mL - Heart Failure likely. Correlate with clinical                  context and patient history.     BNP testing is performed using different  testing methodology at [facility] than at other [facility]. Direct result comparisons should only be made within the same method.       TROPONIN I, HIGH SENSITIVITY - Abnormal     Troponin I, High Sensitivity 145 (*)       Narrative:      Less than 99th percentile of normal range cutoff-  Female and children under 18 years old <14 ng/L; Male <21 ng/L: Negative  Repeat testing should be performed if clinically indicated.      Female and children under 18 years old 14-50 ng/L; Male 21-50 ng/L:  Consistent with possible cardiac damage and possible increased clinical   risk. Serial measurements may help to assess extent of myocardial damage.      >50 ng/L: Consistent with cardiac damage, increased clinical risk and  myocardial infarction. Serial measurements may help assess extent of   myocardial damage.      NOTE: Children less than 1 year old may have higher baseline troponin   levels and results should be interpreted in conjunction with the overall   clinical context.      NOTE: Troponin I testing is performed using a different   testing methodology at [facility] than at other   [facility]. Direct result comparisons should only   be made within the same method.   MANUAL DIFFERENTIAL - Abnormal     Neutrophils %, Manual 78.0        Bands %, Manual 6.0        Lymphocytes %, Manual 6.0        Monocytes %, Manual 4.0        Eosinophils %, Manual 4.0        Basophils %, Manual 0.0        Metamyelocytes %, Manual 2.0        Seg Neutrophils Absolute, Manual 4.52        Bands Absolute, Manual 0.35        Lymphocytes Absolute, Manual 0.35 (*)       Monocytes Absolute, Manual 0.23        Eosinophils Absolute, Manual 0.23        Basophils Absolute, Manual 0.00        Metamyelocytes Absolute, Manual 0.12        Total Cells Counted 100        Neutrophils Absolute, Manual 4.87        RBC Morphology See Below        Hypochromia Mild        Ovalocytes Few        Teardrop Cells Few       MAGNESIUM - Normal     Magnesium 2.31         ED Medication Administration from 10/05/2023 0856 to 10/05/2023 1711           Date/Time Order Dose Route Action Action by       10/05/2023 1125 EDT LORazepam (Ativan) injection 0.5 mg 0.5 mg intravenous Given Nadeem, C       10/05/2023 1316 EDT furosemide (Lasix) injection 80 mg 80 mg intravenous Given Cabazon, C           XR chest 2 views     Result Date: 10/5/2023  STUDY: Chest Radiographs;  10/05/2023, 10:00AM INDICATION: Shortness of breath. COMPARISON: 05/31/2023, 06/14/2022 XR chest ACCESSION NUMBER(S): [id number] ORDERING CLINICIAN: RAMO HAWKINS TECHNIQUE:  Frontal and lateral chest. FINDINGS: CARDIOMEDIASTINAL SILHOUETTE: The patient status post median sternotomy. There is a dual lead pacemaker present. Cardiomediastinal silhouette is enlarged.  LUNGS: There are bibasilar infiltrates. These are linear and may represent subsegmental atelectasis but infection cannot be excluded.  ABDOMEN: No remarkable upper abdominal findings.  BONES: No acute osseous changes.     Bibasilar pulmonary infiltrates most likely representing atelectasis. Signed by Teo Nunez MD     CT head wo IV contrast     Result Date: 10/5/2023  Interpreted By:  Geovany Sebastian, STUDY: CT HEAD WO IV CONTRAST;  10/5/2023 9:41 am   INDICATION: Signs/Symptoms:HS.   COMPARISON: 02/21/2022   ACCESSION NUMBER(S): [id number]   ORDERING CLINICIAN: RAMO HAWKINS   TECHNIQUE: Noncontrast axial CT scan of head was performed. Angled reformats in brain and bone windows were generated. The images were reviewed in bone, brain, blood and soft tissue windows.   FINDINGS: No acute edema. No acute hemorrhage. No mass effect. Ventricular system is normal for age. No extra-axial fluid collections. Orbits are normal. Paranasal sinuses are clear.   Dense calcifications of the vertebral arteries consistent with atherosclerosis.        No acute findings.   Signed by: Geovany Sebastian 10/5/2023 9:57 AM  Dictation workstation:   [id number]     Point of Care Ultrasound     Result Date: 10/5/2023  Litzy Echeverria MD     10/5/2023  3:42 PM Performed by: Litzy Echeverria MD Authorized by: Litzy Echeverria MD  Procedure: Cardiac Ultrasound Findings:  Views: parasternal long, parasternal short, apical four and subxiphoid Effusion: The pericardial space was visualized and was positive for a PERICARDIAL EFFUSION. Activity: Ventricular contractions were visualized. LV: LV systolic function was DECREASED. RV: RV size was NORMAL. Impression: Cardiac: The focused cardiac ultrasound exam had ABNORMAL findings as specified. Comments: Pericardial effusion but no signs of tamponade.  IVC is plump.     Assessment/Plan      1.) Acute on chronic CHF  -Evidenced by presenting sxs: SOB, RENAE, LE edema, weight gain  -Echocardiogram update ordered   -BNP: 1311  -Initiate lasix 40mg IVP BID  -Monitor renal function closely with diuresis   -Monitor electrolytes and replenish generously as needed   -Strict I&O’s   -2g salt restriction, 2L fluid restriction   -Monitor fluid status closely   -Cardiology consultation, appreciate further recommendations      2.) Elevated troponin (fairly persistent); hx CAD s/p CABGx3 and PCI to RCA 05/10/22   -Patient does have a fairly persistently elevated troponin (in low 100s), today it is slightly more so than the previous times at 133-145 -->   -Patient is currently denying any chest pain or anginal symptoms.  He does have no acute ischemic changes noted on his ECG  -At this time, suspect demand in relation to his decompensated heart failure and elevated serum creatinine.  -Patient will be continued on his home therapies, monitored on telemetry, and will have a repeat echocardiogram obtained     3.) pAFib   -Continue Eliquis   -Continue home therapies  -Patient is being set up for watchman evaluation in setting of recurrent anemia/GIB, which is felt to precipitate his HF symptoms      4.)  Severe symptomatic Mobitz type I s/p PPM  -Continue tele      5.) HTN, HLD  -Continue home medications      6.) Rectal CA s/p proctocolectomy 2018 (with colostomy) and recurrent GI bleeds, chronic anemia  -Patient with a fairly stable anemia at this time, denying any evidence of recurrent GI bleeding at this time  -As mentioned above, patient is supposed to be evaluated for a Watchman procedure, as there is concern that recurrent bleeding and anemia are precipitating some of his heart failure exacerbations  -Continue to trend hemoglobin     7.) COPD  -Not in acute exacerbation   -Continue home therapies    8.) Acute kidney injury on chronic kidney disease  -Patient's most recent baseline appears to be somewhere in the range of 1.6-1.9, however does appear to have been uptrending in the last month.  He is presenting today with a serum creatinine of 2.63 and a BUN of 73     Dianne Godinez PA-C     I spent 80 minutes in the cumulative care of this patient on this date.

## 2023-10-06 NOTE — SIGNIFICANT EVENT
Patient leaving AGAINST MEDICAL ADVICE    1.  Called by nursing staff to the bedside stating that the patient wanted to leave the hospital.  2.  I reviewed the patient's initial presentation lab work thus far and noted to him that his hemoglobin was decreased and he required a blood transfusion.  He was adamant in stating that he did not feel any of this was necessary and that we were trying to entrap him in a basement and that it was inhumane to not giving him a room immediately.  3.  I apologized to the patient with regards to delay in obtaining a medical floor room as of right now the hospital is at capacity and a bed is not available until the morning.  4.  I explained to him that he would require a blood transfusion and he stated that he did not feel this was necessary.  5.  I recommended to him that he remain in the hospital as well due to his slightly decreased blood pressure now and he was adamant that he was going to leave stating that we were even depriving him of food and water.  I did inform him that he did have water and candy at the bedside and that we could provide him with a ham sandwich and a little lunch pack but he said that that was not true and that I was lying.  6.  I again reiterated the patient's overall presentation work-up thus far and concerns for hypotension and now acute on chronic anemia likely from blood loss via his ostomy.  7.  Nursing staff had witnessed this.  8.  Patient had called his sister to come pick him up and I told her that he did not feel staying in this hospital was necessary.  9.  I explained to the patient that leaving AGAINST MEDICAL ADVICE could lead to worsening anemia, worsening congestive heart failure, syncope, other unforeseen worsening of his core morbidities or new core morbidities.  10.  Patient signed AMA form and is awaiting a ride by his sister to take him home.  11.  I instructed the patient to immediately return to the nearest emergency department should  he have any worsening symptoms.

## 2023-10-07 PROBLEM — K92.2 GI BLEEDING: Status: ACTIVE | Noted: 2023-01-01

## 2023-10-07 NOTE — ED PROVIDER NOTES
HPI   Chief Complaint   Patient presents with    Weakness, Gen     Also GI bleed and was seen at [facility] yesterday was to be admitted for CHF and low hemoglobin leg swelling and more per family sat in waiting room for 18+ hours and left decided to come back today and start over    GI Problem       HPI  Patient is an 83-year-old male with a past medical history significant for CAD status post CABG, CHF with an EF of 45%, atrial fibrillation on Eliquis, colon cancer status post colostomy with chronic GI bleed pending capsule endoscopy on October 11 who presents to the emergency room for multiple complaints.  According to family he was discharged from [facility] on Tuesday.  He was being treated for what sounds like a pericardial effusion, some weight gain and was discharged home after diuresis.  The family feels like he was discharged home too soon and he went home and was too weak to ambulate appropriately.  He fell and hit his head on a cabinet.  He also became very anxious and was taken to [facility].  At [facility] he was waiting in the emergency room for about 18 hours before being able to see a doctor according to family.  They wanted to admit him for CHF exacerbation and GI bleed but he was too tired of waiting for a bed and went home.  Since going home he has been too weak to ambulate.  He has not collapsed or fallen again.  He denies any chest pain, shortness of breath, fever, chills, nausea, vomiting.  He denies any new symptoms other than he cannot be at home because he is extremely weak.  Of note, the bleeding into the stoma has continued.  He states that he was recently transfused for anemia and that when that happens he normally gets more fluid overloaded and requires Lasix.  Additionally, he states that he gets anxious very easily and has these jolts of panic.      PMHx: As above  PSHx: As above  FamilyHx: Denies pertinent  SocialHx: Denies  Allergies: Per EMR  Medications: See Medication Reconciliation     ROS  As  above    Physical Exam    GENERAL: Awake and Alert, No Acute Distress  HEENT: AT/NC, PERRL, EOMI, Normal Oropharynx, No Signs of Dehydration  NECK: Normal Inspection, No JVD  CARDIOVASCULAR: RRR, No M/R/G  RESPIRATORY: Crackles heard best in the left lower lobe but otherwise no Rales or Rhonchi, Chest Wall Non-tender  ABDOMEN: Colostomy in place with dark maroon stool in the pouch but otherwise soft, non-tender abdomen, Normal Bowel Sounds, No Distention  BACK: No CVA Tenderness  SKIN: Normal Color, Warm, Dry, No Rashes   EXTREMITIES: Non-Tender, Full ROM, bilateral lower extremity edema with brawny induration that is mild.  NEURO: A&O x 3, Normal Motor and Sensation, Normal Mood and Affect    Nursing Assessment and Vitals Reviewed    EKG showed AV dual paced rhythm with occasional atrial paced complexes and at 71 bpm.  EKG similar to prior.  No ischemic ST changes.    Medical Decision  Patient seen and evaluated for multiple complaints as above including weakness, fluid overload, GI bleed.  He left AGAINST MEDICAL ADVICE from [place] after a prolonged wait in the emergency room.  Physical exam reveals a well-appearing male in no acute distress.  He does show some signs of crackles particularly in the left lower lobe.  He has some edema in bilateral lower extremities with some brawny induration.  Additionally, he has a benign abdomen but with a colostomy bag in place and maroon stool in the bag.  Blood pressures are soft but otherwise no tachycardia, tachypnea or hypoxia is noted.  He is breathing comfortably on room air.  Review of records shows that patient was at [place] [facility] on 5 October.  During that visit he reported having weight gain, recent falls and electric shocks throughout his body that were anxiety.  He had no chest pain but his troponins were elevated consistent with history of chronic elevation.  He had a bedside ultrasound that showed no signs of tamponade.  CT head was performed and showed no  intracranial hemorrhage.  He had bilateral pulmonary infiltrates that were believed to be atelectasis and his BNP was 1300.  He was given a dose of Lasix and medicine was consulted.  Patient signed himself out AGAINST MEDICAL ADVICE.    Work-up today is to include cardiac labs, chest x-ray, basic lab work.  Anticipate admission given patient failed attempts to stay home and is unable to complete his activities of daily living owing to extreme weakness.    Work-up for patient included labs that revealed significant anemia.  BUN and creatinine are similar to prior.  Troponin is elevated and slightly increased on repeat but he does run chronically elevated and is currently without any chest pain, shortness of breath.  Hemoglobin was 6.1 and he was consented for blood and ordered a transfusion.  Urinalysis are unremarkable.  COVID is negative.  Patient has remained in stable condition in the emergency room awaiting admission.                          No data recorded                Patient History   Past Medical History:   Diagnosis Date    Atherosclerosis of coronary artery bypass graft(s) without angina pectoris 07/15/2016    Atherosclerosis of coronary artery bypass graft(s) without angina pectoris    Atherosclerosis of coronary artery bypass graft(s), unspecified, with other forms of angina pectoris (CMS/[facility]) 04/22/2019    Coronary artery disease of bypass graft of native heart with stable angina pectoris    Benign prostatic hyperplasia without lower urinary tract symptoms     BPH (benign prostatic hyperplasia)    Other chest pain 04/01/2016    Chest discomfort    Other chest pain 09/17/2019    Atypical chest pain    Other chest pain 05/11/2018    Atypical chest pain    Personal history of other diseases of the circulatory system 04/01/2016    History of angina pectoris    Personal history of other diseases of the digestive system     History of ulcerative colitis    Personal history of other diseases of the  musculoskeletal system and connective tissue     History of osteoarthritis    Personal history of other diseases of the musculoskeletal system and connective tissue 12/07/2018    History of chronic back pain    Personal history of other diseases of urinary system 06/21/2019    History of hematuria    Personal history of other specified conditions 04/01/2016    History of chest pain    Personal history of other specified conditions 07/15/2016    History of precordial chest pain     Past Surgical History:   Procedure Laterality Date    ADENOIDECTOMY  04/01/2016    Adenoidectomy    CATARACT EXTRACTION  04/01/2016    Cataract Surgery    CORONARY ARTERY BYPASS GRAFT  04/01/2016    CABG    CT GUIDED PERCUTANEOUS PERITONEAL OR RETROPERITONEAL FLUID COLLECTION DRAINAGE  10/8/2018    CT GUIDED PERCUTANEOUS PERITONEAL OR RETROPERITONEAL FLUID COLLECTION DRAINAGE 10/8/2018 [facility] CLINICAL LEGACY    ILEOSTOMY  11/19/2022    Ileostomy    OTHER SURGICAL HISTORY  04/01/2016    Cath Atherectomy 3 Left Internal Mammary Graft    OTHER SURGICAL HISTORY  12/09/2021    Pacemaker insertion    OTHER SURGICAL HISTORY  12/09/2021    Cardioversion    OTHER SURGICAL HISTORY  10/23/2018    Total Abdominal Colectomy    OTHER SURGICAL HISTORY  10/22/2018    Complete Proctectomy Combined APR W/ Colostomy Laparoscopic    ROTATOR CUFF REPAIR  04/01/2016    Rotator Cuff Repair    TONSILLECTOMY  04/01/2016    Tonsillectomy    TOTAL KNEE ARTHROPLASTY  04/01/2016    Knee Replacement    US GUIDED ABSCESS DRAIN  10/1/2018    US GUIDED ABSCESS DRAIN 10/1/2018 [facility] CLINICAL LEGACY    US GUIDED PERCUTANEOUS PERITONEAL OR RETROPERITONEAL FLUID COLLECTION DRAINAGE  10/1/2018    US GUIDED PERCUTANEOUS PERITONEAL OR RETROPERITONEAL FLUID COLLECTION DRAINAGE 10/1/2018 [facility] CLINICAL LEGACY     Family History   Problem Relation Name Age of Onset    Other (Cardiac disorder) Other Sibling      Social History     Tobacco Use    Smoking status: Not on file    Smokeless  tobacco: Not on file   Substance Use Topics    Alcohol use: Not on file    Drug use: Not on file       Physical Exam   ED Triage Vitals [10/07/23 1012]   Temp Heart Rate Resp BP   35.8 °C (96.4 °F) 74 16 (!) 92/39      SpO2 Temp src Heart Rate Source Patient Position   97 % -- -- --      BP Location FiO2 (%)     -- --       Physical Exam    ED Course & MDM   Diagnoses as of 10/07/23 1833   GI bleeding       Medical Decision Making      Procedure  Procedures     Sylvia Joya MD  10/07/23 4212

## 2023-10-08 NOTE — CARE PLAN
The patient's goals for the shift include pt to be safe and comfortable throughout shift    The clinical goals for the shift include pt to remain safe and comfortable throughout shift    Over the shift, the patient did not make progress toward the following goals. Barriers to progression include none. Recommendations to address these barriers include none.

## 2023-10-08 NOTE — PROGRESS NOTES
Verification of Patient's Own Meds  Does the patient name on the medication container/label match the inpatient patient name? Yes  Is the medication in date (1 year from dispensing date)? Yes  Is the medication in the original dispensing container? Yes    Identification of Medication  Was the medication successfully identified? Yes  Was the Authorization for Treatment with Patient’s Own Medication” form (Attachment A in MM-16) completed? N/A      Tafamidis 61 mg PO daily      7

## 2023-10-08 NOTE — H&P
History Of Present Illness  Michael Duran is a 83 y.o. male presenting with maroon-colored stool in the colostomy bag, on and off, he came to the emergency department his hemoglobin was only 6.3, he was transfused and today hemoglobin is 7.3, he is admitted here for further management.  He was already seen by the gastroenterologist the plan is capsule endoscopy tomorrow and holding the Eliquis.  He denies any nausea vomiting diarrhea.  Discussed with the patient and the wife at the bedside.     Past Medical History  He has a past medical history of Atherosclerosis of coronary artery bypass graft(s) without angina pectoris (07/15/2016), Atherosclerosis of coronary artery bypass graft(s), unspecified, with other forms of angina pectoris (CMS/[facility]) (04/22/2019), Benign prostatic hyperplasia without lower urinary tract symptoms, Other chest pain (04/01/2016), Other chest pain (09/17/2019), Other chest pain (05/11/2018), Personal history of other diseases of the circulatory system (04/01/2016), Personal history of other diseases of the digestive system, Personal history of other diseases of the musculoskeletal system and connective tissue, Personal history of other diseases of the musculoskeletal system and connective tissue (12/07/2018), Personal history of other diseases of urinary system (06/21/2019), Personal history of other specified conditions (04/01/2016), and Personal history of other specified conditions (07/15/2016).    Surgical History  He has a past surgical history that includes Rotator cuff repair (04/01/2016); Other surgical history (04/01/2016); Coronary artery bypass graft (04/01/2016); Cataract extraction (04/01/2016); Total knee arthroplasty (04/01/2016); Tonsillectomy (04/01/2016); Adenoidectomy (04/01/2016); Other surgical history (12/09/2021); Other surgical history (12/09/2021); Ileostomy (11/19/2022); Other surgical history (10/23/2018); Other surgical history (10/22/2018); CT guided  "percutaneous peritoneal or retroperitoneal fluid collection drainage (10/8/2018); US guided percutaneous peritoneal or retroperitoneal fluid collection drainage (10/1/2018); and US guided abscess drain (10/1/2018).     Social History  He has no history on file for tobacco use, alcohol use, and drug use.    Family History  Family History   Problem Relation Name Age of Onset    Other (Cardiac disorder) Other Sibling         Allergies  Ace inhibitors, Eptifibatide, Ezetimibe, Ezetimibe-simvastatin, Metoprolol, Pravastatin, and Rosuvastatin    Review of Systems   Constitutional:  Positive for fatigue.   HENT: Negative.     Respiratory: Negative.     Cardiovascular: Negative.    Gastrointestinal:  Positive for blood in stool. Negative for abdominal distention, constipation and diarrhea.   Endocrine: Negative.    Musculoskeletal: Negative.    Skin: Negative.    Psychiatric/Behavioral: Negative.          Physical Exam  Constitutional:       Appearance: Normal appearance. He is normal weight.   HENT:      Head: Normocephalic and atraumatic.   Eyes:      Pupils: Pupils are equal, round, and reactive to light.   Cardiovascular:      Rate and Rhythm: Normal rate and regular rhythm.   Pulmonary:      Effort: Pulmonary effort is normal.      Breath sounds: Normal breath sounds.   Abdominal:      General: The ostomy site is clean. Bowel sounds are normal. There is no distension.      Palpations: Abdomen is soft.      Tenderness: There is no abdominal tenderness.   Musculoskeletal:         General: Normal range of motion.      Cervical back: Neck supple.   Neurological:      Mental Status: He is alert.          Last Recorded Vitals  Blood pressure 105/55, pulse 73, temperature 36.3 °C (97.3 °F), resp. rate 18, height 1.702 m (5' 7.01\"), weight 82 kg (180 lb 12.4 oz), SpO2 92 %.    Relevant Results  Results for orders placed or performed during the hospital encounter of 10/07/23 (from the past 96 hour(s))   CBC and Auto " Differential   Result Value Ref Range    WBC 3.8 (L) 4.4 - 11.3 x10*3/uL    nRBC 0.0 0.0 - 0.0 /100 WBCs    RBC 1.99 (L) 4.50 - 5.90 x10*6/uL    Hemoglobin 6.1 (LL) 13.5 - 17.5 g/dL    Hematocrit 20.3 (L) 41.0 - 52.0 %     (H) 80 - 100 fL    MCH 30.7 26.0 - 34.0 pg    MCHC 30.0 (L) 32.0 - 36.0 g/dL    RDW 21.9 (H) 11.5 - 14.5 %    Platelets 99 (L) 150 - 450 x10*3/uL    MPV 10.1 7.5 - 11.5 fL    Neutrophils % 65.3 40.0 - 80.0 %    Immature Granulocytes %, Automated 1.0 (H) 0.0 - 0.9 %    Lymphocytes % 18.8 13.0 - 44.0 %    Monocytes % 11.5 2.0 - 10.0 %    Eosinophils % 3.1 0.0 - 6.0 %    Basophils % 0.3 0.0 - 2.0 %    Neutrophils Absolute 2.50 1.60 - 5.50 x10*3/uL    Immature Granulocytes Absolute, Automated 0.04 0.00 - 0.50 x10*3/uL    Lymphocytes Absolute 0.72 (L) 0.80 - 3.00 x10*3/uL    Monocytes Absolute 0.44 0.05 - 0.80 x10*3/uL    Eosinophils Absolute 0.12 0.00 - 0.40 x10*3/uL    Basophils Absolute 0.01 0.00 - 0.10 x10*3/uL   Comprehensive metabolic panel   Result Value Ref Range    Glucose 103 (H) 74 - 99 mg/dL    Sodium 133 (L) 136 - 145 mmol/L    Potassium 6.5 (HH) 3.5 - 5.3 mmol/L    Chloride 96 (L) 98 - 107 mmol/L    Bicarbonate 28 21 - 32 mmol/L    Anion Gap 16 10 - 20 mmol/L    Urea Nitrogen 70 (H) 6 - 23 mg/dL    Creatinine 2.62 (H) 0.50 - 1.30 mg/dL    eGFR 24 (L) >60 mL/min/1.73m*2    Calcium 8.9 8.6 - 10.3 mg/dL    Albumin 3.1 (L) 3.4 - 5.0 g/dL    Alkaline Phosphatase 90 33 - 136 U/L    Total Protein 7.0 6.4 - 8.2 g/dL    AST 89 (H) 9 - 39 U/L    Bilirubin, Total 0.8 0.0 - 1.2 mg/dL    ALT 28 10 - 52 U/L   Troponin I, High Sensitivity   Result Value Ref Range    Troponin I, High Sensitivity 166 (HH) 0 - 20 ng/L   B-Type Natriuretic Peptide   Result Value Ref Range     (H) 0 - 99 pg/mL   Type And Screen   Result Value Ref Range    ABO TYPE O     Rh TYPE POS     ANTIBODY SCREEN NEG    Lactate   Result Value Ref Range    Lactate 1.7 0.4 - 2.0 mmol/L   Morphology   Result Value Ref Range     RBC Morphology See Below     Polychromasia Mild     Ovalocytes Few    Urinalysis with Reflex Microscopic and Culture   Result Value Ref Range    Color, Urine Yellow Straw, Yellow    Appearance, Urine Clear Clear    Specific Gravity, Urine 1.011 1.005 - 1.035    pH, Urine 6.0 5.0, 5.5, 6.0, 6.5, 7.0, 7.5, 8.0    Protein, Urine NEGATIVE NEGATIVE mg/dL    Glucose, Urine 150 (2+) (A) NEGATIVE mg/dL    Blood, Urine NEGATIVE NEGATIVE    Ketones, Urine NEGATIVE NEGATIVE mg/dL    Bilirubin, Urine NEGATIVE NEGATIVE    Urobilinogen, Urine <2.0 <2.0 mg/dL    Nitrite, Urine NEGATIVE NEGATIVE    Leukocyte Esterase, Urine NEGATIVE NEGATIVE   SARS-CoV-2 RT PCR   Result Value Ref Range    Coronavirus 2019, PCR Not Detected Not Detected   Potassium   Result Value Ref Range    Potassium 3.8 3.5 - 5.3 mmol/L   Troponin I, High Sensitivity   Result Value Ref Range    Troponin I, High Sensitivity 170 (HH) 0 - 20 ng/L   Prepare RBC: 1 Units   Result Value Ref Range    PRODUCT CODE Z5044T01     Unit Number [id number]     Unit ABO O     Unit RH POS     XM INTEP COMP     Dispense Status TR     Blood Expiration Date October 28, 2023 23:59 EDT     PRODUCT BLOOD TYPE 5100     UNIT VOLUME 400    Type And Screen   Result Value Ref Range    ABO TYPE O     Rh TYPE POS     ANTIBODY SCREEN NEG    Troponin I, High Sensitivity   Result Value Ref Range    Troponin I, High Sensitivity 156 (HH) 0 - 20 ng/L   Troponin I, High Sensitivity, Initial   Result Value Ref Range    Troponin I, High Sensitivity 156 (HH) 0 - 20 ng/L   CBC   Result Value Ref Range    WBC 4.2 (L) 4.4 - 11.3 x10*3/uL    nRBC 0.0 0.0 - 0.0 /100 WBCs    RBC 2.37 (L) 4.50 - 5.90 x10*6/uL    Hemoglobin 7.3 (L) 13.5 - 17.5 g/dL    Hematocrit 24.6 (L) 41.0 - 52.0 %     (H) 80 - 100 fL    MCH 30.8 26.0 - 34.0 pg    MCHC 29.7 (L) 32.0 - 36.0 g/dL    RDW 22.3 (H) 11.5 - 14.5 %    Platelets 94 (L) 150 - 450 x10*3/uL    MPV 10.3 7.5 - 11.5 fL   Comprehensive metabolic panel    Result Value Ref Range    Glucose 112 (H) 74 - 99 mg/dL    Sodium 132 (L) 136 - 145 mmol/L    Potassium 3.7 3.5 - 5.3 mmol/L    Chloride 97 (L) 98 - 107 mmol/L    Bicarbonate 25 21 - 32 mmol/L    Anion Gap 14 10 - 20 mmol/L    Urea Nitrogen 60 (H) 6 - 23 mg/dL    Creatinine 2.39 (H) 0.50 - 1.30 mg/dL    eGFR 26 (L) >60 mL/min/1.73m*2    Calcium 8.6 8.6 - 10.3 mg/dL    Albumin 3.1 (L) 3.4 - 5.0 g/dL    Alkaline Phosphatase 88 33 - 136 U/L    Total Protein 7.1 6.4 - 8.2 g/dL    AST 50 (H) 9 - 39 U/L    Bilirubin, Total 1.0 0.0 - 1.2 mg/dL    ALT 24 10 - 52 U/L   Troponin, High Sensitivity, 1 Hour   Result Value Ref Range    Troponin I, High Sensitivity 166 (HH) 0 - 20 ng/L   Prepare RBC: 1 Units   Result Value Ref Range    PRODUCT CODE V4940K27     Unit Number [id number]     Unit ABO O     Unit RH POS     XM INTEP COMP     Dispense Status XM     Blood Expiration Date November 07, 2023 23:59 EST     PRODUCT BLOOD TYPE 5100     UNIT VOLUME 400       No results found.    Medications  amiodarone, 200 mg, oral, Daily  aspirin, 81 mg, oral, Daily  empagliflozin, 10 mg, oral, Daily  furosemide, 20 mg, intravenous, Once  guaiFENesin, 600 mg, oral, BID  levothyroxine, 25 mcg, oral, Daily  pantoprazole, 40 mg, intravenous, Daily  polyethylene glycol, 17 g, oral, Daily  traMADol, 50 mg, oral, BID       PRN medications: acetaminophen, HYDROcodone-acetaminophen, ondansetron, zolpidem     Assessment/Plan   Principal Problem:    GI bleeding      He is admitted with maroon stool, he is known to have a history of recurrent GI bleeding, history of colon cancer, colostomy, history of ulcerative colitis, and seeing the gastroenterologist, had a recent endoscopy colonoscopy done, he will be getting a capsule endoscopy tomorrow, his Eliquis is on hold, will plan to need to hold the aspirin,.  Acute anemia, he was transfused, he will get another unit of blood today.  History of coronary artery disease, CABG, hypertension, will  continue with the rest of the meds.  He is known to have history of CHF, for now he is off the IV Lasix.  BPH continue with the same.  DVT prophylaxis  Discussed with the patient and the wife at the bedside         I spent 75 minutes in the professional and overall care of this patient.    Timothy Calderon MD

## 2023-10-08 NOTE — CONSULTS
Inpatient consult to Cardiology  Consult performed by: Sean Cervantes MD  Consult ordered by: Timothy Calderon MD  Reason for consult: chf        History Of Present Illness:    Michael Duran is a 83 y.o. male presenting with recurrent GI bleeding, dyspnea, generalized fatigue, and lower extremity edema.  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  He has had multiple hospitalizations in the last year and was recently at [facility] 2 weeks ago with similar symptoms.  He reports bloody stools in addition to increasing dyspnea on exertion and lower extremity edema.  He was discharged approximately 10 days ago from [facility].  He denies any chest discomfort.  The patient denies chest pain, palpitations, lightheadedness, syncope, orthopnea, paroxysmal nocturnal dyspnea.    Past medical history as above.  He has a history of remote CABG with a LIMA to LAD and a radial to OM.  His last PCI was in May 2022 for severe ISR in the RCA.  He was initially on apixaban and clopidogrel.  Clopidogrel was switched to aspirin.  Due to recurrent bleeding his antiplatelet was stopped and he was maintained on apixaban.      Last Recorded Vitals:  Vitals:    10/07/23 2300 10/08/23 0000 10/08/23 0500 10/08/23 0848   BP: 94/57 102/68 92/50 105/55   BP Location:       Patient Position: Sitting  Lying    Pulse: 80 80 64 73   Resp: 18 18 18 18   Temp: 36.2 °C (97.2 °F) 36.2 °C (97.2 °F) 35.8 °C (96.4 °F) 36.3 °C (97.3 °F)   TempSrc: Temporal Temporal Temporal    SpO2: 92% 92% (!)  86% 92%   Weight:       Height:           Last Labs:  CBC - 10/8/2023:  5:50 AM  4.2 7.3 94    24.6      CMP - 10/8/2023:  5:50 AM  8.6 7.1 50 --- 1.0   3.8 3.1 24 88      PTT - 9/13/2023:  4:38 PM  1.4   15.9 37     Troponin I, High Sensitivity   Date/Time Value Ref Range Status   10/08/2023 06:21  (HH) 0 - 20 ng/L Final     Comment:     Previous result verified on 10/7/2023 1215 on specimen/case [id number] called with component TRPHS for procedure Troponin I, High Sensitivity with value 166 ng/L.   10/08/2023 05:50  (HH) 0 - 20 ng/L Final     Comment:     Previous result verified on 10/7/2023 1215 on specimen/case [id number] called with component TRPHS for procedure Troponin I, High Sensitivity with value 166 ng/L.   10/08/2023 05:50  (HH) 0 - 20 ng/L Final     Comment:     Previous result verified on 10/7/2023 1215 on specimen/case [id number] called with component TRPHS for procedure Troponin I, High Sensitivity with value 166 ng/L.     BNP   Date/Time Value Ref Range Status   10/07/2023 11:32  (H) 0 - 99 pg/mL Final   10/05/2023 09:45 AM 1,311 (H) 0 - 99 pg/mL Final     Hemoglobin A1C   Date/Time Value Ref Range Status   06/04/2023 09:44 AM 4.7 4.3 - 5.6 % Final     Comment:     American Diabetes Association guidelines indicate that patients with HgbA1c in the range 5.7-6.4% are at increased risk for development of diabetes, and intervention by lifestyle modification may be beneficial. HgbA1c greater or equal to 6.5% is considered diagnostic of diabetes.   03/10/2022 10:17 AM 6.1 (A) % Final     Comment:          Diagnosis of Diabetes-Adults   Non-Diabetic: < or = 5.6%   Increased risk for developing diabetes: 5.7-6.4%   Diagnostic of diabetes: > or = 6.5%  .       Monitoring of Diabetes                Age (y)     Therapeutic Goal (%)   Adults:          >18           <7.0   Pediatrics:    13-18           <7.5                   7-12           <8.0                   0- 6             7.5-8.5   American Diabetes Association. Diabetes Care 33(S1), Jan 2010.       VLDL   Date/Time Value Ref Range Status   03/29/2023 06:16 PM 21 0 - 40 mg/dL Final   03/29/2023 03:41 PM CANCELED       Comment:     Result canceled by the ancillary.   10/24/2022 07:03 AM 24 0 - 40 mg/dL Final        LABS:  CMP:  Results from last 7 days   Lab Units 10/08/23  0550 10/07/23  1325 10/07/23  1132 10/05/23  0945   SODIUM mmol/L 132*  --  133* 131*   POTASSIUM mmol/L 3.7 3.8 6.5* 5.0   CHLORIDE mmol/L 97*  --  96* 93*   CO2 mmol/L 25  --  28 25   ANION GAP mmol/L 14  --  16 18   BUN mg/dL 60*  --  70* 73*   CREATININE mg/dL 2.39*  --  2.62* 2.63*   EGFR mL/min/1.73m*2 26*  --  24* 23*   MAGNESIUM mg/dL  --   --   --  2.31   ALBUMIN g/dL 3.1*  --  3.1* 3.2*   ALT U/L 24  --  28 26   AST U/L 50*  --  89* 53*   BILIRUBIN TOTAL mg/dL 1.0  --  0.8 1.0     CBC:  Results from last 7 days   Lab Units 10/08/23  0550 10/07/23  1132 10/06/23  0001 10/05/23  0945   WBC AUTO x10*3/uL 4.2* 3.8* 4.0* 5.8   HEMOGLOBIN g/dL 7.3* 6.1* 6.7* 7.3*   HEMATOCRIT % 24.6* 20.3* 21.9* 23.5*   PLATELETS AUTO x10*3/uL 94* 99* 108* 118*   MCV fL 104* 102* 100 98     COAG:     ABO:   ABO TYPE   Date Value Ref Range Status   10/08/2023 O  Final     HEME/ENDO:     CARDIAC:   Results from last 7 days   Lab Units 10/08/23  0621 10/08/23  0550 10/07/23  1325 10/07/23  1132 10/05/23  1346 10/05/23  0945   TROPHS ng/L 166* 156*  156* 170* 166* 145* 133*   BNP pg/mL  --   --   --  674*  --  1,311*       Last I/O:  I/O last 3 completed shifts:  In: 293.3 (3.6 mL/kg) [Blood:293.3]  Out: - (0 mL/kg)   Weight: 82 kg     Past Cardiology Tests (Last 3 Years):  EKG:  I reviewed an electrocardiogram from yesterday that showed AV sequential pacing with a PAC    Echo:  An echocardiogram from April 26, 2023  CONCLUSIONS:  1. Left ventricular systolic function is mildly decreased with a 45% estimated ejection fraction.  2. Spectral Doppler shows a restrictive pattern  of left ventricular diastolic filling.  3. There is severe concentric left ventricular hypertrophy.  4. There is low normal right ventricular systolic function.  5. The left atrium is severely dilated.  6. The right atrium is moderately to severely dilated.  7. Mild to moderate mitral valve regurgitation.  8. Moderate tricuspid regurgitation.  9. Moderately elevated right ventricular systolic pressure.  10. There is global hypokinesis of the left ventricle with minor regional variations.      Cath:  Right heart cath October 2022  CONCLUSIONS:   1. RA 10, PA 35/18/(23), PCW 17, sat 53%, CO/CI 4.5/2.3, SVR 1244 dynes, PVR 1.5 ROYAL.   2. Preserved CO/CO with elevated filling pressures.   3. EMBx x5 sent to pathology to evaluate for cardiac amyloidosis.    Left heart catheterization June 2022  CONCLUSIONS:   1. Severe native 3v CAD.   2. Widely patent LIMA to LAD.   3. Severely diseased LCx with occluded mid LCx-OM2 stent, which jails the AVG LCx stent beyond OM2 takeoff - simlar in appearance to prior angiogram in 5/22.   4. Multiple stents in the RCA with proximal ~ 30-40% stenosis, and mid-distal 30% stenosis.   5. No significant LV-AO peak to peak pullback gradient.   6. Left Ventricular end-diastolic pressure = 12.        Stress Test:  Nuclear Stress Test 2/14/2023    Cardiac Imaging:  MR cardiac morphology and function w and wo IV contrast 3/7/2023      Past Medical History:  He has a past medical history of Atherosclerosis of coronary artery bypass graft(s) without angina pectoris (07/15/2016), Atherosclerosis of coronary artery bypass graft(s), unspecified, with other forms of angina pectoris (CMS/HCC) (04/22/2019), Benign prostatic hyperplasia without lower urinary tract symptoms, Other chest pain (04/01/2016), Other chest pain (09/17/2019), Other chest pain (05/11/2018), Personal history of other diseases of the circulatory system (04/01/2016), Personal history of other diseases of the digestive system, Personal  history of other diseases of the musculoskeletal system and connective tissue, Personal history of other diseases of the musculoskeletal system and connective tissue (12/07/2018), Personal history of other diseases of urinary system (06/21/2019), Personal history of other specified conditions (04/01/2016), and Personal history of other specified conditions (07/15/2016).    Past Surgical History:  He has a past surgical history that includes Rotator cuff repair (04/01/2016); Other surgical history (04/01/2016); Coronary artery bypass graft (04/01/2016); Cataract extraction (04/01/2016); Total knee arthroplasty (04/01/2016); Tonsillectomy (04/01/2016); Adenoidectomy (04/01/2016); Other surgical history (12/09/2021); Other surgical history (12/09/2021); Ileostomy (11/19/2022); Other surgical history (10/23/2018); Other surgical history (10/22/2018); CT guided percutaneous peritoneal or retroperitoneal fluid collection drainage (10/8/2018); US guided percutaneous peritoneal or retroperitoneal fluid collection drainage (10/1/2018); and US guided abscess drain (10/1/2018).      Social History:  He has no history on file for tobacco use, alcohol use, and drug use.    Family History:  Family History   Problem Relation Name Age of Onset    Other (Cardiac disorder) Other Sibling         Allergies:  Ace inhibitors, Eptifibatide, Ezetimibe, Ezetimibe-simvastatin, Metoprolol, Pravastatin, and Rosuvastatin    Inpatient Medications:  Scheduled medications   Medication Dose Route Frequency    amiodarone  200 mg oral Daily    aspirin  81 mg oral Daily    empagliflozin  10 mg oral Daily    furosemide  20 mg intravenous Once    guaiFENesin  600 mg oral BID    levothyroxine  25 mcg oral Daily    pantoprazole  40 mg intravenous Daily    polyethylene glycol  17 g oral Daily    traMADol  50 mg oral BID     PRN medications   Medication    acetaminophen    HYDROcodone-acetaminophen    ondansetron    zolpidem     Continuous Medications    Medication Dose Last Rate     Outpatient Medications:  Current Outpatient Medications   Medication Instructions    acetaminophen (Tylenol) 325 mg tablet oral, Every 6 hours PRN    ALPRAZolam (Xanax) 0.25 mg tablet oral, Every 8 hours PRN    amiodarone (PACERONE) 200 mg, oral, Daily    apixaban (Eliquis) 2.5 mg tablet TAKE 1 TABLET BY MOUTH TWO TIMES A DAY    bumetanide (BUMEX) 2 mg, oral, Daily    carvedilol (Coreg) 3.125 mg tablet 1 tablet, oral, 2 times daily with meals    celecoxib (CeleBREX) 200 mg capsule oral    cephalexin (Keflex) 500 mg capsule     cholestyramine (Questran) 4 gram packet oral    clopidogrel (Plavix) 75 mg tablet 1 tablet, oral, Daily    diphenoxylate-atropine (Lomotil) 2.5-0.025 mg tablet TAKE 2 TABLETS BY MOUTH 4 TIMES DAILY AS NEEDED.    Eliquis 2.5 mg tablet     Eliquis 5 mg, oral, 2 times daily    Entresto 24-26 mg tablet 1 tablet, oral, 2 times daily    eplerenone (INSPRA) 25 mg, oral, Daily    fluticasone (Flonase) 50 mcg/actuation nasal spray 1 spray, Each Nostril, Daily    furosemide (Lasix) 20 mg tablet TAKE 1 TO 2 TABLETS BY MOUTH DAILY AS DIRECTED    guaiFENesin (MUCINEX) 600 mg, oral, 2 times daily    HYDROcodone-acetaminophen (Norco) 5-325 mg tablet 1 tablet, oral    Jardiance 10 mg 1 tablet, oral, Daily    Lagevrio, EUA, 200 mg capsule oral    levothyroxine (SYNTHROID, LEVOXYL) 25 mcg, oral, Daily    loperamide (Imodium) 2 mg capsule TAKE 3 CAPSULES BY MOUTH 4 TIMES DAILY    losartan (COZAAR) 12.5 mg, oral, Daily    magnesium oxide (Mag-Ox) 400 mg (241.3 mg magnesium) tablet 1 tablet, oral, Nightly    meclizine (Antivert) 12.5 mg tablet TAKE 1 TABLET 3 TIMES DAILY AS NEEDED FOR VIRTIGO/DIZZINESS.    metOLazone (Zaroxolyn) 5 mg tablet     mirtazapine (Remeron) 30 mg tablet TAKE 1 TABLET BY MOUTH EVERY DAY AT BEDTIME FOR 30 DAYS    naloxone (Narcan) 4 mg/0.1 mL nasal spray nasal    omeprazole (PriLOSEC) 40 mg DR capsule oral    pantoprazole (ProtoNix) 40 mg EC tablet TAKE 1  TABLET BY MOUTH TWO TIMES A DAY    potassium chloride CR 10 mEq ER tablet     predniSONE (DELTASONE) 20 mg, oral    rosuvastatin (CRESTOR) 5 mg, oral, Daily    sertraline (Zoloft) 50 mg tablet 1 tablet, oral, Daily    tafamidis (Vyndamax) 61 mg capsule 1 capsule, oral, Daily    tamsulosin (FLOMAX) 0.4 mg, oral, Daily    traMADol (ULTRAM) 50 mg, oral, 2 times daily    zolpidem (Ambien) 5 mg tablet TAKE 1 TABLET BY MOUTH EVERY DAY AT BEDTIME FOR 30 DAYS       Physical Exam:  General: in no acute distress  HEENT: Normocephalic atraumatic  Neck: Supple, JVP is >15 cm   Pulmonary: Normal respiratory effort, diffuse crackles b  Cardiovascular: No right ventricular heave, normal S1 and S2, 2/6 hsm LLSB and apex  Abdomen: Soft nontender nondistended  Extremities: Warm with 1+ BLE edema 2+ R radial pulse  Neurologic: Alert and oriented x3  Psychiatric: Normal mood and affect     Assessment/Plan   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  Acute myocardial injury in setting of anemia, CAD, and amyloid  CAD s/p CABG and PCI  Afib/flutter  VT s/p ICD  H/o LV thrombus  HTN  HL  CKD  Recurrent GI bleeding    Rec:  Lasix 80mg iv bid  Cont outpatient eplerenone  Aspirin 162 mg now then 81mg daily - must stay on without interruption due to multiple stents in RCA including multiple layers  Hold Eliquis - consider outpatient watchman  Replete lytes as needed  Continue tafamidis         Code Status:  Full Code            Sean Cervantes MD

## 2023-10-08 NOTE — CONSULTS
[facility] Consultation Note    ASSESSMENT and PLAN:     Michael Duran is a 83 y.o. male with a past medical history of cirrhosis with prior EV banding, CAD s/p CABG and PCI, CHB s/p PPM, HFrEF (45%), cardiac amyloidosis, Afib/Aflutter s/p DCCV on Eliquis, HTN, HLD, COPD, rectal cancer s/p TPC (2018) with ileostomy who presented with recurrent GIB and anemia.  This is a patient with recurrent GIB despite multiple attempts at endoscopic management.  Outpatient plan was for VCE so we will expedite this evaluation with plan for VCE tomorrow.  He does have PPM but current policy is that he should be fine for VCE as he is on telemetry.  Hold Eliquis and Plavix.  Ok for diet today then NPO at midnight.     Dr. Santy Ramos takes over GI consults tomorrow.    Julito Motta MD    HISTORY OF PRESENT ILLNESS:     History Of Present Illness:    Michael Duran is a 83 y.o. male who reports he has been struggling with recurrent GIB since the beginning of this year.  He notes stool is intermittently blood tinged in his ostomy bag.  He saw Dr. Jerez for this recently and was planned for outpatient VCE but this was not completed yet.  He has had extensive recent number of EGDs as noted below with management of Dieulafoy.  More remotely he has had banding of varices but these have not been noted on recent EGD.  He is continuing to intermittently see blood tinged stool.  No N/V.  No abdominal pain.  He did drop his hemoglobin slightly.     Relevant endoscopic evaluation results were personally reviewed.  EGD 9/2023  Impression:            - Z-line regular, 38 cm from the incisors.                         - Dieulafoy lesion of stomach.                         - Gastric antral vascular ectasia with bleeding.                          Treated with argon plasma coagulation (APC).                         - Normal duodenal bulb, first portion of the duodenum                          and second portion of the  duodenum.                         - No specimens collected.    Review of systems:   Review of Systems   Constitutional:  Positive for fatigue.   Gastrointestinal:  Positive for blood in stool.   Neurological:  Positive for weakness.   All other systems reviewed and are negative.       A 10+ point ROS was completed and otherwise negative except as noted per HPI.    PAST HISTORIES:     Past Medical History:  Past Medical History:   Diagnosis Date    Atherosclerosis of coronary artery bypass graft(s) without angina pectoris 07/15/2016    Atherosclerosis of coronary artery bypass graft(s) without angina pectoris    Atherosclerosis of coronary artery bypass graft(s), unspecified, with other forms of angina pectoris (CMS/HCC) 04/22/2019    Coronary artery disease of bypass graft of native heart with stable angina pectoris    Benign prostatic hyperplasia without lower urinary tract symptoms     BPH (benign prostatic hyperplasia)    Other chest pain 04/01/2016    Chest discomfort    Other chest pain 09/17/2019    Atypical chest pain    Other chest pain 05/11/2018    Atypical chest pain    Personal history of other diseases of the circulatory system 04/01/2016    History of angina pectoris    Personal history of other diseases of the digestive system     History of ulcerative colitis    Personal history of other diseases of the musculoskeletal system and connective tissue     History of osteoarthritis    Personal history of other diseases of the musculoskeletal system and connective tissue 12/07/2018    History of chronic back pain    Personal history of other diseases of urinary system 06/21/2019    History of hematuria    Personal history of other specified conditions 04/01/2016    History of chest pain    Personal history of other specified conditions 07/15/2016    History of precordial chest pain       Past Surgical History:  Past Surgical History:   Procedure Laterality Date    ADENOIDECTOMY  04/01/2016    Adenoidectomy     CATARACT EXTRACTION  04/01/2016    Cataract Surgery    CORONARY ARTERY BYPASS GRAFT  04/01/2016    CABG    CT GUIDED PERCUTANEOUS PERITONEAL OR RETROPERITONEAL FLUID COLLECTION DRAINAGE  10/8/2018    CT GUIDED PERCUTANEOUS PERITONEAL OR RETROPERITONEAL FLUID COLLECTION DRAINAGE 10/8/2018 [facility]GAC[facility]    ILEOSTOMY  11/19/2022    Ileostomy    OTHER SURGICAL HISTORY  04/01/2016    Cath Atherectomy 3 Left Internal Mammary Graft    OTHER SURGICAL HISTORY  12/09/2021    Pacemaker insertion    OTHER SURGICAL HISTORY  12/09/2021    Cardioversion    OTHER SURGICAL HISTORY  10/23/2018    Total Abdominal Colectomy    OTHER SURGICAL HISTORY  10/22/2018    Complete Proctectomy Combined APR W/ Colostomy Laparoscopic    ROTATOR CUFF REPAIR  04/01/2016    Rotator Cuff Repair    TONSILLECTOMY  04/01/2016    Tonsillectomy    TOTAL KNEE ARTHROPLASTY  04/01/2016    Knee Replacement    US GUIDED ABSCESS DRAIN  10/1/2018    US GUIDED ABSCESS DRAIN 10/1/2018 [facility] CLINICAL LEGACY    US GUIDED PERCUTANEOUS PERITONEAL OR RETROPERITONEAL FLUID COLLECTION DRAINAGE  10/1/2018    US GUIDED PERCUTANEOUS PERITONEAL OR RETROPERITONEAL FLUID COLLECTION DRAINAGE 10/1/2018 [facility] CLINICAL LEGAC[facility]        Family History:  Family History   Problem Relation Name Age of Onset    Other (Cardiac disorder) Other Sibling         Social History:   has no history on file for tobacco use, alcohol use, and drug use.    OBJECTIVE:     Last Recorded Vitals:  Vitals:    10/07/23 2300 10/08/23 0000 10/08/23 0500 10/08/23 0848   BP: 94/57 102/68 92/50 105/55   BP Location:       Patient Position: Sitting  Lying    Pulse: 80 80 64 73   Resp: 18 18 18 18   Temp: 36.2 °C (97.2 °F) 36.2 °C (97.2 °F) 35.8 °C (96.4 °F) 36.3 °C (97.3 °F)   TempSrc: Temporal Temporal Temporal    SpO2: 92% 92% (!) 86% 92%   Weight:       Height:           Physical Exam:  CONSTITUTIONAL: no acute distress, appears stated age  EYES: anicteric sclera, sclera clear  HEAD: normocephalic,  "atraumatic   NECK: supple   PULMONARY: clear to auscultation bilaterally, no increased work of breathing   CARDIOVASCULAR: regular rate and rhythm, no murmurs/rubs/gallops appreciated  ABDOMEN: soft, non-tender, ostomy present with small amount of blood tinged stool  MUSCULOSKELETAL: no edema  SKIN: no jaundice   PSYCHIATRIC: alert and oriented to person/place/time, appropriate insight and judgement     Allergies:  Allergies   Allergen Reactions    Ace Inhibitors Unknown     oesnst likee the way it makes him feel    Eptifibatide Unknown     Reaction from Community: Thrombocytopen.    doesnt like the way it makes him feel    Ezetimibe Unknown     doesnt like the wayi t makes him feel    Reaction from Community: Myalgia    Ezetimibe-Simvastatin Unknown     doesnt like the way it mkes him feel    Reaction from Community: Headache    Metoprolol Unknown     doesnt like the way it makes him feel    Pravastatin Unknown     Reaction from Community: Myalgia    doesnt like the way it makes him feel    Other reaction(s): Unknown    Rosuvastatin Other and Unknown     \"doenst like the way i feel\"    Reaction from Community: Myalgia    Sluggish feeling    Other reaction(s): Other: See Comments, Unknown       Inpatient Medications:  amiodarone, 200 mg, oral, Daily  aspirin, 81 mg, oral, Daily  empagliflozin, 10 mg, oral, Daily  furosemide, 20 mg, intravenous, Once  furosemide, 80 mg, intravenous, Once  guaiFENesin, 600 mg, oral, BID  levothyroxine, 25 mcg, oral, Daily  pantoprazole, 40 mg, intravenous, Daily  polyethylene glycol, 17 g, oral, Daily  traMADol, 50 mg, oral, BID         PRN medications: acetaminophen, HYDROcodone-acetaminophen, ondansetron, zolpidem    Outpatient Medications:  Prior to Admission medications    Medication Sig Start Date End Date Taking? Authorizing Provider   acetaminophen (Tylenol) 325 mg tablet Take by mouth every 6 hours if needed. 10/26/22   Historical Provider, MD   ALPRAZolam (Xanax) 0.25 mg " tablet Take by mouth every 8 hours if needed.    Historical Provider, MD   amiodarone (Pacerone) 200 mg tablet Take 1 tablet (200 mg) by mouth once daily.    Historical Provider, MD   apixaban (Eliquis) 2.5 mg tablet TAKE 1 TABLET BY MOUTH TWO TIMES A DAY 9/2/23 9/1/24  Marina Alvarez MD   bumetanide (Bumex) 2 mg tablet Take 1 tablet (2 mg) by mouth once daily.    Historical Provider, MD   celecoxib (CeleBREX) 200 mg capsule Take by mouth. 5/29/20   Historical Provider, MD   cephalexin (Keflex) 500 mg capsule  5/1/23   Historical Provider, MD   cholestyramine (Questran) 4 gram packet Take by mouth. 8/22/22   Historical Provider, MD   clopidogrel (Plavix) 75 mg tablet Take 1 tablet (75 mg) by mouth once daily.    Historical Provider, MD   diphenoxylate-atropine (Lomotil) 2.5-0.025 mg tablet TAKE 2 TABLETS BY MOUTH 4 TIMES DAILY AS NEEDED.    Historical Provider, MD   Eliquis 2.5 mg tablet  5/1/23   Historical Provider, MD   Eliquis 5 mg tablet Take 1 tablet (5 mg) by mouth 2 times a day.    Historical Provider, MD   Entresto 24-26 mg tablet Take 1 tablet by mouth 2 times a day. 8/5/22   Historical Provider, MD   eplerenone (Inspra) 25 mg tablet Take 1 tablet (25 mg) by mouth once daily.    Historical Provider, MD   fluticasone (Flonase) 50 mcg/actuation nasal spray Administer 1 spray into each nostril once daily.    Historical Provider, MD   furosemide (Lasix) 20 mg tablet TAKE 1 TO 2 TABLETS BY MOUTH DAILY AS DIRECTED 5/31/22   Historical Provider, MD   guaiFENesin (Mucinex) 600 mg 12 hr tablet Take 1 tablet (600 mg) by mouth 2 times a day.    Historical Provider, MD   HYDROcodone-acetaminophen (Norco) 5-325 mg tablet Take 1 tablet by mouth.    Historical Provider, MD   Jardiance 10 mg Take 1 tablet (10 mg) by mouth once daily. 8/30/22   Historical Provider, MD   Lagevrio, EUA, 200 mg capsule Take by mouth. 8/28/22   Historical Provider, MD   levothyroxine (Synthroid, Levoxyl) 25 mcg tablet Take 1 tablet (25 mcg)  by mouth once daily.    Historical Provider, MD   loperamide (Imodium) 2 mg capsule TAKE 3 CAPSULES BY MOUTH 4 TIMES DAILY    Historical Provider, MD   losartan (Cozaar) 25 mg tablet Take 0.5 tablets (12.5 mg) by mouth once daily.    Historical Provider, MD   magnesium oxide (Mag-Ox) 400 mg (241.3 mg magnesium) tablet Take 1 tablet (400 mg) by mouth once daily at bedtime. 5/29/22   Historical Provider, MD   meclizine (Antivert) 12.5 mg tablet TAKE 1 TABLET 3 TIMES DAILY AS NEEDED FOR VIRTIGO/DIZZINESS. 7/14/22   Historical Provider, MD   metOLazone (Zaroxolyn) 5 mg tablet  5/1/23   Historical Provider, MD   mirtazapine (Remeron) 30 mg tablet TAKE 1 TABLET BY MOUTH EVERY DAY AT BEDTIME FOR 30 DAYS    Historical Provider, MD   naloxone (Narcan) 4 mg/0.1 mL nasal spray Administer into affected nostril(s). 9/8/21   Historical Provider, MD   omeprazole (PriLOSEC) 40 mg DR capsule Take by mouth. 2/23/18   Historical Provider, MD   pantoprazole (ProtoNix) 40 mg EC tablet TAKE 1 TABLET BY MOUTH TWO TIMES A DAY 9/2/23 9/1/24  Marina Goodrichamin, MD   potassium chloride CR 10 mEq ER tablet  5/1/23   Historical Provider, MD   predniSONE (Deltasone) 20 mg tablet Take 1 tablet (20 mg) by mouth.    Historical Provider, MD   rosuvastatin (Crestor) 5 mg tablet Take 1 tablet (5 mg) by mouth once daily.    Historical Provider, MD   sertraline (Zoloft) 50 mg tablet Take 1 tablet (50 mg) by mouth once daily.    Historical Provider, MD   tafamidis (Vyndamax) 61 mg capsule Take 1 capsule (61 mg) by mouth once daily. 9/8/22   Historical Provider, MD   tamsulosin (Flomax) 0.4 mg 24 hr capsule Take 1 capsule (0.4 mg) by mouth once daily.    Historical Provider, MD   traMADol (Ultram) 50 mg tablet Take 1 tablet (50 mg) by mouth 2 times a day.    Historical Provider, MD   zolpidem (Ambien) 5 mg tablet TAKE 1 TABLET BY MOUTH EVERY DAY AT BEDTIME FOR 30 DAYS    Historical Provider, MD       LABS AND IMAGING:     Labs:  Results for orders placed or  performed during the hospital encounter of 10/07/23 (from the past 24 hour(s))   CBC and Auto Differential   Result Value Ref Range    WBC 3.8 (L) 4.4 - 11.3 x10*3/uL    nRBC 0.0 0.0 - 0.0 /100 WBCs    RBC 1.99 (L) 4.50 - 5.90 x10*6/uL    Hemoglobin 6.1 (LL) 13.5 - 17.5 g/dL    Hematocrit 20.3 (L) 41.0 - 52.0 %     (H) 80 - 100 fL    MCH 30.7 26.0 - 34.0 pg    MCHC 30.0 (L) 32.0 - 36.0 g/dL    RDW 21.9 (H) 11.5 - 14.5 %    Platelets 99 (L) 150 - 450 x10*3/uL    MPV 10.1 7.5 - 11.5 fL    Neutrophils % 65.3 40.0 - 80.0 %    Immature Granulocytes %, Automated 1.0 (H) 0.0 - 0.9 %    Lymphocytes % 18.8 13.0 - 44.0 %    Monocytes % 11.5 2.0 - 10.0 %    Eosinophils % 3.1 0.0 - 6.0 %    Basophils % 0.3 0.0 - 2.0 %    Neutrophils Absolute 2.50 1.60 - 5.50 x10*3/uL    Immature Granulocytes Absolute, Automated 0.04 0.00 - 0.50 x10*3/uL    Lymphocytes Absolute 0.72 (L) 0.80 - 3.00 x10*3/uL    Monocytes Absolute 0.44 0.05 - 0.80 x10*3/uL    Eosinophils Absolute 0.12 0.00 - 0.40 x10*3/uL    Basophils Absolute 0.01 0.00 - 0.10 x10*3/uL   Comprehensive metabolic panel   Result Value Ref Range    Glucose 103 (H) 74 - 99 mg/dL    Sodium 133 (L) 136 - 145 mmol/L    Potassium 6.5 (HH) 3.5 - 5.3 mmol/L    Chloride 96 (L) 98 - 107 mmol/L    Bicarbonate 28 21 - 32 mmol/L    Anion Gap 16 10 - 20 mmol/L    Urea Nitrogen 70 (H) 6 - 23 mg/dL    Creatinine 2.62 (H) 0.50 - 1.30 mg/dL    eGFR 24 (L) >60 mL/min/1.73m*2    Calcium 8.9 8.6 - 10.3 mg/dL    Albumin 3.1 (L) 3.4 - 5.0 g/dL    Alkaline Phosphatase 90 33 - 136 U/L    Total Protein 7.0 6.4 - 8.2 g/dL    AST 89 (H) 9 - 39 U/L    Bilirubin, Total 0.8 0.0 - 1.2 mg/dL    ALT 28 10 - 52 U/L   Troponin I, High Sensitivity   Result Value Ref Range    Troponin I, High Sensitivity 166 (HH) 0 - 20 ng/L   B-Type Natriuretic Peptide   Result Value Ref Range     (H) 0 - 99 pg/mL   Type And Screen   Result Value Ref Range    ABO TYPE O     Rh TYPE POS     ANTIBODY SCREEN NEG    Lactate    Result Value Ref Range    Lactate 1.7 0.4 - 2.0 mmol/L   Morphology   Result Value Ref Range    RBC Morphology See Below     Polychromasia Mild     Ovalocytes Few    Urinalysis with Reflex Microscopic and Culture   Result Value Ref Range    Color, Urine Yellow Straw, Yellow    Appearance, Urine Clear Clear    Specific Gravity, Urine 1.011 1.005 - 1.035    pH, Urine 6.0 5.0, 5.5, 6.0, 6.5, 7.0, 7.5, 8.0    Protein, Urine NEGATIVE NEGATIVE mg/dL    Glucose, Urine 150 (2+) (A) NEGATIVE mg/dL    Blood, Urine NEGATIVE NEGATIVE    Ketones, Urine NEGATIVE NEGATIVE mg/dL    Bilirubin, Urine NEGATIVE NEGATIVE    Urobilinogen, Urine <2.0 <2.0 mg/dL    Nitrite, Urine NEGATIVE NEGATIVE    Leukocyte Esterase, Urine NEGATIVE NEGATIVE   SARS-CoV-2 RT PCR   Result Value Ref Range    Coronavirus 2019, PCR Not Detected Not Detected   Potassium   Result Value Ref Range    Potassium 3.8 3.5 - 5.3 mmol/L   Troponin I, High Sensitivity   Result Value Ref Range    Troponin I, High Sensitivity 170 (HH) 0 - 20 ng/L   Prepare RBC: 1 Units   Result Value Ref Range    PRODUCT CODE P3758X99     Unit Number [id number]     Unit ABO O     Unit RH POS     XM INTEP COMP     Dispense Status TR     Blood Expiration Date October 28, 2023 23:59 EDT     PRODUCT BLOOD TYPE 5100     UNIT VOLUME 400    Type And Screen   Result Value Ref Range    ABO TYPE O     Rh TYPE POS     ANTIBODY SCREEN NEG    Troponin I, High Sensitivity   Result Value Ref Range    Troponin I, High Sensitivity 156 (HH) 0 - 20 ng/L   Troponin I, High Sensitivity, Initial   Result Value Ref Range    Troponin I, High Sensitivity 156 (HH) 0 - 20 ng/L   CBC   Result Value Ref Range    WBC 4.2 (L) 4.4 - 11.3 x10*3/uL    nRBC 0.0 0.0 - 0.0 /100 WBCs    RBC 2.37 (L) 4.50 - 5.90 x10*6/uL    Hemoglobin 7.3 (L) 13.5 - 17.5 g/dL    Hematocrit 24.6 (L) 41.0 - 52.0 %     (H) 80 - 100 fL    MCH 30.8 26.0 - 34.0 pg    MCHC 29.7 (L) 32.0 - 36.0 g/dL    RDW 22.3 (H) 11.5 - 14.5 %     Platelets 94 (L) 150 - 450 x10*3/uL    MPV 10.3 7.5 - 11.5 fL   Comprehensive metabolic panel   Result Value Ref Range    Glucose 112 (H) 74 - 99 mg/dL    Sodium 132 (L) 136 - 145 mmol/L    Potassium 3.7 3.5 - 5.3 mmol/L    Chloride 97 (L) 98 - 107 mmol/L    Bicarbonate 25 21 - 32 mmol/L    Anion Gap 14 10 - 20 mmol/L    Urea Nitrogen 60 (H) 6 - 23 mg/dL    Creatinine 2.39 (H) 0.50 - 1.30 mg/dL    eGFR 26 (L) >60 mL/min/1.73m*2    Calcium 8.6 8.6 - 10.3 mg/dL    Albumin 3.1 (L) 3.4 - 5.0 g/dL    Alkaline Phosphatase 88 33 - 136 U/L    Total Protein 7.1 6.4 - 8.2 g/dL    AST 50 (H) 9 - 39 U/L    Bilirubin, Total 1.0 0.0 - 1.2 mg/dL    ALT 24 10 - 52 U/L   Troponin, High Sensitivity, 1 Hour   Result Value Ref Range    Troponin I, High Sensitivity 166 (HH) 0 - 20 ng/L   Prepare RBC: 1 Units   Result Value Ref Range    PRODUCT CODE [id number]     Unit Number [id number]     Unit ABO O     Unit RH POS     XM INTEP COMP     Dispense Status XM     Blood Expiration Date November 07, 2023 23:59 EST     PRODUCT BLOOD TYPE 5100     UNIT VOLUME 400         Relevant imaging results were personally reviewed.  CT 8/2023   IMPRESSION:  1.No bowel obstruction, free fluid, or free air.  2.  Nodular, possibly cirrhotic configuration of the liver with mild  splenomegaly increased from the prior.  3. 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  4.  Mild pelvic and mesenteric reticulations new from the prior, these  are nonspecific, inflammatory change is a possibility.

## 2023-10-09 NOTE — PROGRESS NOTES
Subjective Data:  States lower extremity edema is improving.   S/p capsule study this am  --> Completed this PM during attending Rounds  -- Receiving Blood Transufusion on attending rounds   I/O inaccurate      Objective Data:  Last Recorded Vitals:  Vitals:    10/08/23 2100 10/09/23 0500 10/09/23 0813 10/09/23 1100   BP: 112/67 114/66 106/61 107/52   BP Location: Left arm Left arm  Left arm   Patient Position: Lying Lying Sitting Sitting   Pulse: 72 80 81 72   Resp: 18 18 18 17   Temp: 36.2 °C (97.2 °F) 36.4 °C (97.5 °F) 36.1 °C (97 °F) 36.3 °C (97.4 °F)   TempSrc: Temporal   Oral   SpO2: 95% 92% 92% 93%   Weight:       Height:         Results from last 7 days   Lab Units 10/09/23  0636 10/08/23  0550 10/07/23  1325 10/07/23  1132 10/05/23  0945   SODIUM mmol/L 132* 132*  --  133* 131*   POTASSIUM mmol/L 3.5 3.7 3.8 6.5* 5.0   CHLORIDE mmol/L 96* 97*  --  96* 93*   CO2 mmol/L 27 25  --  28 25   BUN mg/dL 51* 60*  --  70* 73*   CREATININE mg/dL 2.24* 2.39*  --  2.62* 2.63*   CALCIUM mg/dL 8.2* 8.6  --  8.9 10.1   PROTEIN TOTAL g/dL  --  7.1  --  7.0 7.6   BILIRUBIN TOTAL mg/dL  --  1.0  --  0.8 1.0   ALK PHOS U/L  --  88  --  90 122   ALT U/L  --  24  --  28 26   AST U/L  --  50*  --  89* 53*   GLUCOSE mg/dL 78 112*  --  103* 137*     Results from last 7 days   Lab Units 10/09/23  0636 10/08/23  0550 10/07/23  1132   WBC AUTO x10*3/uL 3.7* 4.2* 3.8*   HEMOGLOBIN g/dL 7.0* 7.3* 6.1*   HEMATOCRIT % 22.7* 24.6* 20.3*   PLATELETS AUTO x10*3/uL 81* 94* 99*           TROPHS   Date/Time Value Ref Range Status   10/08/2023 06:21  0 - 20 ng/L Final     Comment:     Previous result verified on 10/7/2023 1215 on specimen/case [id number] called with component TRPHS for procedure Troponin I, High Sensitivity with value 166 ng/L.   10/08/2023 05:50  0 - 20 ng/L Final     Comment:     Previous result verified on 10/7/2023 1215 on specimen/case [id number] called with component TRPHS for procedure Troponin I,  High Sensitivity with value 166 ng/L.   10/08/2023 05:50  0 - 20 ng/L Final     Comment:     Previous result verified on 10/7/2023 1215 on specimen/case [id number] called with component [facility] for procedure Troponin I, High Sensitivity with value 166 ng/L.     BNP   Date/Time Value Ref Range Status   10/07/2023 11:32  0 - 99 pg/mL Final   10/05/2023 09:45 AM 1,311 0 - 99 pg/mL Final     HGBA1C   Date/Time Value Ref Range Status   06/04/2023 09:44 AM 4.7 4.3 - 5.6 % Final     Comment:     American Diabetes Association guidelines indicate that patients with HgbA1c in the range 5.7-6.4% are at increased risk for development of diabetes, and intervention by lifestyle modification may be beneficial. HgbA1c greater or equal to 6.5% is considered diagnostic of diabetes.   03/10/2022 10:17 AM 6.1 % Final     Comment:          Diagnosis of Diabetes-Adults   Non-Diabetic: < or = 5.6%   Increased risk for developing diabetes: 5.7-6.4%   Diagnostic of diabetes: > or = 6.5%  .       Monitoring of Diabetes                Age (y)     Therapeutic Goal (%)   Adults:          >18           <7.0   Pediatrics:    13-18           <7.5                   7-12           <8.0                   0- 6            7.5-8.5   American Diabetes Association. Diabetes Care 33(S1), Jan 2010.       VLDL   Date/Time Value Ref Range Status   03/29/2023 06:16 PM 21 0 - 40 mg/dL Final   03/29/2023 03:41 PM CANCELED       Comment:     Result canceled by the ancillary.   10/24/2022 07:03 AM 24 0 - 40 mg/dL Final      Last I/O:  I/O last 3 completed shifts:  In: 599.5 (7.3 mL/kg) [Blood:599.5]  Out: - (0 mL/kg)   Weight: 82 kg       Intake/Output Summary (Last 24 hours) at 10/9/2023 1425  Last data filed at 10/8/2023 1520  Gross per 24 hour   Intake 306.15 ml   Output --   Net 306.15 ml        Past Cardiology Tests (Last 3 Years):  EKG:  No results found for this or any previous visit from the past 1095 days.    Echo:  An echocardiogram from  April 26, 2023  CONCLUSIONS:  1. Left ventricular systolic function is mildly decreased with a 45% estimated ejection fraction.  2. Spectral Doppler shows a restrictive pattern of left ventricular diastolic filling.  3. There is severe concentric left ventricular hypertrophy.  4. There is low normal right ventricular systolic function.  5. The left atrium is severely dilated.  6. The right atrium is moderately to severely dilated.  7. Mild to moderate mitral valve regurgitation.  8. Moderate tricuspid regurgitation.  9. Moderately elevated right ventricular systolic pressure.  10. There is global hypokinesis of the left ventricle with minor regional variations.        Cath:  Right heart cath October 2022  CONCLUSIONS:   1. RA 10, PA 35/18/(23), PCW 17, sat 53%, CO/CI 4.5/2.3, SVR 1244 dynes, PVR 1.5 ROYAL.   2. Preserved CO/CO with elevated filling pressures.   3. EMBx x5 sent to pathology to evaluate for cardiac amyloidosis.     Left heart catheterization June 2022  CONCLUSIONS:   1. Severe native 3v CAD.   2. Widely patent LIMA to LAD.   3. Severely diseased LCx with occluded mid LCx-OM2 stent, which jails the AVG LCx stent beyond OM2 takeoff - simlar in appearance to prior angiogram in 5/22.   4. Multiple stents in the RCA with proximal ~ 30-40% stenosis, and mid-distal 30% stenosis.   5. No significant LV-AO peak to peak pullback gradient.   6. Left Ventricular end-diastolic pressure = 12.           Stress Test:  Nuclear Stress Test 2/14/2023     Cardiac Imaging:  MR cardiac morphology and function w and wo IV contrast 3/7/2023      Inpatient Medications:  Scheduled medications   Medication Dose Route Frequency    amiodarone  200 mg oral Daily    aspirin  81 mg oral Daily    empagliflozin  10 mg oral Daily    eplerenone  25 mg oral Daily    furosemide  80 mg intravenous q12h    guaiFENesin  600 mg oral BID    levothyroxine  25 mcg oral Daily    pantoprazole  40 mg intravenous Daily    polyethylene glycol  17 g oral  Daily    tafamidis  61 mg oral Daily    traMADol  50 mg oral BID     PRN medications   Medication    acetaminophen    HYDROcodone-acetaminophen    ondansetron    zolpidem     Continuous Medications   Medication Dose Last Rate       Physical Exam:  General: in no acute distress  HEENT: Normocephalic atraumatic  Neck:+JVD   Pulmonary: Normal respiratory effort, diffuse crackles b  Cardiovascular: normal S1 and S2, 2/6 hsm   Abdomen: Soft nontender nondistended  Extremities: Warm with 1+ BLE edema 2+ R radial pulse  Neurologic: Alert and oriented x3  Psychiatric: Normal mood and affect     Assessment/Plan     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  Acute myocardial injury in setting of anemia, CAD, and amyloid  CAD s/p CABG and PCI  Afib/flutter  VT s/p ICD  H/o LV thrombus  HTN  HL  CKD  Recurrent GI bleeding     Rec:  Lasix 80mg iv bid  Cont outpatient eplerenone  Continue aspirin 81mg daily- must stay on without interruption due to multiple stents in RCA including multiple layers  Hold Eliquis - consider outpatient watchman  Continue tafamidis          Kevyn Mazariegos, SOFÍA-CNP        ========================================  Attending Note   ========================================  Both the ANTHONY and I have had a face to face encounter with the patient today. I have examined the patient and edited the documented physical examination as necessary.  I personally reviewed the patient's recent labs, medications, orders, EKGs, and pertinent cardiac imaging.  I have reviewed the ANTHONY's encounter note, approve the ANTHONY's documentation and have edited the note to reflect the diagnostic and therapeutic plan.    Patient had an uneventful night.    10 point physical examination remains unchanged.    I personally reviewed vital signs, telemetry, medications, labs, and imaging as well.    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  HF recovered EF in setting of  ICM and ATTR cardiac amyloidosis  Acute myocardial injury in setting of anemia, CAD, and amyloid  CAD s/p CABG and PCI  Afib/flutter  VT s/p ICD  H/o LV thrombus  HTN  HL  CKD  Recurrent GI bleeding     Rec:  Lasix 80mg iv bid  Cont outpatient eplerenone  Continue aspirin 81mg daily- must stay on without interruption due to multiple stents in RCA including multiple layers  Hold Eliquis - consider outpatient watchman, but would need to tolerate some form of therapy ( DAPT vs Short Term Apaixaban)   Continue tafamidis    Kevin Lee DO   Clinical Cardology   [facility]  [facility]

## 2023-10-09 NOTE — HOSPITAL COURSE
10/9/2023  Seen and examined in the morning.  His hemoglobin is down to 7 will transfuse.  He is on aspirin now off Eliquis.  He will be transfused.  He is waiting for capsule endoscopy today.  10/10/2023  He did have a capsule endoscopy yesterday.  He was transfused yesterday.  No bleeding noticed.  He is on clear liquid diet.  We will advance to full liquid diet.

## 2023-10-09 NOTE — PROGRESS NOTES
10/09/23 0889   Discharge Planning   Living Arrangements Spouse/significant other   Support Systems Family members   Assistance Needed No additional skilled needs identified at this time   Type of Residence Private residence   Number of Stairs to Enter Residence 4   Number of Stairs Within Residence 12   Who is requesting discharge planning? Other (Comment)  (Initial Assessment per Admission RN)   Home or Post Acute Services None   Patient expects to be discharged to: Home     Above Initial Assessment per Admission RN   Patient  A &O x 4.  Chart reviewed per T[facility]. Patient is independent in ADLS/ IADLS. Ambulatory without assistive devices. Patient lives alone and drives to his appointments. No issues with obtaining prescription medications. No skilled needs identified at this time. [facility] remains available for discharge needs.   Filomena HUGHES RN [facility] CCM'

## 2023-10-09 NOTE — PROGRESS NOTES
GI Daily Progress Note    Assessment/Plan:    Principal Problem:    GI bleeding    -Monitor H & H   -capsule endoscopy today, indications,risks and potential complications discussed with the patient, all questions addressed.  - advance diet per endo recommendations     LOS: 2 days     Michael Duran is a 83 y.o. male who was admitted with GI bleeding. He reports his symptoms are unchanged. Still has dark bloody stool in the ostomy    Subjective:    Patient expresses no dizziness, lightheadedness, nausea or vomiting.  Patient denies abdominal pain    Objective:    Vital signs in last 24 hours:  Temp:  [35.6 °C (96.1 °F)-36.7 °C (98.1 °F)] 36.3 °C (97.4 °F)  Heart Rate:  [68-81] 72  Resp:  [17-19] 17  BP: (100-114)/(52-67) 107/52    Intake/Output last 3 shifts:  I/O last 3 completed shifts:  In: 599.5 (7.3 mL/kg) [Blood:599.5]  Out: - (0 mL/kg)   Weight: 82 kg   Intake/Output this shift:  No intake/output data recorded.    Physical Exam  Constitutional:       Appearance: Normal appearance.   HENT:      Head: Normocephalic and atraumatic.      Nose: Nose normal.      Mouth/Throat:      Pharynx: Oropharynx is clear.   Eyes:      Conjunctiva/sclera: Conjunctivae normal.   Cardiovascular:      Rate and Rhythm: Normal rate and regular rhythm.      Heart sounds: Normal heart sounds.   Pulmonary:      Effort: Pulmonary effort is normal.      Breath sounds: Normal breath sounds.   Abdominal:      General: Bowel sounds are normal.      Palpations: Abdomen is soft.      Comments: Ostomy with dark maroon stool, small amount   Musculoskeletal:      Cervical back: Neck supple.   Neurological:      Mental Status: He is alert.          Results for orders placed or performed during the hospital encounter of 10/07/23 (from the past 24 hour(s))   Prepare RBC: 1 Units   Result Value Ref Range    PRODUCT CODE [id number]     Unit Number [id number]     Unit ABO O     Unit RH POS     XM INTEP COMP     Dispense Status TR     Blood  Expiration Date November 07, 2023 23:59 EST     PRODUCT BLOOD TYPE 5100     UNIT VOLUME 400    CBC   Result Value Ref Range    WBC 3.7 (L) 4.4 - 11.3 x10*3/uL    nRBC 0.0 0.0 - 0.0 /100 WBCs    RBC 2.27 (L) 4.50 - 5.90 x10*6/uL    Hemoglobin 7.0 (L) 13.5 - 17.5 g/dL    Hematocrit 22.7 (L) 41.0 - 52.0 %     80 - 100 fL    MCH 30.8 26.0 - 34.0 pg    MCHC 30.8 (L) 32.0 - 36.0 g/dL    RDW 21.5 (H) 11.5 - 14.5 %    Platelets 81 (L) 150 - 450 x10*3/uL    MPV 10.3 7.5 - 11.5 fL   Basic Metabolic Panel   Result Value Ref Range    Glucose 78 74 - 99 mg/dL    Sodium 132 (L) 136 - 145 mmol/L    Potassium 3.5 3.5 - 5.3 mmol/L    Chloride 96 (L) 98 - 107 mmol/L    Bicarbonate 27 21 - 32 mmol/L    Anion Gap 13 10 - 20 mmol/L    Urea Nitrogen 51 (H) 6 - 23 mg/dL    Creatinine 2.24 (H) 0.50 - 1.30 mg/dL    eGFR 28 (L) >60 mL/min/1.73m*2    Calcium 8.2 (L) 8.6 - 10.3 mg/dL      No current facility-administered medications on file prior to encounter.     Current Outpatient Medications on File Prior to Encounter   Medication Sig Dispense Refill    acetaminophen (Tylenol) 325 mg tablet Take by mouth every 6 hours if needed.      ALPRAZolam (Xanax) 0.25 mg tablet Take by mouth every 8 hours if needed.      amiodarone (Pacerone) 200 mg tablet Take 1 tablet (200 mg) by mouth once daily.      apixaban (Eliquis) 2.5 mg tablet TAKE 1 TABLET BY MOUTH TWO TIMES A DAY 60 tablet 1    bumetanide (Bumex) 2 mg tablet Take 1 tablet (2 mg) by mouth once daily.      carvedilol (Coreg) 3.125 mg tablet Take 1 tablet (3.125 mg) by mouth 2 times a day with meals.      celecoxib (CeleBREX) 200 mg capsule Take by mouth.      cephalexin (Keflex) 500 mg capsule       cholestyramine (Questran) 4 gram packet Take by mouth.      clopidogrel (Plavix) 75 mg tablet Take 1 tablet (75 mg) by mouth once daily.      diphenoxylate-atropine (Lomotil) 2.5-0.025 mg tablet TAKE 2 TABLETS BY MOUTH 4 TIMES DAILY AS NEEDED.      Eliquis 2.5 mg tablet       Eliquis 5 mg  tablet Take 1 tablet (5 mg) by mouth 2 times a day.      Entresto 24-26 mg tablet Take 1 tablet by mouth 2 times a day.      eplerenone (Inspra) 25 mg tablet Take 1 tablet (25 mg) by mouth once daily.      fluticasone (Flonase) 50 mcg/actuation nasal spray Administer 1 spray into each nostril once daily.      furosemide (Lasix) 20 mg tablet TAKE 1 TO 2 TABLETS BY MOUTH DAILY AS DIRECTED      guaiFENesin (Mucinex) 600 mg 12 hr tablet Take 1 tablet (600 mg) by mouth 2 times a day.      HYDROcodone-acetaminophen (Norco) 5-325 mg tablet Take 1 tablet by mouth.      Jardiance 10 mg Take 1 tablet (10 mg) by mouth once daily.      Lagevrio, EUA, 200 mg capsule Take by mouth.      levothyroxine (Synthroid, Levoxyl) 25 mcg tablet Take 1 tablet (25 mcg) by mouth once daily.      loperamide (Imodium) 2 mg capsule TAKE 3 CAPSULES BY MOUTH 4 TIMES DAILY      losartan (Cozaar) 25 mg tablet Take 0.5 tablets (12.5 mg) by mouth once daily.      magnesium oxide (Mag-Ox) 400 mg (241.3 mg magnesium) tablet Take 1 tablet (400 mg) by mouth once daily at bedtime.      meclizine (Antivert) 12.5 mg tablet TAKE 1 TABLET 3 TIMES DAILY AS NEEDED FOR VIRTIGO/DIZZINESS.      metOLazone (Zaroxolyn) 5 mg tablet       mirtazapine (Remeron) 30 mg tablet TAKE 1 TABLET BY MOUTH EVERY DAY AT BEDTIME FOR 30 DAYS      naloxone (Narcan) 4 mg/0.1 mL nasal spray Administer into affected nostril(s).      omeprazole (PriLOSEC) 40 mg DR capsule Take by mouth.      pantoprazole (ProtoNix) 40 mg EC tablet TAKE 1 TABLET BY MOUTH TWO TIMES A DAY 60 tablet 1    potassium chloride CR 10 mEq ER tablet       predniSONE (Deltasone) 20 mg tablet Take 1 tablet (20 mg) by mouth.      rosuvastatin (Crestor) 5 mg tablet Take 1 tablet (5 mg) by mouth once daily.      sertraline (Zoloft) 50 mg tablet Take 1 tablet (50 mg) by mouth once daily.      tafamidis (Vyndamax) 61 mg capsule Take 1 capsule (61 mg) by mouth once daily.      tamsulosin (Flomax) 0.4 mg 24 hr capsule Take  1 capsule (0.4 mg) by mouth once daily.      traMADol (Ultram) 50 mg tablet Take 1 tablet (50 mg) by mouth 2 times a day.      zolpidem (Ambien) 5 mg tablet TAKE 1 TABLET BY MOUTH EVERY DAY AT BEDTIME FOR 30 DAYS

## 2023-10-09 NOTE — CARE PLAN
The patient's goals for the shift include pt to be safe and comfortable throughout shift    The clinical goals for the shift include pt would get up and move four times through shift    Over the shift, the patient did  make progress toward the previous goals.

## 2023-10-09 NOTE — PROGRESS NOTES
"Michael Duran is a 83 y.o. male on day 2 of admission presenting with GI bleeding.    Subjective   10/9/2023  Seen and examined in the morning.  His hemoglobin is down to 7 will transfuse.  He is on aspirin now off Eliquis.  He will be transfused.  He is waiting for capsule endoscopy today.         Objective     Physical Exam  Constitutional:       Appearance: Normal appearance.   HENT:      Head: Normocephalic and atraumatic.   Cardiovascular:      Rate and Rhythm: Normal rate and regular rhythm.   Pulmonary:      Effort: Pulmonary effort is normal.      Breath sounds: Normal breath sounds.   Abdominal:      General: There is no distension.      Palpations: Abdomen is soft.      Tenderness: There is no abdominal tenderness.   Musculoskeletal:         General: Normal range of motion.      Cervical back: Neck supple.   Neurological:      General: No focal deficit present.      Mental Status: He is alert.         Last Recorded Vitals  Blood pressure 106/61, pulse 81, temperature 36.1 °C (97 °F), resp. rate 18, height 1.702 m (5' 7.01\"), weight 82 kg (180 lb 12.4 oz), SpO2 92 %.  Intake/Output last 3 Shifts:  I/O last 3 completed shifts:  In: 599.5 (7.3 mL/kg) [Blood:599.5]  Out: - (0 mL/kg)   Weight: 82 kg     Relevant Results  Results for orders placed or performed during the hospital encounter of 10/07/23 (from the past 96 hour(s))   CBC and Auto Differential   Result Value Ref Range    WBC 3.8 (L) 4.4 - 11.3 x10*3/uL    nRBC 0.0 0.0 - 0.0 /100 WBCs    RBC 1.99 (L) 4.50 - 5.90 x10*6/uL    Hemoglobin 6.1 (LL) 13.5 - 17.5 g/dL    Hematocrit 20.3 (L) 41.0 - 52.0 %     (H) 80 - 100 fL    MCH 30.7 26.0 - 34.0 pg    MCHC 30.0 (L) 32.0 - 36.0 g/dL    RDW 21.9 (H) 11.5 - 14.5 %    Platelets 99 (L) 150 - 450 x10*3/uL    MPV 10.1 7.5 - 11.5 fL    Neutrophils % 65.3 40.0 - 80.0 %    Immature Granulocytes %, Automated 1.0 (H) 0.0 - 0.9 %    Lymphocytes % 18.8 13.0 - 44.0 %    Monocytes % 11.5 2.0 - 10.0 %    " Eosinophils % 3.1 0.0 - 6.0 %    Basophils % 0.3 0.0 - 2.0 %    Neutrophils Absolute 2.50 1.60 - 5.50 x10*3/uL    Immature Granulocytes Absolute, Automated 0.04 0.00 - 0.50 x10*3/uL    Lymphocytes Absolute 0.72 (L) 0.80 - 3.00 x10*3/uL    Monocytes Absolute 0.44 0.05 - 0.80 x10*3/uL    Eosinophils Absolute 0.12 0.00 - 0.40 x10*3/uL    Basophils Absolute 0.01 0.00 - 0.10 x10*3/uL   Comprehensive metabolic panel   Result Value Ref Range    Glucose 103 (H) 74 - 99 mg/dL    Sodium 133 (L) 136 - 145 mmol/L    Potassium 6.5 (HH) 3.5 - 5.3 mmol/L    Chloride 96 (L) 98 - 107 mmol/L    Bicarbonate 28 21 - 32 mmol/L    Anion Gap 16 10 - 20 mmol/L    Urea Nitrogen 70 (H) 6 - 23 mg/dL    Creatinine 2.62 (H) 0.50 - 1.30 mg/dL    eGFR 24 (L) >60 mL/min/1.73m*2    Calcium 8.9 8.6 - 10.3 mg/dL    Albumin 3.1 (L) 3.4 - 5.0 g/dL    Alkaline Phosphatase 90 33 - 136 U/L    Total Protein 7.0 6.4 - 8.2 g/dL    AST 89 (H) 9 - 39 U/L    Bilirubin, Total 0.8 0.0 - 1.2 mg/dL    ALT 28 10 - 52 U/L   Troponin I, High Sensitivity   Result Value Ref Range    Troponin I, High Sensitivity 166 (HH) 0 - 20 ng/L   B-Type Natriuretic Peptide   Result Value Ref Range     (H) 0 - 99 pg/mL   Type And Screen   Result Value Ref Range    ABO TYPE O     Rh TYPE POS     ANTIBODY SCREEN NEG    Lactate   Result Value Ref Range    Lactate 1.7 0.4 - 2.0 mmol/L   Morphology   Result Value Ref Range    RBC Morphology See Below     Polychromasia Mild     Ovalocytes Few    Urinalysis with Reflex Microscopic and Culture   Result Value Ref Range    Color, Urine Yellow Straw, Yellow    Appearance, Urine Clear Clear    Specific Gravity, Urine 1.011 1.005 - 1.035    pH, Urine 6.0 5.0, 5.5, 6.0, 6.5, 7.0, 7.5, 8.0    Protein, Urine NEGATIVE NEGATIVE mg/dL    Glucose, Urine 150 (2+) (A) NEGATIVE mg/dL    Blood, Urine NEGATIVE NEGATIVE    Ketones, Urine NEGATIVE NEGATIVE mg/dL    Bilirubin, Urine NEGATIVE NEGATIVE    Urobilinogen, Urine <2.0 <2.0 mg/dL    Nitrite,  Urine NEGATIVE NEGATIVE    Leukocyte Esterase, Urine NEGATIVE NEGATIVE   SARS-CoV-2 RT PCR   Result Value Ref Range    Coronavirus 2019, PCR Not Detected Not Detected   Potassium   Result Value Ref Range    Potassium 3.8 3.5 - 5.3 mmol/L   Troponin I, High Sensitivity   Result Value Ref Range    Troponin I, High Sensitivity 170 (HH) 0 - 20 ng/L   Prepare RBC: 1 Units   Result Value Ref Range    PRODUCT CODE Q0085N82     Unit Number [id number]     Unit ABO O     Unit RH POS     XM INTEP COMP     Dispense Status TR     Blood Expiration Date October 28, 2023 23:59 EDT     PRODUCT BLOOD TYPE 5100     UNIT VOLUME 400    Type And Screen   Result Value Ref Range    ABO TYPE O     Rh TYPE POS     ANTIBODY SCREEN NEG    Troponin I, High Sensitivity   Result Value Ref Range    Troponin I, High Sensitivity 156 (HH) 0 - 20 ng/L   Troponin I, High Sensitivity, Initial   Result Value Ref Range    Troponin I, High Sensitivity 156 (HH) 0 - 20 ng/L   CBC   Result Value Ref Range    WBC 4.2 (L) 4.4 - 11.3 x10*3/uL    nRBC 0.0 0.0 - 0.0 /100 WBCs    RBC 2.37 (L) 4.50 - 5.90 x10*6/uL    Hemoglobin 7.3 (L) 13.5 - 17.5 g/dL    Hematocrit 24.6 (L) 41.0 - 52.0 %     (H) 80 - 100 fL    MCH 30.8 26.0 - 34.0 pg    MCHC 29.7 (L) 32.0 - 36.0 g/dL    RDW 22.3 (H) 11.5 - 14.5 %    Platelets 94 (L) 150 - 450 x10*3/uL    MPV 10.3 7.5 - 11.5 fL   Comprehensive metabolic panel   Result Value Ref Range    Glucose 112 (H) 74 - 99 mg/dL    Sodium 132 (L) 136 - 145 mmol/L    Potassium 3.7 3.5 - 5.3 mmol/L    Chloride 97 (L) 98 - 107 mmol/L    Bicarbonate 25 21 - 32 mmol/L    Anion Gap 14 10 - 20 mmol/L    Urea Nitrogen 60 (H) 6 - 23 mg/dL    Creatinine 2.39 (H) 0.50 - 1.30 mg/dL    eGFR 26 (L) >60 mL/min/1.73m*2    Calcium 8.6 8.6 - 10.3 mg/dL    Albumin 3.1 (L) 3.4 - 5.0 g/dL    Alkaline Phosphatase 88 33 - 136 U/L    Total Protein 7.1 6.4 - 8.2 g/dL    AST 50 (H) 9 - 39 U/L    Bilirubin, Total 1.0 0.0 - 1.2 mg/dL    ALT 24 10 - 52 U/L    Troponin, High Sensitivity, 1 Hour   Result Value Ref Range    Troponin I, High Sensitivity 166 (HH) 0 - 20 ng/L   Occult Blood, Stool    Specimen: Stool   Result Value Ref Range    Occult Blood, Stool X1 Positive (A) Negative   Prepare RBC: 1 Units   Result Value Ref Range    PRODUCT CODE [id number]     Unit Number [id number]     Unit ABO O     Unit RH POS     XM INTEP COMP     Dispense Status TR     Blood Expiration Date November 07, 2023 23:59 EST     PRODUCT BLOOD TYPE 5100     UNIT VOLUME 400    CBC   Result Value Ref Range    WBC 3.7 (L) 4.4 - 11.3 x10*3/uL    nRBC 0.0 0.0 - 0.0 /100 WBCs    RBC 2.27 (L) 4.50 - 5.90 x10*6/uL    Hemoglobin 7.0 (L) 13.5 - 17.5 g/dL    Hematocrit 22.7 (L) 41.0 - 52.0 %     80 - 100 fL    MCH 30.8 26.0 - 34.0 pg    MCHC 30.8 (L) 32.0 - 36.0 g/dL    RDW 21.5 (H) 11.5 - 14.5 %    Platelets 81 (L) 150 - 450 x10*3/uL    MPV 10.3 7.5 - 11.5 fL   Basic Metabolic Panel   Result Value Ref Range    Glucose 78 74 - 99 mg/dL    Sodium 132 (L) 136 - 145 mmol/L    Potassium 3.5 3.5 - 5.3 mmol/L    Chloride 96 (L) 98 - 107 mmol/L    Bicarbonate 27 21 - 32 mmol/L    Anion Gap 13 10 - 20 mmol/L    Urea Nitrogen 51 (H) 6 - 23 mg/dL    Creatinine 2.24 (H) 0.50 - 1.30 mg/dL    eGFR 28 (L) >60 mL/min/1.73m*2    Calcium 8.2 (L) 8.6 - 10.3 mg/dL        Medications:  amiodarone, 200 mg, oral, Daily  aspirin, 81 mg, oral, Daily  empagliflozin, 10 mg, oral, Daily  eplerenone, 25 mg, oral, Daily  furosemide, 80 mg, intravenous, q12h  guaiFENesin, 600 mg, oral, BID  levothyroxine, 25 mcg, oral, Daily  pantoprazole, 40 mg, intravenous, Daily  polyethylene glycol, 17 g, oral, Daily  tafamidis, 61 mg, oral, Daily  traMADol, 50 mg, oral, BID      PRN medications: acetaminophen, HYDROcodone-acetaminophen, ondansetron, zolpidem    No results found.    Assessment/Plan   Principal Problem:    GI bleeding  He is admitted with maroon stool, he is known to have a history of recurrent GI bleeding, history  of colon cancer, colostomy, history of ulcerative colitis, and seeing the gastroenterologist, had a recent endoscopy colonoscopy done, he will be getting a capsule endoscopy tomorrow, his Eliquis is on hold, he is on aspirin per cardiologist.    Acute anemia, he was transfused, he will get another unit of blood today.  History of coronary artery disease, CABG, hypertension, will continue with the rest of the meds.  He is known to have history of CHF, on IV Lasix and his meds were restarted by the cardiologist  BPH continue with the same.  DVT prophylaxis  Discussed with the patient and the wife at the bedside          I spent 30 minutes in the professional and overall care of this patient.    Timothy Calderon MD

## 2023-10-10 NOTE — PROGRESS NOTES
GI Daily Progress Note    Assessment/Plan:    Principal Problem:    GI bleeding    -Monitor H & H   -capsule endoscopy done, results pending  - diet as tolerated  - if no further bleeding, ok to discharge      LOS: 3 days     Michael Duran is a 83 y.o. male who was admitted with GI bleeding. He reports his symptoms are improving with treatment. Stool is soft brown today    Subjective:    Patient expresses he is feeling better.  Patient denies abdominal pain    Objective:    Vital signs in last 24 hours:  Temp:  [36 °C (96.8 °F)-37 °C (98.6 °F)] 36.6 °C (97.9 °F)  Heart Rate:  [70-82] 70  Resp:  [17-18] 18  BP: (101-120)/(49-70) 104/61    Intake/Output last 3 shifts:  I/O last 3 completed shifts:  In: 1018.3 (12.4 mL/kg) [P.O.:600; Blood:418.3]  Out: 900 (11 mL/kg) [Urine:900 (0.3 mL/kg/hr)]  Weight: 82 kg   Intake/Output this shift:  No intake/output data recorded.    Physical Exam  Vitals reviewed.   Constitutional:       Appearance: Normal appearance. He is normal weight.   HENT:      Head: Normocephalic and atraumatic.      Nose: Nose normal.      Mouth/Throat:      Mouth: Mucous membranes are moist.      Pharynx: Oropharynx is clear.   Eyes:      Conjunctiva/sclera: Conjunctivae normal.   Cardiovascular:      Rate and Rhythm: Normal rate and regular rhythm.      Pulses: Normal pulses.      Heart sounds: Normal heart sounds.   Pulmonary:      Effort: Pulmonary effort is normal.      Breath sounds: Normal breath sounds.   Abdominal:      General: Bowel sounds are normal.      Palpations: Abdomen is soft.      Comments: Ostomy with soft brown stool   Musculoskeletal:         General: Normal range of motion.      Cervical back: Normal range of motion and neck supple.   Skin:     General: Skin is warm and dry.   Neurological:      General: No focal deficit present.      Mental Status: He is alert and oriented to person, place, and time. Mental status is at baseline.   Psychiatric:         Mood and Affect: Mood  normal.         Behavior: Behavior normal.          Results for orders placed or performed during the hospital encounter of 10/07/23 (from the past 24 hour(s))   Prepare RBC: 1 Units   Result Value Ref Range    PRODUCT CODE [id number]     Unit Number [id number]     Unit ABO O     Unit RH POS     XM INTEP COMP     Dispense Status TR     Blood Expiration Date October 21, 2023 23:59 EDT     PRODUCT BLOOD TYPE 5100     UNIT VOLUME 350    CBC   Result Value Ref Range    WBC 4.1 (L) 4.4 - 11.3 x10*3/uL    nRBC 0.0 0.0 - 0.0 /100 WBCs    RBC 2.90 (L) 4.50 - 5.90 x10*6/uL    Hemoglobin 8.6 (L) 13.5 - 17.5 g/dL    Hematocrit 28.0 (L) 41.0 - 52.0 %    MCV 97 80 - 100 fL    MCH 29.7 26.0 - 34.0 pg    MCHC 30.7 (L) 32.0 - 36.0 g/dL    RDW 22.5 (H) 11.5 - 14.5 %    Platelets 91 (L) 150 - 450 x10*3/uL    MPV 10.6 7.5 - 11.5 fL   Basic Metabolic Panel   Result Value Ref Range    Glucose 84 74 - 99 mg/dL    Sodium 131 (L) 136 - 145 mmol/L    Potassium 3.4 (L) 3.5 - 5.3 mmol/L    Chloride 95 (L) 98 - 107 mmol/L    Bicarbonate 27 21 - 32 mmol/L    Anion Gap 12 10 - 20 mmol/L    Urea Nitrogen 49 (H) 6 - 23 mg/dL    Creatinine 2.39 (H) 0.50 - 1.30 mg/dL    eGFR 26 (L) >60 mL/min/1.73m*2    Calcium 8.2 (L) 8.6 - 10.3 mg/dL      No current facility-administered medications on file prior to encounter.     Current Outpatient Medications on File Prior to Encounter   Medication Sig Dispense Refill    acetaminophen (Tylenol) 325 mg tablet Take by mouth every 6 hours if needed.      ALPRAZolam (Xanax) 0.25 mg tablet Take by mouth every 8 hours if needed.      amiodarone (Pacerone) 200 mg tablet Take 1 tablet (200 mg) by mouth once daily.      apixaban (Eliquis) 2.5 mg tablet TAKE 1 TABLET BY MOUTH TWO TIMES A DAY 60 tablet 1    bumetanide (Bumex) 2 mg tablet Take 1 tablet (2 mg) by mouth once daily.      carvedilol (Coreg) 3.125 mg tablet Take 1 tablet (3.125 mg) by mouth 2 times a day with meals.      celecoxib (CeleBREX) 200 mg capsule  Take by mouth.      cephalexin (Keflex) 500 mg capsule       cholestyramine (Questran) 4 gram packet Take by mouth.      clopidogrel (Plavix) 75 mg tablet Take 1 tablet (75 mg) by mouth once daily.      diphenoxylate-atropine (Lomotil) 2.5-0.025 mg tablet TAKE 2 TABLETS BY MOUTH 4 TIMES DAILY AS NEEDED.      Eliquis 2.5 mg tablet       Eliquis 5 mg tablet Take 1 tablet (5 mg) by mouth 2 times a day.      Entresto 24-26 mg tablet Take 1 tablet by mouth 2 times a day.      eplerenone (Inspra) 25 mg tablet Take 1 tablet (25 mg) by mouth once daily.      fluticasone (Flonase) 50 mcg/actuation nasal spray Administer 1 spray into each nostril once daily.      furosemide (Lasix) 20 mg tablet TAKE 1 TO 2 TABLETS BY MOUTH DAILY AS DIRECTED      guaiFENesin (Mucinex) 600 mg 12 hr tablet Take 1 tablet (600 mg) by mouth 2 times a day.      HYDROcodone-acetaminophen (Norco) 5-325 mg tablet Take 1 tablet by mouth.      Jardiance 10 mg Take 1 tablet (10 mg) by mouth once daily.      Lagevrio, EUA, 200 mg capsule Take by mouth.      levothyroxine (Synthroid, Levoxyl) 25 mcg tablet Take 1 tablet (25 mcg) by mouth once daily.      loperamide (Imodium) 2 mg capsule TAKE 3 CAPSULES BY MOUTH 4 TIMES DAILY      losartan (Cozaar) 25 mg tablet Take 0.5 tablets (12.5 mg) by mouth once daily.      magnesium oxide (Mag-Ox) 400 mg (241.3 mg magnesium) tablet Take 1 tablet (400 mg) by mouth once daily at bedtime.      meclizine (Antivert) 12.5 mg tablet TAKE 1 TABLET 3 TIMES DAILY AS NEEDED FOR VIRTIGO/DIZZINESS.      metOLazone (Zaroxolyn) 5 mg tablet       mirtazapine (Remeron) 30 mg tablet TAKE 1 TABLET BY MOUTH EVERY DAY AT BEDTIME FOR 30 DAYS      naloxone (Narcan) 4 mg/0.1 mL nasal spray Administer into affected nostril(s).      omeprazole (PriLOSEC) 40 mg DR capsule Take by mouth.      pantoprazole (ProtoNix) 40 mg EC tablet TAKE 1 TABLET BY MOUTH TWO TIMES A DAY 60 tablet 1    potassium chloride CR 10 mEq ER tablet       predniSONE  (Deltasone) 20 mg tablet Take 1 tablet (20 mg) by mouth.      rosuvastatin (Crestor) 5 mg tablet Take 1 tablet (5 mg) by mouth once daily.      sertraline (Zoloft) 50 mg tablet Take 1 tablet (50 mg) by mouth once daily.      tafamidis (Vyndamax) 61 mg capsule Take 1 capsule (61 mg) by mouth once daily.      tamsulosin (Flomax) 0.4 mg 24 hr capsule Take 1 capsule (0.4 mg) by mouth once daily.      traMADol (Ultram) 50 mg tablet Take 1 tablet (50 mg) by mouth 2 times a day.      zolpidem (Ambien) 5 mg tablet TAKE 1 TABLET BY MOUTH EVERY DAY AT BEDTIME FOR 30 DAYS

## 2023-10-10 NOTE — PROGRESS NOTES
Not medically ready for discharge. H/H 7.0/22.7 ADOD TBD. Chart reviewed per [facility]. Plan for transfusion today.  Patient received transfusion yesterday.  Capsule endoscopy yesterday. Per MD advance diet to full liquids today.  [facility] following for discharge planning.   Filomena ANDRADEN RN [facility] CCM

## 2023-10-10 NOTE — PROGRESS NOTES
Subjective Data:  Symptoms continue to improve.    No more blood on ostomy.   Capsule endoscopy done- results pending.  Received PRBC today Hemoglobin 8.6 today        Objective Data:  Last Recorded Vitals:  Vitals:    10/09/23 2003 10/10/23 0100 10/10/23 0737 10/10/23 1149   BP: 112/67 115/70 109/57 104/61   BP Location: Left arm      Patient Position: Sitting Sitting Lying    Pulse: 71 72 71 70   Resp: 18 18 18 18   Temp: 36.4 °C (97.5 °F) 37 °C (98.6 °F) 37 °C (98.6 °F) 36.6 °C (97.9 °F)   TempSrc: Oral Oral Oral Temporal   SpO2: 97% 97% 93% 96%   Weight:       Height:         Results from last 7 days   Lab Units 10/10/23  0659 10/09/23  0636 10/08/23  0550 10/07/23  1325 10/07/23  1132 10/05/23  0945   SODIUM mmol/L 131* 132* 132*  --  133* 131*   POTASSIUM mmol/L 3.4* 3.5 3.7   < > 6.5* 5.0   CHLORIDE mmol/L 95* 96* 97*  --  96* 93*   CO2 mmol/L 27 27 25  --  28 25   BUN mg/dL 49* 51* 60*  --  70* 73*   CREATININE mg/dL 2.39* 2.24* 2.39*  --  2.62* 2.63*   CALCIUM mg/dL 8.2* 8.2* 8.6  --  8.9 10.1   PROTEIN TOTAL g/dL  --   --  7.1  --  7.0 7.6   BILIRUBIN TOTAL mg/dL  --   --  1.0  --  0.8 1.0   ALK PHOS U/L  --   --  88  --  90 122   ALT U/L  --   --  24  --  28 26   AST U/L  --   --  50*  --  89* 53*   GLUCOSE mg/dL 84 78 112*  --  103* 137*    < > = values in this interval not displayed.       Results from last 7 days   Lab Units 10/10/23  0659 10/09/23  0636 10/08/23  0550   WBC AUTO x10*3/uL 4.1* 3.7* 4.2*   HEMOGLOBIN g/dL 8.6* 7.0* 7.3*   HEMATOCRIT % 28.0* 22.7* 24.6*   PLATELETS AUTO x10*3/uL 91* 81* 94*             TROPHS   Date/Time Value Ref Range Status   10/08/2023 06:21  0 - 20 ng/L Final     Comment:     Previous result verified on 10/7/2023 1215 on specimen/case [id number] called with component [facility] for procedure Troponin I, High Sensitivity with value 166 ng/L.   10/08/2023 05:50  0 - 20 ng/L Final     Comment:     Previous result verified on 10/7/2023 1215 on specimen/case  [id number] called with component [facility] for procedure Troponin I, High Sensitivity with value 166 ng/L.   10/08/2023 05:50  0 - 20 ng/L Final     Comment:     Previous result verified on 10/7/2023 1215 on specimen/case [id number] called with component [facility] for procedure Troponin I, High Sensitivity with value 166 ng/L.     BNP   Date/Time Value Ref Range Status   10/07/2023 11:32  0 - 99 pg/mL Final   10/05/2023 09:45 AM 1,311 0 - 99 pg/mL Final     HGBA1C   Date/Time Value Ref Range Status   06/04/2023 09:44 AM 4.7 4.3 - 5.6 % Final     Comment:     American Diabetes Association guidelines indicate that patients with HgbA1c in the range 5.7-6.4% are at increased risk for development of diabetes, and intervention by lifestyle modification may be beneficial. HgbA1c greater or equal to 6.5% is considered diagnostic of diabetes.   03/10/2022 10:17 AM 6.1 % Final     Comment:          Diagnosis of Diabetes-Adults   Non-Diabetic: < or = 5.6%   Increased risk for developing diabetes: 5.7-6.4%   Diagnostic of diabetes: > or = 6.5%  .       Monitoring of Diabetes                Age (y)     Therapeutic Goal (%)   Adults:          >18           <7.0   Pediatrics:    13-18           <7.5                   7-12           <8.0                   0- 6            7.5-8.5   American Diabetes Association. Diabetes Care 33(S1), Jan 2010.       VLDL   Date/Time Value Ref Range Status   03/29/2023 06:16 PM 21 0 - 40 mg/dL Final   03/29/2023 03:41 PM CANCELED       Comment:     Result canceled by the ancillary.   10/24/2022 07:03 AM 24 0 - 40 mg/dL Final      Last I/O:  I/O last 3 completed shifts:  In: 1018.3 (12.4 mL/kg) [P.O.:600; Blood:418.3]  Out: 900 (11 mL/kg) [Urine:900 (0.3 mL/kg/hr)]  Weight: 82 kg       Intake/Output Summary (Last 24 hours) at 10/10/2023 1549  Last data filed at 10/9/2023 1845  Gross per 24 hour   Intake 618.33 ml   Output --   Net 618.33 ml          Past Cardiology Tests (Last 3  Years):  EKG:  No results found for this or any previous visit from the past 1095 days.    Echo:  An echocardiogram from April 26, 2023  CONCLUSIONS:  1. Left ventricular systolic function is mildly decreased with a 45% estimated ejection fraction.  2. Spectral Doppler shows a restrictive pattern of left ventricular diastolic filling.  3. There is severe concentric left ventricular hypertrophy.  4. There is low normal right ventricular systolic function.  5. The left atrium is severely dilated.  6. The right atrium is moderately to severely dilated.  7. Mild to moderate mitral valve regurgitation.  8. Moderate tricuspid regurgitation.  9. Moderately elevated right ventricular systolic pressure.  10. There is global hypokinesis of the left ventricle with minor regional variations.        Cath:  Right heart cath October 2022  CONCLUSIONS:   1. RA 10, PA 35/18/(23), PCW 17, sat 53%, CO/CI 4.5/2.3, SVR 1244 dynes, PVR 1.5 ROYAL.   2. Preserved CO/CO with elevated filling pressures.   3. EMBx x5 sent to pathology to evaluate for cardiac amyloidosis.     Left heart catheterization June 2022  CONCLUSIONS:   1. Severe native 3v CAD.   2. Widely patent LIMA to LAD.   3. Severely diseased LCx with occluded mid LCx-OM2 stent, which jails the AVG LCx stent beyond OM2 takeoff - simlar in appearance to prior angiogram in 5/22.   4. Multiple stents in the RCA with proximal ~ 30-40% stenosis, and mid-distal 30% stenosis.   5. No significant LV-AO peak to peak pullback gradient.   6. Left Ventricular end-diastolic pressure = 12.           Stress Test:  Nuclear Stress Test 2/14/2023     Cardiac Imaging:  MR cardiac morphology and function w and wo IV contrast 3/7/2023      Inpatient Medications:  Scheduled medications   Medication Dose Route Frequency    amiodarone  200 mg oral Daily    aspirin  81 mg oral Daily    empagliflozin  10 mg oral Daily    eplerenone  25 mg oral Daily    furosemide  80 mg intravenous q12h    guaiFENesin  600  mg oral BID    levothyroxine  25 mcg oral Daily    pantoprazole  40 mg intravenous Daily    polyethylene glycol  17 g oral Daily    tafamidis  61 mg oral Daily    traMADol  50 mg oral BID     PRN medications   Medication    acetaminophen    HYDROcodone-acetaminophen    ondansetron    zolpidem     Continuous Medications   Medication Dose Last Rate       Physical Exam:  General: in no acute distress  HEENT: Normocephalic atraumatic  Neck:+JVD   Pulmonary: Normal respiratory effort, diffuse crackles b  Cardiovascular: normal S1 and S2, 2/6 hsm   Abdomen: Soft nontender nondistended  Extremities: Warm with 1+ BLE edema 2+ R radial pulse  Neurologic: Alert and oriented x3  Psychiatric: Normal mood and affect     Assessment/Plan     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  Acute myocardial injury in setting of anemia, CAD, and amyloid  CAD s/p CABG and PCI  Afib/flutter  VT s/p ICD  H/o LV thrombus  HTN  HL  CKD  Recurrent GI bleeding     Rec:  Lasix 80mg iv bid  Cont outpatient eplerenone  Continue aspirin 81mg daily- must stay on without interruption due to multiple stents in RCA including multiple layers  Hold Eliquis - consider outpatient watchman, but would need to tolerate some form of therapy ( DAPT vs Short Term Apaixaban)   Continue tafamidis       Kevyn Mazariegos, APRN-CNP    ===========================================  Attending Note   ===========================================  Both the ANTHONY and I have had a face to face encounter with the patient today. I have examined the patient and edited the documented physical examination as necessary.  I personally reviewed the patient's recent labs, medications, orders, EKGs, and pertinent cardiac imaging.  I have reviewed the ANTHONY's encounter note, approve the ANTHONY's documentation and have edited the note to reflect the diagnostic and therapeutic plan.    Patient had an uneventful night.    10 point  physical examination remains unchanged.    I personally reviewed vital signs, telemetry, medications, labs, and imaging as well.    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  Acute myocardial injury in setting of anemia, CAD, and amyloid  CAD s/p CABG and PCI  Afib/flutter  VT s/p ICD  H/o LV thrombus  HTN  HL  CKD  Recurrent GI bleeding     Rec:  Lasix 80mg iv bid  Cont outpatient eplerenone  Continue aspirin 81mg daily- must stay on without interruption due to multiple stents in RCA including multiple layers  Hold Eliquis - consider outpatient watchman, but would need to tolerate some form of therapy ( DAPT vs Short Term Apaixaban)   Continue tafamidis      No additional recommendations at this time   Ongoing Disposition planning as per Primary Service.     Kevin Lee DO   Clinical Cardology   [facility] [place]  [facility]

## 2023-10-10 NOTE — PROGRESS NOTES
"Michael Duran is a 83 y.o. male on day 3 of admission presenting with GI bleeding.    Subjective   10/9/2023  Seen and examined in the morning.  His hemoglobin is down to 7 will transfuse.  He is on aspirin now off Eliquis.  He will be transfused.  He is waiting for capsule endoscopy today.  10/10/2023  He did have a capsule endoscopy yesterday.  He was transfused yesterday.  No bleeding noticed.  He is on clear liquid diet.  We will advance to full liquid diet.         Objective     Physical Exam  Constitutional:       Appearance: Normal appearance.   HENT:      Head: Normocephalic and atraumatic.      Nose: Nose normal.   Eyes:      Pupils: Pupils are equal, round, and reactive to light.   Cardiovascular:      Rate and Rhythm: Normal rate.   Abdominal:      General: There is no distension.      Palpations: Abdomen is soft. There is no mass.      Tenderness: There is no abdominal tenderness.   Musculoskeletal:         General: Normal range of motion.      Cervical back: Normal range of motion and neck supple.   Skin:     General: Skin is warm.   Neurological:      General: No focal deficit present.      Mental Status: He is alert and oriented to person, place, and time.      Motor: No weakness.   Psychiatric:         Mood and Affect: Mood normal.         Last Recorded Vitals  Blood pressure 109/57, pulse 71, temperature 37 °C (98.6 °F), temperature source Oral, resp. rate 18, height 1.702 m (5' 7.01\"), weight 82 kg (180 lb 12.4 oz), SpO2 93 %.  Intake/Output last 3 Shifts:  I/O last 3 completed shifts:  In: 1018.3 (12.4 mL/kg) [P.O.:600; Blood:418.3]  Out: 900 (11 mL/kg) [Urine:900 (0.3 mL/kg/hr)]  Weight: 82 kg     Relevant Results  Results for orders placed or performed during the hospital encounter of 10/07/23 (from the past 96 hour(s))   CBC and Auto Differential   Result Value Ref Range    WBC 3.8 (L) 4.4 - 11.3 x10*3/uL    nRBC 0.0 0.0 - 0.0 /100 WBCs    RBC 1.99 (L) 4.50 - 5.90 x10*6/uL    Hemoglobin 6.1 " (LL) 13.5 - 17.5 g/dL    Hematocrit 20.3 (L) 41.0 - 52.0 %     (H) 80 - 100 fL    MCH 30.7 26.0 - 34.0 pg    MCHC 30.0 (L) 32.0 - 36.0 g/dL    RDW 21.9 (H) 11.5 - 14.5 %    Platelets 99 (L) 150 - 450 x10*3/uL    MPV 10.1 7.5 - 11.5 fL    Neutrophils % 65.3 40.0 - 80.0 %    Immature Granulocytes %, Automated 1.0 (H) 0.0 - 0.9 %    Lymphocytes % 18.8 13.0 - 44.0 %    Monocytes % 11.5 2.0 - 10.0 %    Eosinophils % 3.1 0.0 - 6.0 %    Basophils % 0.3 0.0 - 2.0 %    Neutrophils Absolute 2.50 1.60 - 5.50 x10*3/uL    Immature Granulocytes Absolute, Automated 0.04 0.00 - 0.50 x10*3/uL    Lymphocytes Absolute 0.72 (L) 0.80 - 3.00 x10*3/uL    Monocytes Absolute 0.44 0.05 - 0.80 x10*3/uL    Eosinophils Absolute 0.12 0.00 - 0.40 x10*3/uL    Basophils Absolute 0.01 0.00 - 0.10 x10*3/uL   Comprehensive metabolic panel   Result Value Ref Range    Glucose 103 (H) 74 - 99 mg/dL    Sodium 133 (L) 136 - 145 mmol/L    Potassium 6.5 (HH) 3.5 - 5.3 mmol/L    Chloride 96 (L) 98 - 107 mmol/L    Bicarbonate 28 21 - 32 mmol/L    Anion Gap 16 10 - 20 mmol/L    Urea Nitrogen 70 (H) 6 - 23 mg/dL    Creatinine 2.62 (H) 0.50 - 1.30 mg/dL    eGFR 24 (L) >60 mL/min/1.73m*2    Calcium 8.9 8.6 - 10.3 mg/dL    Albumin 3.1 (L) 3.4 - 5.0 g/dL    Alkaline Phosphatase 90 33 - 136 U/L    Total Protein 7.0 6.4 - 8.2 g/dL    AST 89 (H) 9 - 39 U/L    Bilirubin, Total 0.8 0.0 - 1.2 mg/dL    ALT 28 10 - 52 U/L   Troponin I, High Sensitivity   Result Value Ref Range    Troponin I, High Sensitivity 166 (HH) 0 - 20 ng/L   B-Type Natriuretic Peptide   Result Value Ref Range     (H) 0 - 99 pg/mL   Type And Screen   Result Value Ref Range    ABO TYPE O     Rh TYPE POS     ANTIBODY SCREEN NEG    Lactate   Result Value Ref Range    Lactate 1.7 0.4 - 2.0 mmol/L   Morphology   Result Value Ref Range    RBC Morphology See Below     Polychromasia Mild     Ovalocytes Few    Urinalysis with Reflex Microscopic and Culture   Result Value Ref Range    Color, Urine  Yellow Straw, Yellow    Appearance, Urine Clear Clear    Specific Gravity, Urine 1.011 1.005 - 1.035    pH, Urine 6.0 5.0, 5.5, 6.0, 6.5, 7.0, 7.5, 8.0    Protein, Urine NEGATIVE NEGATIVE mg/dL    Glucose, Urine 150 (2+) (A) NEGATIVE mg/dL    Blood, Urine NEGATIVE NEGATIVE    Ketones, Urine NEGATIVE NEGATIVE mg/dL    Bilirubin, Urine NEGATIVE NEGATIVE    Urobilinogen, Urine <2.0 <2.0 mg/dL    Nitrite, Urine NEGATIVE NEGATIVE    Leukocyte Esterase, Urine NEGATIVE NEGATIVE   SARS-CoV-2 RT PCR   Result Value Ref Range    Coronavirus 2019, PCR Not Detected Not Detected   Potassium   Result Value Ref Range    Potassium 3.8 3.5 - 5.3 mmol/L   Troponin I, High Sensitivity   Result Value Ref Range    Troponin I, High Sensitivity 170 (HH) 0 - 20 ng/L   Prepare RBC: 1 Units   Result Value Ref Range    PRODUCT CODE J8056W16     Unit Number [id number]     Unit ABO O     Unit RH POS     XM INTEP COMP     Dispense Status TR     Blood Expiration Date October 28, 2023 23:59 EDT     PRODUCT BLOOD TYPE 5100     UNIT VOLUME 400    Type And Screen   Result Value Ref Range    ABO TYPE O     Rh TYPE POS     ANTIBODY SCREEN NEG    Troponin I, High Sensitivity   Result Value Ref Range    Troponin I, High Sensitivity 156 (HH) 0 - 20 ng/L   Troponin I, High Sensitivity, Initial   Result Value Ref Range    Troponin I, High Sensitivity 156 (HH) 0 - 20 ng/L   CBC   Result Value Ref Range    WBC 4.2 (L) 4.4 - 11.3 x10*3/uL    nRBC 0.0 0.0 - 0.0 /100 WBCs    RBC 2.37 (L) 4.50 - 5.90 x10*6/uL    Hemoglobin 7.3 (L) 13.5 - 17.5 g/dL    Hematocrit 24.6 (L) 41.0 - 52.0 %     (H) 80 - 100 fL    MCH 30.8 26.0 - 34.0 pg    MCHC 29.7 (L) 32.0 - 36.0 g/dL    RDW 22.3 (H) 11.5 - 14.5 %    Platelets 94 (L) 150 - 450 x10*3/uL    MPV 10.3 7.5 - 11.5 fL   Comprehensive metabolic panel   Result Value Ref Range    Glucose 112 (H) 74 - 99 mg/dL    Sodium 132 (L) 136 - 145 mmol/L    Potassium 3.7 3.5 - 5.3 mmol/L    Chloride 97 (L) 98 - 107 mmol/L     Bicarbonate 25 21 - 32 mmol/L    Anion Gap 14 10 - 20 mmol/L    Urea Nitrogen 60 (H) 6 - 23 mg/dL    Creatinine 2.39 (H) 0.50 - 1.30 mg/dL    eGFR 26 (L) >60 mL/min/1.73m*2    Calcium 8.6 8.6 - 10.3 mg/dL    Albumin 3.1 (L) 3.4 - 5.0 g/dL    Alkaline Phosphatase 88 33 - 136 U/L    Total Protein 7.1 6.4 - 8.2 g/dL    AST 50 (H) 9 - 39 U/L    Bilirubin, Total 1.0 0.0 - 1.2 mg/dL    ALT 24 10 - 52 U/L   Troponin, High Sensitivity, 1 Hour   Result Value Ref Range    Troponin I, High Sensitivity 166 (HH) 0 - 20 ng/L   Occult Blood, Stool    Specimen: Stool   Result Value Ref Range    Occult Blood, Stool X1 Positive (A) Negative   Prepare RBC: 1 Units   Result Value Ref Range    PRODUCT CODE [id number]     Unit Number [id number]     Unit ABO O     Unit RH POS     XM INTEP COMP     Dispense Status TR     Blood Expiration Date November 07, 2023 23:59 EST     PRODUCT BLOOD TYPE 5100     UNIT VOLUME 400    CBC   Result Value Ref Range    WBC 3.7 (L) 4.4 - 11.3 x10*3/uL    nRBC 0.0 0.0 - 0.0 /100 WBCs    RBC 2.27 (L) 4.50 - 5.90 x10*6/uL    Hemoglobin 7.0 (L) 13.5 - 17.5 g/dL    Hematocrit 22.7 (L) 41.0 - 52.0 %     80 - 100 fL    MCH 30.8 26.0 - 34.0 pg    MCHC 30.8 (L) 32.0 - 36.0 g/dL    RDW 21.5 (H) 11.5 - 14.5 %    Platelets 81 (L) 150 - 450 x10*3/uL    MPV 10.3 7.5 - 11.5 fL   Basic Metabolic Panel   Result Value Ref Range    Glucose 78 74 - 99 mg/dL    Sodium 132 (L) 136 - 145 mmol/L    Potassium 3.5 3.5 - 5.3 mmol/L    Chloride 96 (L) 98 - 107 mmol/L    Bicarbonate 27 21 - 32 mmol/L    Anion Gap 13 10 - 20 mmol/L    Urea Nitrogen 51 (H) 6 - 23 mg/dL    Creatinine 2.24 (H) 0.50 - 1.30 mg/dL    eGFR 28 (L) >60 mL/min/1.73m*2    Calcium 8.2 (L) 8.6 - 10.3 mg/dL   Prepare RBC: 1 Units   Result Value Ref Range    PRODUCT CODE [id number]     Unit Number [id number]     Unit ABO O     Unit RH POS     XM INTEP COMP     Dispense Status TR     Blood Expiration Date October 21, 2023 23:59 EDT     PRODUCT BLOOD TYPE  5100     UNIT VOLUME 350    CBC   Result Value Ref Range    WBC 4.1 (L) 4.4 - 11.3 x10*3/uL    nRBC 0.0 0.0 - 0.0 /100 WBCs    RBC 2.90 (L) 4.50 - 5.90 x10*6/uL    Hemoglobin 8.6 (L) 13.5 - 17.5 g/dL    Hematocrit 28.0 (L) 41.0 - 52.0 %    MCV 97 80 - 100 fL    MCH 29.7 26.0 - 34.0 pg    MCHC 30.7 (L) 32.0 - 36.0 g/dL    RDW 22.5 (H) 11.5 - 14.5 %    Platelets 91 (L) 150 - 450 x10*3/uL    MPV 10.6 7.5 - 11.5 fL   Basic Metabolic Panel   Result Value Ref Range    Glucose 84 74 - 99 mg/dL    Sodium 131 (L) 136 - 145 mmol/L    Potassium 3.4 (L) 3.5 - 5.3 mmol/L    Chloride 95 (L) 98 - 107 mmol/L    Bicarbonate 27 21 - 32 mmol/L    Anion Gap 12 10 - 20 mmol/L    Urea Nitrogen 49 (H) 6 - 23 mg/dL    Creatinine 2.39 (H) 0.50 - 1.30 mg/dL    eGFR 26 (L) >60 mL/min/1.73m*2    Calcium 8.2 (L) 8.6 - 10.3 mg/dL        Medications:  amiodarone, 200 mg, oral, Daily  aspirin, 81 mg, oral, Daily  empagliflozin, 10 mg, oral, Daily  eplerenone, 25 mg, oral, Daily  furosemide, 80 mg, intravenous, q12h  guaiFENesin, 600 mg, oral, BID  levothyroxine, 25 mcg, oral, Daily  pantoprazole, 40 mg, intravenous, Daily  polyethylene glycol, 17 g, oral, Daily  tafamidis, 61 mg, oral, Daily  traMADol, 50 mg, oral, BID      PRN medications: acetaminophen, HYDROcodone-acetaminophen, ondansetron, zolpidem    No results found.  Assessment and plan.  Gastrointestinal bleeding     He is admitted with maroon stool, he is known to have a history of recurrent GI bleeding, history of colon cancer, colostomy, history of ulcerative colitis, and seeing the gastroenterologist, had a recent endoscopy colonoscopy done, he will be getting a capsule endoscopy tomorrow, his Eliquis is on hold, he is on aspirin per cardiologist.  He did have a capsule endoscopy done yesterday, no further bleeding noticed.       Acute anemia, he was transfused, his hemoglobin is 8  History of coronary artery disease, CABG, hypertension, will continue with the rest of the meds.  He is  known to have history of CHF, on IV Lasix and his meds were restarted by the cardiologist  BPH continue with the same.  DVT prophylaxis  Discussed with the patient and the wife at the bedside       I spent 35 minutes in the professional and overall care of this patient.    Timothy Calderon MD

## 2023-10-10 NOTE — CARE PLAN
The patient's goals for the shift include pt to be safe and comfortable throughout shift    The clinical goals for the shift include rest and safety    Problem: Pain  Goal: My pain/discomfort is manageable  Outcome: Progressing     Problem: Safety  Goal: Patient will be injury free during hospitalization  Outcome: Progressing  Goal: I will remain free of falls  Outcome: Progressing     Problem: Daily Care  Goal: Daily care needs are met  Outcome: Progressing     Problem: Psychosocial Needs  Goal: Demonstrates ability to cope with hospitalization/illness  Outcome: Progressing  Goal: Collaborate with me, my family, and caregiver to identify my specific goals  Outcome: Progressing     Problem: Discharge Barriers  Goal: My discharge needs are met  Outcome: Progressing

## 2023-10-11 NOTE — PROGRESS NOTES
Subjective Data:  Patient seen and examined, chart reviewed  -- States he is being discharged today, awaiting processing of paperwork  -- No further evidence of bleeding  -- Wishes to follow-up with his established cardiologist for further discussion on Watchman therapy       Objective Data:  Last Recorded Vitals:  Vitals:    10/10/23 1602 10/10/23 2137 10/11/23 0046 10/11/23 0900   BP: 109/63 113/56 105/51 104/57   BP Location: Right arm Left arm Left arm Right arm   Patient Position: Lying Lying Lying Sitting   Pulse: 75 71 73 71   Resp: 18 18 18 18   Temp: 35.8 °C (96.5 °F) 36.5 °C (97.7 °F) 36.4 °C (97.5 °F) 37.1 °C (98.8 °F)   TempSrc: Temporal Temporal Temporal Temporal   SpO2: 96% 95% 94% 98%   Weight:       Height:           Last Labs:  Results from last 7 days   Lab Units 10/11/23  0614 10/10/23  0659 10/09/23  0636   WBC AUTO x10*3/uL 3.3* 4.1* 3.7*   HEMOGLOBIN g/dL 8.3* 8.6* 7.0*   HEMATOCRIT % 26.7* 28.0* 22.7*   PLATELETS AUTO x10*3/uL 86* 91* 81*     Results from last 7 days   Lab Units 10/11/23  0614 10/10/23  0659 10/09/23  0636 10/08/23  0550 10/07/23  1325 10/07/23  1132 10/05/23  0945   SODIUM mmol/L 132* 131* 132* 132*  --  133* 131*   POTASSIUM mmol/L 3.3* 3.4* 3.5 3.7   < > 6.5* 5.0   CHLORIDE mmol/L 97* 95* 96* 97*  --  96* 93*   CO2 mmol/L 28 27 27 25  --  28 25   BUN mg/dL 43* 49* 51* 60*  --  70* 73*   CREATININE mg/dL 2.03* 2.39* 2.24* 2.39*  --  2.62* 2.63*   CALCIUM mg/dL 7.9* 8.2* 8.2* 8.6  --  8.9 10.1   PROTEIN TOTAL g/dL  --   --   --  7.1  --  7.0 7.6   BILIRUBIN TOTAL mg/dL  --   --   --  1.0  --  0.8 1.0   ALK PHOS U/L  --   --   --  88  --  90 122   ALT U/L  --   --   --  24  --  28 26   AST U/L  --   --   --  50*  --  89* 53*   GLUCOSE mg/dL 72* 84 78 112*  --  103* 137*    < > = values in this interval not displayed.           TROPHS   Date/Time Value Ref Range Status   10/08/2023 06:21  0 - 20 ng/L Final     Comment:     Previous result verified on 10/7/2023 1215 on  specimen/case [id number] called with component TRPHS for procedure Troponin I, High Sensitivity with value 166 ng/L.   10/08/2023 05:50  0 - 20 ng/L Final     Comment:     Previous result verified on 10/7/2023 1215 on specimen/case [id number] called with component TRPHS for procedure Troponin I, High Sensitivity with value 166 ng/L.   10/08/2023 05:50  0 - 20 ng/L Final     Comment:     Previous result verified on 10/7/2023 1215 on specimen/case [id number] called with component TRPHS for procedure Troponin I, High Sensitivity with value 166 ng/L.     BNP   Date/Time Value Ref Range Status   10/07/2023 11:32  0 - 99 pg/mL Final   10/05/2023 09:45 AM 1,311 0 - 99 pg/mL Final     HGBA1C   Date/Time Value Ref Range Status   06/04/2023 09:44 AM 4.7 4.3 - 5.6 % Final     Comment:     American Diabetes Association guidelines indicate that patients with HgbA1c in the range 5.7-6.4% are at increased risk for development of diabetes, and intervention by lifestyle modification may be beneficial. HgbA1c greater or equal to 6.5% is considered diagnostic of diabetes.   03/10/2022 10:17 AM 6.1 % Final     Comment:          Diagnosis of Diabetes-Adults   Non-Diabetic: < or = 5.6%   Increased risk for developing diabetes: 5.7-6.4%   Diagnostic of diabetes: > or = 6.5%  .       Monitoring of Diabetes                Age (y)     Therapeutic Goal (%)   Adults:          >18           <7.0   Pediatrics:    13-18           <7.5                   7-12           <8.0                   0- 6            7.5-8.5   American Diabetes Association. Diabetes Care 33(S1), Jan 2010.       VLDL   Date/Time Value Ref Range Status   03/29/2023 06:16 PM 21 0 - 40 mg/dL Final   03/29/2023 03:41 PM CANCELED       Comment:     Result canceled by the ancillary.   10/24/2022 07:03 AM 24 0 - 40 mg/dL Final      Last I/O:  I/O last 3 completed shifts:  In: 120 (1.5 mL/kg) [P.O.:120]  Out: - (0 mL/kg)   Weight: 82 kg     Echo:  An  echocardiogram from April 26, 2023  CONCLUSIONS:  1. Left ventricular systolic function is mildly decreased with a 45% estimated ejection fraction.  2. Spectral Doppler shows a restrictive pattern of left ventricular diastolic filling.  3. There is severe concentric left ventricular hypertrophy.  4. There is low normal right ventricular systolic function.  5. The left atrium is severely dilated.  6. The right atrium is moderately to severely dilated.  7. Mild to moderate mitral valve regurgitation.  8. Moderate tricuspid regurgitation.  9. Moderately elevated right ventricular systolic pressure.  10. There is global hypokinesis of the left ventricle with minor regional variations.        Cath:  Right heart cath October 2022  CONCLUSIONS:   1. RA 10, PA 35/18/(23), PCW 17, sat 53%, CO/CI 4.5/2.3, SVR 1244 dynes, PVR 1.5 ROYAL.   2. Preserved CO/CO with elevated filling pressures.   3. EMBx x5 sent to pathology to evaluate for cardiac amyloidosis.     Left heart catheterization June 2022  CONCLUSIONS:   1. Severe native 3v CAD.   2. Widely patent LIMA to LAD.   3. Severely diseased LCx with occluded mid LCx-OM2 stent, which jails the AVG LCx stent beyond OM2 takeoff - simlar in appearance to prior angiogram in 5/22.   4. Multiple stents in the RCA with proximal ~ 30-40% stenosis, and mid-distal 30% stenosis.   5. No significant LV-AO peak to peak pullback gradient.   6. Left Ventricular end-diastolic pressure = 12.      Inpatient Medications:  Scheduled medications   Medication Dose Route Frequency    amiodarone  200 mg oral Daily    aspirin  81 mg oral Daily    empagliflozin  10 mg oral Daily    eplerenone  25 mg oral Daily    furosemide  80 mg intravenous q12h    guaiFENesin  600 mg oral BID    levothyroxine  25 mcg oral Daily    pantoprazole  40 mg intravenous Daily    polyethylene glycol  17 g oral Daily    tafamidis  61 mg oral Daily    traMADol  50 mg oral BID     PRN medications   Medication    acetaminophen     "HYDROcodone-acetaminophen    ondansetron    zolpidem     Continuous Medications   Medication Dose Last Rate       Physical Exam:  /57 (BP Location: Right arm, Patient Position: Sitting)   Pulse 71   Temp 37.1 °C (98.8 °F) (Temporal)   Resp 18   Ht 1.702 m (5' 7.01\")   Wt 82 kg (180 lb 12.4 oz)   SpO2 98%   BMI 28.31 kg/m²   Net IO Since Admission: 837.81 mL [10/11/23 1105]    General Appearance:  Alert, cooperative, no distress, appears stated age   Head:  Normocephalic, without obvious abnormality, atraumatic   Eyes:  PERRL, conjunctiva/corneas clear, EOM's intact, fundi benign, both eyes   Ears:  Normal TM's and external ear canals, both ears   Nose: Nares normal, septum midline, mucosa normal, no drainage or sinus tenderness   Throat: Lips, mucosa, and tongue normal; teeth and gums normal   Neck: Supple,    Back:   Symmetric, no curvature, ROM normal, no CVA tenderness   Lungs:   Clear to auscultation bilaterally, respirations unlabored   Chest Wall:  No tenderness or deformity   Heart:  Regular rate and rhythm, S1, S2 normal, no murmur, rub or gallop   Abdomen:   Soft, non-tender, bowel sounds active all four quadrants,  no masses, no organomegaly   Extremities: Extremities normal, atraumatic, no cyanosis or edema   Pulses: 2+ and symmetric   Skin: Skin color, texture, turgor normal, no rashes or lesions   Lymph nodes: Cervical, supraclavicular, and axillary nodes normal   Neurologic: Normal          Assessment/Plan   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  Cont outpatient eplerenone  Continue aspirin 81mg daily- must stay on without interruption due to multiple stents in RCA including multiple layers  Hold " Eliquis - consider outpatient watchman, but would need to tolerate some form of therapy ( DAPT vs Short Term Apaixaban)   Continue tafamidis  Peripheral IV 10/07/23 20 G Right Antecubital (Active)   Site Assessment Dry;Intact;Bleeding 10/11/23 0800   Dressing Status Clean;Dry 10/11/23 0800   Number of days: 4       Code Status:  Full Code    I spent 30 minutes in the professional and overall care of this patient.        Kevin Lee, DO

## 2023-10-11 NOTE — PROGRESS NOTES
"Michael Duran is a 83 y.o. male on day 4 of admission presenting with GI bleeding.    Subjective   Patient seen and examined. He is sitting at the edge of bed. Feels ready for discharge. Discharge plan discussed with patient. All questions and concerns addressed    Objective     Physical Exam  Constitutional:       General: He is not in acute distress.     Appearance: Normal appearance.   HENT:      Head: Normocephalic and atraumatic.   Eyes:      Pupils: Pupils are equal, round, and reactive to light.   Cardiovascular:      Rate and Rhythm: Normal rate and regular rhythm.   Pulmonary:      Effort: Pulmonary effort is normal.      Breath sounds: Normal breath sounds.   Abdominal:      General: Bowel sounds are normal.      Palpations: Abdomen is soft.      Comments: Ostomy present   Musculoskeletal:         General: Normal range of motion.      Right lower leg: Edema present.      Left lower leg: Edema present.   Skin:     General: Skin is warm and dry.      Capillary Refill: Capillary refill takes less than 2 seconds.   Neurological:      Mental Status: He is alert and oriented to person, place, and time.   Psychiatric:         Mood and Affect: Mood normal.         Behavior: Behavior normal.         Last Recorded Vitals  Blood pressure 104/57, pulse 71, temperature 37.1 °C (98.8 °F), temperature source Temporal, resp. rate 18, height 1.702 m (5' 7.01\"), weight 82 kg (180 lb 12.4 oz), SpO2 98 %.  Intake/Output last 3 Shifts:  I/O last 3 completed shifts:  In: 120 (1.5 mL/kg) [P.O.:120]  Out: - (0 mL/kg)   Weight: 82 kg     Relevant Results  Scheduled medications  amiodarone, 200 mg, oral, Daily  aspirin, 81 mg, oral, Daily  empagliflozin, 10 mg, oral, Daily  eplerenone, 25 mg, oral, Daily  furosemide, 80 mg, intravenous, q12h  guaiFENesin, 600 mg, oral, BID  levothyroxine, 25 mcg, oral, Daily  pantoprazole, 40 mg, intravenous, Daily  polyethylene glycol, 17 g, oral, Daily  tafamidis, 61 mg, oral, Daily  traMADol, " 50 mg, oral, BID      Continuous medications     PRN medications  PRN medications: acetaminophen, HYDROcodone-acetaminophen, ondansetron, zolpidem   Results for orders placed or performed during the hospital encounter of 10/07/23 (from the past 24 hour(s))   CBC   Result Value Ref Range    WBC 3.3 (L) 4.4 - 11.3 x10*3/uL    nRBC 0.0 0.0 - 0.0 /100 WBCs    RBC 2.72 (L) 4.50 - 5.90 x10*6/uL    Hemoglobin 8.3 (L) 13.5 - 17.5 g/dL    Hematocrit 26.7 (L) 41.0 - 52.0 %    MCV 98 80 - 100 fL    MCH 30.5 26.0 - 34.0 pg    MCHC 31.1 (L) 32.0 - 36.0 g/dL    RDW 22.4 (H) 11.5 - 14.5 %    Platelets 86 (L) 150 - 450 x10*3/uL    MPV 10.5 7.5 - 11.5 fL   Basic Metabolic Panel   Result Value Ref Range    Glucose 72 (L) 74 - 99 mg/dL    Sodium 132 (L) 136 - 145 mmol/L    Potassium 3.3 (L) 3.5 - 5.3 mmol/L    Chloride 97 (L) 98 - 107 mmol/L    Bicarbonate 28 21 - 32 mmol/L    Anion Gap 10 10 - 20 mmol/L    Urea Nitrogen 43 (H) 6 - 23 mg/dL    Creatinine 2.03 (H) 0.50 - 1.30 mg/dL    eGFR 32 (L) >60 mL/min/1.73m*2    Calcium 7.9 (L) 8.6 - 10.3 mg/dL      XR chest 2 views    Result Date: 10/5/2023  STUDY: Chest Radiographs;  10/05/2023, 10:00AM INDICATION: Shortness of breath. COMPARISON: 05/31/2023, 06/14/2022 XR chest ACCESSION NUMBER(S): [id number] ORDERING CLINICIAN: RAMO HAWKINS TECHNIQUE:  Frontal and lateral chest. FINDINGS: CARDIOMEDIASTINAL SILHOUETTE: The patient status post median sternotomy. There is a dual lead pacemaker present. Cardiomediastinal silhouette is enlarged.  LUNGS: There are bibasilar infiltrates. These are linear and may represent subsegmental atelectasis but infection cannot be excluded.  ABDOMEN: No remarkable upper abdominal findings.  BONES: No acute osseous changes.    Bibasilar pulmonary infiltrates most likely representing atelectasis. Signed by Teo Nunez MD    CT head wo IV contrast    Result Date: 10/5/2023  Interpreted By:  Geovany Sebastian, STUDY: CT HEAD WO IV CONTRAST;  10/5/2023 9:41  am   INDICATION: Signs/Symptoms:HS.   COMPARISON: 02/21/2022   ACCESSION NUMBER(S): [id number]   ORDERING CLINICIAN: RAMO ECHEVERRIA   TECHNIQUE: Noncontrast axial CT scan of head was performed. Angled reformats in brain and bone windows were generated. The images were reviewed in bone, brain, blood and soft tissue windows.   FINDINGS: No acute edema. No acute hemorrhage. No mass effect. Ventricular system is normal for age. No extra-axial fluid collections. Orbits are normal. Paranasal sinuses are clear.   Dense calcifications of the vertebral arteries consistent with atherosclerosis.       No acute findings.   Signed by: Geovany Sebastian 10/5/2023 9:57 AM Dictation workstation:   [id number]    Point of Care Ultrasound    Result Date: 10/5/2023  Ramo Echeverria MD     10/5/2023  3:42 PM Performed by: Ramo Echeverria MD Authorized by: Ramo Echeverria MD  Procedure: Cardiac Ultrasound Findings:  Views: parasternal long, parasternal short, apical four and subxiphoid Effusion: The pericardial space was visualized and was positive for a PERICARDIAL EFFUSION. Activity: Ventricular contractions were visualized. LV: LV systolic function was DECREASED. RV: RV size was NORMAL. Impression: Cardiac: The focused cardiac ultrasound exam had ABNORMAL findings as specified. Comments: Pericardial effusion but no signs of tamponade.  IVC is plump.        Assessment/Plan     GIB  Hx colon CA, colostomy, ulcerative colitis  -GI following   -required PRBC transfusion   -Hgb stable, 8.3  -VCE done this admission, results pending  -ok to discharge per GI, they will call with VCE results  -PO Protonix at discharge  -GI follow up at discharge     Acute on Chronic systolic and diastolic heart failure  Hx CAD, CABG, HTN  -cards following  -was given 80mg Lasix BID   -will discharge with Lasix PO 80mg daily  -c/w aspirin, hold Eliquis for now per cards recs     *outpatient work up for potential watchman  -RFP in 1 week,  order placed, results to be faxed to PCP  -Has appt with regular cardiologist for next week    Hx CKD 3   -Creatinine 2.0, stable  -creatinine recently been around 1.7-2.0  -RFP in 1 week, results to PCP    Hx T2DM  -c/w Jardiance    Hx Hypothyroidism  -c/w Synthroid    DVT Prophylaxis  Contraindicated 2/2 GIB    Discharge disposition   Home      Plan of care discussed with Dr. ZOE Calderon  I spent 75 minutes in the professional and overall care of this patient.      Lakisha Valentin, SOFÍA-CNP

## 2023-10-11 NOTE — CARE PLAN
The patient's goals for the shift include pt to be safe and comfortable throughout shift    The clinical goals for the shift include Pt will have no signs of bleeding by end of shift.

## 2023-10-11 NOTE — DISCHARGE SUMMARY
Discharge Diagnosis  GI bleeding, he was admitted with maroon stool, upon admission his hemoglobin is less than 7, he was transfused, he had to be transfused couple of times, he was evaluated by the gastroenterology he had EGD and colonoscopy as an outpatient so he had capsule endoscopy done and his Eliquis was on hold aspirin was continued seen by the cardiologist he was also on IV Lasix, he was discharged home upon discharge his hemoglobin was 8.3    Issues Requiring Follow-Up  He will be seeing a gastroenterologist regarding the follow-up of the capsule endoscopy and the GI bleeding.  He will follow-up with the PCP.  He will follow-up with the cardiologist.      Test Results Pending At Discharge  Pending Labs       Order Current Status    Extra Tubes In process    Extra Tubes In process    Extra Tubes In process    Extra Tubes In process    Green Top In process    Lavender Top In process    Light Blue Top In process    PST Top In process    SST TOP In process            Hospital Course  10/9/2023  Seen and examined in the morning.  His hemoglobin is down to 7 will transfuse.  He is on aspirin now off Eliquis.  He will be transfused.  He is waiting for capsule endoscopy today.  10/10/2023  He did have a capsule endoscopy yesterday.  He was transfused yesterday.  No bleeding noticed.  He is on clear liquid diet.  We will advance to full liquid diet.      Pertinent Physical Exam At Time of Discharge  Physical Exam  Constitutional:       Appearance: Normal appearance.   HENT:      Head: Normocephalic and atraumatic.      Nose: Nose normal.   Eyes:      Pupils: Pupils are equal, round, and reactive to light.   Cardiovascular:      Rate and Rhythm: Normal rate.   Abdominal:      General: There is no distension.      Palpations: Abdomen is soft. There is no mass. colostomy     Tenderness: There is no abdominal tenderness.   Musculoskeletal:         General: Normal range of motion.      Cervical back: Normal range of  motion and neck supple.   Skin:     General: Skin is warm.   Neurological:      General: No focal deficit present.      Mental Status: He is alert and oriented to person, place, and time.      Motor: No weakness.   Psychiatric:         Mood and Affect: Mood normal.     Home Medications     Medication List      ASK your doctor about these medications     acetaminophen 325 mg tablet; Commonly known as: Tylenol   ALPRAZolam 0.25 mg tablet; Commonly known as: Xanax   amiodarone 200 mg tablet; Commonly known as: Pacerone   bumetanide 2 mg tablet; Commonly known as: Bumex   carvedilol 3.125 mg tablet; Commonly known as: Coreg   celecoxib 200 mg capsule; Commonly known as: CeleBREX   cephalexin 500 mg capsule; Commonly known as: Keflex   cholestyramine 4 gram packet; Commonly known as: Questran   clopidogrel 75 mg tablet; Commonly known as: Plavix   diphenoxylate-atropine 2.5-0.025 mg tablet; Commonly known as: Lomotil   * Eliquis 2.5 mg tablet; Generic drug: apixaban   * Eliquis 2.5 mg tablet; Generic drug: apixaban; TAKE 1 TABLET BY MOUTH   TWO TIMES A DAY   * Eliquis 5 mg tablet; Generic drug: apixaban   Entresto 24-26 mg tablet; Generic drug: sacubitriL-valsartan   eplerenone 25 mg tablet; Commonly known as: Inspra   fluticasone 50 mcg/actuation nasal spray; Commonly known as: Flonase   furosemide 20 mg tablet; Commonly known as: Lasix   guaiFENesin 600 mg 12 hr tablet; Commonly known as: Mucinex   HYDROcodone-acetaminophen 5-325 mg tablet; Commonly known as: Norco   Jardiance 10 mg; Generic drug: empagliflozin   Lagevrio (EUA) 200 mg capsule; Generic drug: molnupiravir   levothyroxine 25 mcg tablet; Commonly known as: Synthroid, Levoxyl   loperamide 2 mg capsule; Commonly known as: Imodium   losartan 25 mg tablet; Commonly known as: Cozaar   magnesium oxide 400 mg (241.3 mg magnesium) tablet; Commonly known as:   Mag-Ox   meclizine 12.5 mg tablet; Commonly known as: Antivert   metOLazone 5 mg tablet; Commonly known  as: Zaroxolyn   mirtazapine 30 mg tablet; Commonly known as: Remeron   naloxone 4 mg/0.1 mL nasal spray; Commonly known as: Narcan   omeprazole 40 mg DR capsule; Commonly known as: PriLOSEC   pantoprazole 40 mg EC tablet; Commonly known as: ProtoNix; TAKE 1 TABLET   BY MOUTH TWO TIMES A DAY   potassium chloride CR 10 mEq ER tablet; Commonly known as: Klor-Con   predniSONE 20 mg tablet; Commonly known as: Deltasone   rosuvastatin 5 mg tablet; Commonly known as: Crestor   sertraline 50 mg tablet; Commonly known as: Zoloft   tamsulosin 0.4 mg 24 hr capsule; Commonly known as: Flomax   traMADol 50 mg tablet; Commonly known as: Ultram   Vyndamax 61 mg capsule; Generic drug: tafamidis   zolpidem 5 mg tablet; Commonly known as: Ambien  * This list has 3 medication(s) that are the same as other medications   prescribed for you. Read the directions carefully, and ask your doctor or   other care provider to review them with you.       Outpatient Follow-Up  Future Appointments   Date Time Provider Department [place]   10/19/2023 11:15 AM Bev Alston MD [id number] [facility]   11/7/2023  9:15 AM [facility] [id number]  [id number] [facility]   11/7/2023  9:30 AM Edwar Aguiar MD [id number] [facility]   11/8/2023 10:40 AM Heena Stout MD PhD [id number] [facility]     Time spent more than 45 minutes about discharge planning  Timothy Calderon MD

## 2023-10-11 NOTE — PROGRESS NOTES
GI Daily Progress Note    Assessment/Plan:    Principal Problem:    GI bleeding    -Monitor H & H   -capsule endoscopy done, results pending, gi will update patient with results   - diet as tolerated  - if no further bleeding, ok to discharge       LOS: 4 days     Michael Duran is a 83 y.o. male who was admitted with GI bleeding. He reports his symptoms are improving with treatment. No blood in the ostomy    Subjective:    Patient expresses o complains today, tolerated diet.  Patient denies abdominal pain.    Objective:    Vital signs in last 24 hours:  Temp:  [35.8 °C (96.5 °F)-37.1 °C (98.8 °F)] 37.1 °C (98.8 °F)  Heart Rate:  [70-75] 71  Resp:  [18] 18  BP: (104-113)/(51-63) 104/57    Intake/Output last 3 shifts:  I/O last 3 completed shifts:  In: 120 (1.5 mL/kg) [P.O.:120]  Out: - (0 mL/kg)   Weight: 82 kg   Intake/Output this shift:  No intake/output data recorded.    Physical Exam  Vitals reviewed.   Constitutional:       Appearance: Normal appearance. He is normal weight.   HENT:      Head: Normocephalic and atraumatic.      Mouth/Throat:      Mouth: Mucous membranes are moist.      Pharynx: Oropharynx is clear.   Eyes:      Conjunctiva/sclera: Conjunctivae normal.   Cardiovascular:      Rate and Rhythm: Normal rate and regular rhythm.      Heart sounds: Normal heart sounds.   Pulmonary:      Effort: Pulmonary effort is normal.      Breath sounds: Normal breath sounds.   Abdominal:      General: Bowel sounds are normal.      Palpations: Abdomen is soft.          Comments: Stoma with soft brown stool   Musculoskeletal:         General: No swelling. Normal range of motion.      Cervical back: Neck supple.   Skin:     General: Skin is warm and dry.   Neurological:      General: No focal deficit present.      Mental Status: He is alert and oriented to person, place, and time. Mental status is at baseline.   Psychiatric:         Mood and Affect: Mood normal.         Behavior: Behavior normal.     Scheduled  medications  amiodarone, 200 mg, oral, Daily  aspirin, 81 mg, oral, Daily  empagliflozin, 10 mg, oral, Daily  eplerenone, 25 mg, oral, Daily  furosemide, 80 mg, intravenous, q12h  guaiFENesin, 600 mg, oral, BID  levothyroxine, 25 mcg, oral, Daily  pantoprazole, 40 mg, intravenous, Daily  polyethylene glycol, 17 g, oral, Daily  tafamidis, 61 mg, oral, Daily  traMADol, 50 mg, oral, BID      Continuous medications     PRN medications  PRN medications: acetaminophen, HYDROcodone-acetaminophen, ondansetron, zolpidem  Results for orders placed or performed during the hospital encounter of 10/07/23 (from the past 24 hour(s))   CBC   Result Value Ref Range    WBC 3.3 (L) 4.4 - 11.3 x10*3/uL    nRBC 0.0 0.0 - 0.0 /100 WBCs    RBC 2.72 (L) 4.50 - 5.90 x10*6/uL    Hemoglobin 8.3 (L) 13.5 - 17.5 g/dL    Hematocrit 26.7 (L) 41.0 - 52.0 %    MCV 98 80 - 100 fL    MCH 30.5 26.0 - 34.0 pg    MCHC 31.1 (L) 32.0 - 36.0 g/dL    RDW 22.4 (H) 11.5 - 14.5 %    Platelets 86 (L) 150 - 450 x10*3/uL    MPV 10.5 7.5 - 11.5 fL   Basic Metabolic Panel   Result Value Ref Range    Glucose 72 (L) 74 - 99 mg/dL    Sodium 132 (L) 136 - 145 mmol/L    Potassium 3.3 (L) 3.5 - 5.3 mmol/L    Chloride 97 (L) 98 - 107 mmol/L    Bicarbonate 28 21 - 32 mmol/L    Anion Gap 10 10 - 20 mmol/L    Urea Nitrogen 43 (H) 6 - 23 mg/dL    Creatinine 2.03 (H) 0.50 - 1.30 mg/dL    eGFR 32 (L) >60 mL/min/1.73m*2    Calcium 7.9 (L) 8.6 - 10.3 mg/dL          Results for orders placed or performed during the hospital encounter of 10/07/23 (from the past 24 hour(s))   CBC   Result Value Ref Range    WBC 3.3 (L) 4.4 - 11.3 x10*3/uL    nRBC 0.0 0.0 - 0.0 /100 WBCs    RBC 2.72 (L) 4.50 - 5.90 x10*6/uL    Hemoglobin 8.3 (L) 13.5 - 17.5 g/dL    Hematocrit 26.7 (L) 41.0 - 52.0 %    MCV 98 80 - 100 fL    MCH 30.5 26.0 - 34.0 pg    MCHC 31.1 (L) 32.0 - 36.0 g/dL    RDW 22.4 (H) 11.5 - 14.5 %    Platelets 86 (L) 150 - 450 x10*3/uL    MPV 10.5 7.5 - 11.5 fL   Basic Metabolic Panel    Result Value Ref Range    Glucose 72 (L) 74 - 99 mg/dL    Sodium 132 (L) 136 - 145 mmol/L    Potassium 3.3 (L) 3.5 - 5.3 mmol/L    Chloride 97 (L) 98 - 107 mmol/L    Bicarbonate 28 21 - 32 mmol/L    Anion Gap 10 10 - 20 mmol/L    Urea Nitrogen 43 (H) 6 - 23 mg/dL    Creatinine 2.03 (H) 0.50 - 1.30 mg/dL    eGFR 32 (L) >60 mL/min/1.73m*2    Calcium 7.9 (L) 8.6 - 10.3 mg/dL

## 2023-10-11 NOTE — DISCHARGE INSTRUCTIONS
Continue taking aspirin  Stop taking Eliquis until discussed with your PCP or regular cardiologist  You will need blood work on Wednesday 10/18/2023  Take Protonix daily  Your diuretics have changed to 80mg Lasix daily

## 2023-10-11 NOTE — CARE PLAN
The patient's goals for the shift include pt to be safe and comfortable throughout shift    The clinical goals for the shift include Pt will have no signs of bleeding by end of shift.    Over the shift, the patient did not make progress toward the following goals. Barriers to progression include ***. Recommendations to address these barriers include ***.

## 2023-10-17 NOTE — TELEPHONE ENCOUNTER
Call returned.  VCE shows gastric blood loss and repeat EGD with APC suggested by Dr. Herbert.  I agree and order placed.      Instructed to start oral iron and follow up with  CHF clinic for possible iron infusion as they had provided in the past.        ----- Message from Linh Marcelino RN sent at 10/17/2023 11:37 AM EDT -----  Regarding: Phone call for Capsule Endoscopy Results - Dr. Jerez patient  Hi Dr. Orona,    Can you please call patient's sister Lizzie at [phone].  He continues to have bleeding in ostomy bag and would like results from  Capsule Endoscopy.  Wondering what the next steps should be.    Thank you,    Linh

## 2023-10-18 PROBLEM — B35.1 ONYCHOMYCOSIS: Status: ACTIVE | Noted: 2023-01-01

## 2023-10-18 PROBLEM — L03.90 CELLULITIS: Status: ACTIVE | Noted: 2023-01-01

## 2023-10-18 PROBLEM — D50.9 IRON DEFICIENCY ANEMIA: Status: ACTIVE | Noted: 2023-01-01

## 2023-10-18 PROBLEM — I49.5 SICK SINUS SYNDROME (MULTI): Status: ACTIVE | Noted: 2023-01-01

## 2023-10-18 PROBLEM — Z95.1 HX OF CABG: Status: ACTIVE | Noted: 2023-01-01

## 2023-10-18 PROBLEM — I10 ESSENTIAL HYPERTENSION: Status: ACTIVE | Noted: 2023-01-01

## 2023-10-18 PROBLEM — N18.32 HYPERTENSIVE KIDNEY DISEASE WITH STAGE 3B CHRONIC KIDNEY DISEASE (MULTI): Status: ACTIVE | Noted: 2023-01-01

## 2023-10-18 PROBLEM — E78.5 DYSLIPIDEMIA: Status: ACTIVE | Noted: 2023-01-01

## 2023-10-18 PROBLEM — M62.549: Status: ACTIVE | Noted: 2017-07-18

## 2023-10-18 PROBLEM — N18.9 ACUTE RENAL FAILURE SUPERIMPOSED ON CHRONIC KIDNEY DISEASE (CMS-HCC): Status: ACTIVE | Noted: 2023-01-01

## 2023-10-18 PROBLEM — I25.9 CHRONIC ISCHEMIC HEART DISEASE: Status: ACTIVE | Noted: 2023-01-01

## 2023-10-18 PROBLEM — N17.9 ACUTE RENAL FAILURE SUPERIMPOSED ON CHRONIC KIDNEY DISEASE (CMS-HCC): Status: ACTIVE | Noted: 2023-01-01

## 2023-10-18 PROBLEM — I12.9 HYPERTENSIVE KIDNEY DISEASE WITH STAGE 3B CHRONIC KIDNEY DISEASE (MULTI): Status: ACTIVE | Noted: 2023-01-01

## 2023-10-18 NOTE — ASSESSMENT & PLAN NOTE
Bradycardia and frequent PVCs- asymptomatic now, monitor. Status post pacemaker because of symptomatic bradycardia

## 2023-10-18 NOTE — ASSESSMENT & PLAN NOTE
sustained ventricular tachycardia-we incidentally noted sustained ventricular tachycardia on routine pacemaker interrogation. Unclear if he was symptomatic with that. He is already on amiodarone. He did not tolerate beta-blocker so I am stopping this at this point. He is status post repeat left heart cath and PCI. I am arranging close follow-up with his electrophysiologist and discussed this with his electrophysiologist Dr Reynoso. He did not feel there is an immediate need for pacemaker upgrade or ICD, as the ventricular tachycardia was slow.

## 2023-10-18 NOTE — ASSESSMENT & PLAN NOTE
CAD- He had revascularization of left circumflex artery with a 2.5 mm resolute drug-eluting stent. He is symptom-free at this time. He is taking his medications as recommended. I restarted statins.

## 2023-10-18 NOTE — ASSESSMENT & PLAN NOTE
chronic systolic/diastolic heart failure-he has recently developed declining LV function to 45% associated with heart failure, initially was thought to be pacing mediated. Heart failure symptoms are under control with maximal medical therapy. He is not tolerating heart failure medications. Last LVEF normal. I stopped Entresto and Coreg. Bumex has been helping him. Additionally Jardiance plus Aldactone. Continue current regimen. Patient has ATTR amyloidosis and was started on Vyndamax with improvement of symptoms. Currently he has recurrence of symptoms because of iron deficiency anemia. Start iron infusions, continue p.o. iron, double Bumex for 3 to 4 days till euvolemic. Follow closely in CHF clinic.

## 2023-10-18 NOTE — ASSESSMENT & PLAN NOTE
dizziness and low blood pressure-stop Entresto stopped Coreg. Dizziness improved initially but then recurred after he developed bleeding and iron deficiency anemia.

## 2023-10-18 NOTE — ASSESSMENT & PLAN NOTE
>>ASSESSMENT AND PLAN FOR CAD (CORONARY ARTERY DISEASE) WRITTEN ON 10/18/2023 11:08 AM BY CONSTANTINO ESQUEDA LPN    CAD- He had revascularization of left circumflex artery with a 2.5 mm resolute drug-eluting stent. He is symptom-free at this time. He is taking his medications as recommended. I restarted statins.

## 2023-10-18 NOTE — ASSESSMENT & PLAN NOTE
Atrial fibrillation/flutter- He is now on amiodarone and waiting for ablation. He is maintaining sinus rhythm most of the time. We will check for amiodarone related toxicities. He is chronically anticoagulated, this had to be stopped because of recurrent GI bleed.  I advised her discussed with GI team if there is a reversible cause of GI bleed if so he may be able to resume anticoagulation otherwise I would recommend he considers Watchman device.

## 2023-10-19 PROBLEM — I50.43 ACUTE ON CHRONIC COMBINED SYSTOLIC AND DIASTOLIC CHF (CONGESTIVE HEART FAILURE) (MULTI): Status: RESOLVED | Noted: 2023-01-01 | Resolved: 2023-01-01

## 2023-10-19 PROBLEM — I44.1 MOBITZ TYPE I WENCKEBACH ATRIOVENTRICULAR BLOCK: Status: RESOLVED | Noted: 2023-01-01 | Resolved: 2023-01-01

## 2023-10-19 PROBLEM — I49.5 SICK SINUS SYNDROME (MULTI): Status: RESOLVED | Noted: 2023-01-01 | Resolved: 2023-01-01

## 2023-10-19 PROBLEM — R94.31 ABNORMAL EKG: Status: RESOLVED | Noted: 2023-01-01 | Resolved: 2023-01-01

## 2023-10-19 PROBLEM — R94.31: Status: RESOLVED | Noted: 2023-01-01 | Resolved: 2023-01-01

## 2023-10-19 PROBLEM — D61.818 PANCYTOPENIA (MULTI): Status: ACTIVE | Noted: 2023-01-01

## 2023-10-19 PROBLEM — I49.3 FREQUENT PVCS: Status: RESOLVED | Noted: 2023-01-01 | Resolved: 2023-01-01

## 2023-10-19 PROBLEM — R94.39 ABNORMAL STRESS TEST: Status: RESOLVED | Noted: 2023-01-01 | Resolved: 2023-01-01

## 2023-10-19 NOTE — PROGRESS NOTES
Chief Complaint:   Follow-up (6 month)     History Of Present Illness:    Michael Duran is a 83 y.o. male with history of coronary artery disease, status post bypass surgery several years ago, PCI to left circumflex artery in 2014, and more recently in 5/4/18 for chronic stable angina, inability to tolerate medicines. He received a 2.5 mm resolute Paxton drug-eluting stent to left circumflex. He had a preserved LV function, previously, now around 45%.    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   Somewhere is 2019/2020, he developed atrial flutter with slow ventricular response. He was asymptomatic initially, we still arranged a stress test for chronotropic response initially this was normal. The he slowly became symptomatic with shortness of breath. He was referred to Dr. Reynoso who performed a DC cardioversion, hoping that heart rate would improve after restoration of sinus rhythm. After the cardioversion he came in for an urgent evaluation. He was not feeling well, in fact worse after the cardioversion more short of breath with dizziness. His EKG showed sinus rhythm with prolonged first-degree AV delay occasional PVCs and IVCD. Heart rate is 65 bpm. Subsequently we detected Mobitz type I blockage. At that time given the symptoms of dizziness shortness of breath on exertion we decided to implant a pacemaker. He then started developing some heart failure symptoms. Because of recurrent atrial fibrillation he was started on amiodarone. He was having lot of dizziness/balance problems-after multiple investigations we concluded that his balance problems are due to vertigo. Currently this is slowly improving with physical therapy.  A more recent echo in spring 2022 showed that LVEF has declined to 45%. 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 In April 2022 it came to our attention on a routine pacemaker check that he  "had a sustained but slow ventricular tachycardia with a heart rate around 150 bpm. He was not admitted to the hospital with wide-complex tachycardia but turned out this was atrial tachycardia with tracking. Tracking was turned off. He was discharged home. At that point we decided to initiate evaluation for cardiac amyloidosis. Blood work and urine analysis was negative but PYP scan was strongly suggestive of ATTR amyloidosis. However an MRI that was concomitantly ordered by Dr. Stout does not support the diagnosis.  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    Most recently he has had multiple hospital admissions with GI bleed and severe anemia.  Status post multiple transfusions GI evaluation and subsequently oral anticoagulant therapy excepting aspirin was stopped.  He has follow-up with GI as well as with interventional team for Watchman device placement.    He still has substantial ankle swelling although improved from before.  He states that shortness of breath is at baseline.  Going regularly to the CHF clinic.  Reviewed lab work that reveals pancytopenia.     His ECG demonstrated atrial ventricular paced rhythm.    .     Last Recorded Vitals:  Vitals:    10/19/23 1133   BP: 122/78   Pulse: 71   Weight: 84.4 kg (186 lb)   Height: 1.727 m (5' 8\")       Past Medical History:  He has a past medical history of Atherosclerosis of coronary artery bypass graft(s) without angina pectoris (07/15/2016), Atherosclerosis of coronary artery bypass graft(s), unspecified, with other forms of angina pectoris (CMS/HCC) (04/22/2019), Benign prostatic hyperplasia without lower urinary tract symptoms, Other chest pain (04/01/2016), Other chest pain (09/17/2019), Other chest pain (05/11/2018), Personal history of other diseases of the circulatory system (04/01/2016), Personal history of other diseases of the digestive system, Personal history of other diseases of the musculoskeletal system " and connective tissue, Personal history of other diseases of the musculoskeletal system and connective tissue (12/07/2018), Personal history of other diseases of urinary system (06/21/2019), Personal history of other specified conditions (04/01/2016), and Personal history of other specified conditions (07/15/2016).    Past Surgical History:  He has a past surgical history that includes Rotator cuff repair (04/01/2016); Other surgical history (04/01/2016); Coronary artery bypass graft (04/01/2016); Cataract extraction (04/01/2016); Total knee arthroplasty (04/01/2016); Tonsillectomy (04/01/2016); Adenoidectomy (04/01/2016); Other surgical history (12/09/2021); Other surgical history (12/09/2021); Ileostomy (11/19/2022); Other surgical history (10/23/2018); Other surgical history (10/22/2018); CT guided percutaneous peritoneal or retroperitoneal fluid collection drainage (10/8/2018); US guided percutaneous peritoneal or retroperitoneal fluid collection drainage (10/1/2018); and US guided abscess drain (10/1/2018).      Social History:  He reports that he has never smoked. He has never used smokeless tobacco. He reports that he does not currently use alcohol. He reports that he does not use drugs.    Family History:  Family History   Problem Relation Name Age of Onset    Other (Cardiac disorder) Other Sibling         Allergies:  Ace inhibitors, Eptifibatide, Ezetimibe, Ezetimibe-simvastatin, Metoprolol, Pravastatin, and Rosuvastatin    Outpatient Medications:  Current Outpatient Medications   Medication Instructions    acetaminophen (Tylenol) 325 mg tablet oral, Every 6 hours PRN    ALPRAZolam (Xanax) 0.25 mg tablet oral, Every 8 hours PRN    amiodarone (PACERONE) 200 mg, oral, Daily    aspirin 81 mg, oral, Daily    empagliflozin (Jardiance) 25 mg 1 tablet, oral, Daily    eplerenone (INSPRA) 25 mg, oral, Daily    fluticasone (Flonase) 50 mcg/actuation nasal spray 1 spray, Each Nostril, Daily    furosemide (LASIX) 80  mg, oral, Daily    levothyroxine (SYNTHROID, LEVOXYL) 25 mcg, oral, Daily    naloxone (Narcan) 4 mg/0.1 mL nasal spray nasal    pantoprazole (PROTONIX) 40 mg, oral, Daily, Do not crush, chew, or split.    potassium chloride CR 10 mEq ER tablet     tafamidis (Vyndamax) 61 mg capsule 1 capsule, oral, Daily    tamsulosin (FLOMAX) 0.4 mg, oral, Daily    traMADol (ULTRAM) 50 mg, oral, 2 times daily    zolpidem (Ambien) 5 mg tablet TAKE 1 TABLET BY MOUTH EVERY DAY AT BEDTIME FOR 30 DAYS       Physical Exam:  Physical Exam  Vitals reviewed.   Constitutional:       Appearance: Normal appearance.   Neck:      Vascular: No carotid bruit or JVD.   Cardiovascular:      Rate and Rhythm: Normal rate and regular rhythm.      Heart sounds: Normal heart sounds, S1 normal and S2 normal. No murmur heard.  Pulmonary:      Effort: Pulmonary effort is normal.      Breath sounds: Normal breath sounds.   Abdominal:      General: Abdomen is flat. Bowel sounds are normal.      Palpations: Abdomen is soft.   Musculoskeletal:      Right lower le+ Pitting Edema present.      Left lower le+ Pitting Edema present.   Skin:     General: Skin is warm.   Neurological:      Mental Status: He is alert. Mental status is at baseline.   Psychiatric:         Mood and Affect: Mood normal.         Behavior: Behavior normal.           Last Labs:  CBC -  Lab Results   Component Value Date    WBC 2.9 (L) 10/17/2023    HGB 7.6 (L) 10/17/2023    HCT 25.2 (L) 10/17/2023     (H) 10/17/2023     (L) 10/17/2023       CMP -  Lab Results   Component Value Date    CALCIUM 8.4 (L) 10/17/2023    PHOS 3.7 10/17/2023    PROT 7.1 10/08/2023    ALBUMIN 3.0 (L) 10/17/2023    AST 50 (H) 10/08/2023    ALT 24 10/08/2023    ALKPHOS 88 10/08/2023    BILITOT 1.0 10/08/2023       LIPID PANEL -   Lab Results   Component Value Date    CHOL 99 2023    TRIG 105 2023    HDL 19.5 (A) 2023    CHHDL 5.1 (A) 2023    LDLF 59 2023    VLDL 21  03/29/2023    NHDL CANCELED 03/29/2023       RENAL FUNCTION PANEL -   Lab Results   Component Value Date    GLUCOSE 105 (H) 10/17/2023     (L) 10/17/2023    K 4.7 10/17/2023     10/17/2023    CO2 26 10/17/2023    ANIONGAP 12 10/17/2023    BUN 45 (H) 10/17/2023    CREATININE 2.04 (H) 10/17/2023    GFRMALE 30 (A) 09/25/2023    CALCIUM 8.4 (L) 10/17/2023    PHOS 3.7 10/17/2023    ALBUMIN 3.0 (L) 10/17/2023        Lab Results   Component Value Date     (H) 10/07/2023    HGBA1C 4.7 06/04/2023       Assessment/Plan   Problem List Items Addressed This Visit             ICD-10-CM    Presence of permanent cardiac pacemaker Z95.0     Normal function follows with pacer clinic.         Relevant Orders    Lipid panel    Alanine Aminotransferase    Aspartate Aminotransferase    TSH with reflex to Free T4 if abnormal    Follow Up In Cardiology    Post PTCA Z98.61     Reinitiate low-dose statin.  Continue rest of cardiac medications.  He does not tolerate beta-blockers only on aspirin at this time.         Relevant Medications    rosuvastatin (Crestor) 5 mg tablet    Other Relevant Orders    Lipid panel    Alanine Aminotransferase    Aspartate Aminotransferase    TSH with reflex to Free T4 if abnormal    Follow Up In Cardiology    Paroxysmal atrial fibrillation (CMS/[facility]) - Primary I48.0      Atrial fibrillation/flutter- He is now on amiodarone and waiting for ablation. He is maintaining sinus rhythm most of the time. We will check for amiodarone related toxicities. He is chronically anticoagulated, this had to be stopped because of recurrent GI bleed.  I advised her discussed with GI team if there is a reversible cause of GI bleed if so he may be able to resume anticoagulation otherwise I would recommend he considers Watchman device.         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, I43    Relevant Orders    Lipid panel    Alanine Aminotransferase    Aspartate Aminotransferase    TSH with reflex to Free T4 if abnormal    Follow Up In Cardiology    Atrial flutter (CMS/[facility]) I48.92    Relevant Orders    Lipid panel    Alanine Aminotransferase    Aspartate Aminotransferase    TSH with reflex to Free T4 if abnormal    Follow Up In Cardiology    Atherosclerotic heart disease of native coronary artery without angina pectoris I25.10     >>ASSESSMENT AND PLAN FOR CAD (CORONARY ARTERY DISEASE) WRITTEN ON 10/18/2023 11:08 AM BY CONSTANTINO ESQUEDA LPN    CAD- He had revascularization of left circumflex artery with a 2.5 mm resolute drug-eluting stent. He is symptom-free at this time. He is taking his medications as recommended. I restarted statins.         Relevant Medications    rosuvastatin (Crestor) 5 mg tablet    Other Relevant Orders    Lipid panel    Alanine Aminotransferase    Aspartate Aminotransferase    TSH with reflex to Free T4 if abnormal    Follow Up In Cardiology    Pancytopenia (CMS/[facility]) D61.818    Relevant Orders    Referral to Hematology and Oncology    Lipid panel    Alanine Aminotransferase    Aspartate Aminotransferase    TSH with reflex to Free T4 if abnormal    Follow Up In Cardiology    High risk medications (not anticoagulants) long-term use Z79.899     Chronically on amiodarone.  Reviewed recent labs, within normal limits.  Will check chest x-ray.  Continue to monitor.         Relevant Orders    Lipid panel    Alanine Aminotransferase    Aspartate Aminotransferase    TSH with reflex to Free T4 if abnormal    XR chest 2 views    Follow Up In Cardiology    Chronic ischemic heart  disease I25.9    Dyslipidemia E78.5    Essential hypertension I10    Relevant Orders    Lipid panel    Alanine Aminotransferase    Aspartate Aminotransferase    TSH with reflex to Free T4 if abnormal    Follow Up In Cardiology    Hx of CABG Z95.1    Relevant Medications    rosuvastatin (Crestor) 5 mg tablet    Other Relevant Orders    Lipid panel    Alanine Aminotransferase    Aspartate Aminotransferase    TSH with reflex to Free T4 if abnormal    Follow Up In Cardiology         Bev Alston MD

## 2023-10-19 NOTE — ASSESSMENT & PLAN NOTE
Chronically on amiodarone.  Reviewed recent labs, within normal limits.  Will check chest x-ray.  Continue to monitor.

## 2023-10-22 NOTE — DOCUMENTATION CLARIFICATION NOTE
PATIENT:               [facility]OS AMIN  [id number]  MRN:                       [id number]  :                       1940  ADMIT DATE:       10/7/2023 10:24 AM  DISCH DATE:        10/11/2023 3:20 PM  RESPONDING PROVIDER #:        [id number]          PROVIDER RESPONSE TEXT:    acute blood loss anemia    CDI QUERY TEXT:    [facility]_Generic          Instruction:    Based on your assessment of the patient and the clinical information, please provide the most likely cause of the GI bleed by clicking on the appropriate radio button and enter any additional information if prompted.    Question: Please further clarify after study the GI bleed    [Gastritis :: Gastritis ]]    When answering this query, please exercise your independent professional judgment. The fact that a question is being asked, does not imply that any particular answer is desired or expected.    The patient's clinical indicators include:  Clinical Information:--GI bleeding, he was admitted with maroon stool, upon admission his hemoglobin is less than 7, he was transfused, he had to be transfused couple of times, he was evaluated by the gastroenterology he had EGD and colonoscopy as an outpatient so he had capsule endoscopy done and his Eliquis was on hold-- per discharge summary    Clinical Indicators:  -Procedure information and findings-.....gastritis was noted. 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

## 2023-10-22 NOTE — DOCUMENTATION CLARIFICATION NOTE
PATIENT:               [facility] CARLOS AMIN  [id number]  MRN:                       [id number]  :                       1940  ADMIT DATE:       10/7/2023 10:24 AM  DISCH DATE:        10/11/2023 3:20 PM  RESPONDING PROVIDER #:        [id number]          PROVIDER RESPONSE TEXT:    blood loss anemia is acute    CDI QUERY TEXT:    [facility]_Acuity of Condition        Instruction:  Based on your assessment of the patient and the clinical information, please provide the requested documentation by clicking on the appropriate radio button and enter any additional information if prompted.    Question: Please further clarify the acuity of the blood loss anemia      When answering this query, please exercise your independent professional judgment. The fact that a question is being asked, does not imply that any particular answer is desired or expected.      The patient's clinical indicators include:  Clinical Information:  -GI bleeding, he was admitted with maroon stool, upon admission his hemoglobin is less than 7,-    Clinical Indicators:  -hemoglobin- 6.1, 7.3. 7, 8.6-- 10/7/23-10/10/23    Treatment:  -he was transfused, he had to be transfused couple of times- per discharge summary    Risk Factors:  -blood loss anemia- per capsule endoscopy reason for referral  -occult blood, stool x1 positive- labs 10/8/23  Options provided:  -- blood loss anemia is acute  -- blood loss anemia is acute on chronic  -- Other - I will add my own diagnosis  -- Refer to Clinical Documentation Reviewer    Query created by: Mary Barbosa on 10/15/2023 9:43 PM      Electronically signed by:  ESME VELAZCO MD 10/22/2023 6:16 PM

## 2023-10-23 NOTE — PROGRESS NOTES
Subjective   Patient ID: Michael Duran is a 83 y.o. male who presents for follow-up of congestive heart failure.     Current Outpatient Medications:     acetaminophen (Tylenol) 325 mg tablet, Take by mouth every 6 hours if needed., Disp: , Rfl:     ALPRAZolam (Xanax) 0.25 mg tablet, Take by mouth every 8 hours if needed., Disp: , Rfl:     amiodarone (Pacerone) 200 mg tablet, Take 1 tablet (200 mg) by mouth once daily., Disp: , Rfl:     aspirin 81 mg chewable tablet, Chew 1 tablet (81 mg) once daily. Do not start before October 12, 2023., Disp: 30 tablet, Rfl: 0    empagliflozin (Jardiance) 25 mg, Take 1 tablet (25 mg) by mouth once daily., Disp: , Rfl:     eplerenone (Inspra) 25 mg tablet, Take 1 tablet (25 mg) by mouth once daily., Disp: , Rfl:     furosemide (Lasix) 80 mg tablet, Take 1 tablet (80 mg) by mouth once daily., Disp: 30 tablet, Rfl: 0    levothyroxine (Synthroid, Levoxyl) 25 mcg tablet, Take 1 tablet (25 mcg) by mouth once daily., Disp: , Rfl:     naloxone (Narcan) 4 mg/0.1 mL nasal spray, Administer into affected nostril(s)., Disp: , Rfl:     pantoprazole (ProtoNix) 40 mg EC tablet, Take 1 tablet (40 mg) by mouth once daily. Do not crush, chew, or split., Disp: 30 tablet, Rfl: 0    potassium chloride CR 10 mEq ER tablet, , Disp: , Rfl:     rosuvastatin (Crestor) 5 mg tablet, Take 1 tablet (5 mg) by mouth once daily., Disp: 30 tablet, Rfl: 11    tafamidis (Vyndamax) 61 mg capsule, Take 1 capsule (61 mg) by mouth once daily., Disp: , Rfl:     tamsulosin (Flomax) 0.4 mg 24 hr capsule, Take 1 capsule (0.4 mg) by mouth once daily., Disp: , Rfl:     traMADol (Ultram) 50 mg tablet, Take 1 tablet (50 mg) by mouth 2 times a day., Disp: , Rfl:     zolpidem (Ambien) 5 mg tablet, TAKE 1 TABLET BY MOUTH EVERY DAY AT BEDTIME FOR 30 DAYS, Disp: , Rfl:      Ken presents today for follow-up in heart failure clinic.  He was just released a week and a half ago from the hospital for acute on chronic heart failure.   Continues to have multiple admissions for heart failure.  Believe this is being driven by the chronic blood loss anemia in part.  While in hospital his diuretic regimen was changed and the diuretic dose was reduced.  He presents today with significant weight gain he is complaining of swelling in the lower extremities and in the genital area.  Has had more shortness of breath.  Is orthopneic.  JVD to the angle of the jaw.  Does have follow-up with follow-up with GI to try and cauterize the areas where he is losing blood.  Oral anticoagulation was stopped.  He remains on aspirin daily.      Review of Systems   Constitutional:  Negative for activity change, chills and fever.   HENT:  Negative for hearing loss.    Eyes: Negative.    Respiratory:  Positive for shortness of breath. Negative for cough, chest tightness and wheezing.    Cardiovascular:  Positive for leg swelling. Negative for chest pain and palpitations.   Gastrointestinal:  Positive for blood in stool. Negative for abdominal distention.   Genitourinary:  Positive for scrotal swelling. Negative for hematuria.   Neurological:  Negative for syncope, weakness and light-headedness.   Psychiatric/Behavioral:  Negative for confusion.        Objective     /68 (BP Location: Right arm, Patient Position: Sitting)   Pulse 72   Resp 22   Wt 86.8 kg (191 lb 6.4 oz)   SpO2 100%   BMI 29.10 kg/m²     April 2023 Echocardiogram CONCLUSIONS:  1. Left ventricular systolic function is mildly decreased with a 45% estimated ejection fraction.  2. Spectral Doppler shows a restrictive pattern of left ventricular diastolic filling.  3. There is severe concentric left ventricular hypertrophy.  4. There is low normal right ventricular systolic function.  5. The left atrium is severely dilated.  6. The right atrium is moderately to severely dilated.  7. Mild to moderate mitral valve regurgitation.  8. Moderate tricuspid regurgitation.  9. Moderately elevated right  ventricular systolic pressure.  10. There is global hypokinesis of the left ventricle with minor regional variations.       Lab Results   Component Value Date    BUN 45 (H) 10/17/2023    CREATININE 2.04 (H) 10/17/2023     (H) 10/07/2023    MG 2.31 10/05/2023    K 4.7 10/17/2023     (L) 10/17/2023       Physical Exam  Constitutional:       Appearance: He is not toxic-appearing.   HENT:      Head: Normocephalic.      Nose: Nose normal.   Eyes:      Conjunctiva/sclera: Conjunctivae normal.   Neck:      Comments: JVD to angle of the jaw  Cardiovascular:      Rate and Rhythm: Normal rate and regular rhythm.      Pulses: Normal pulses.      Heart sounds: Normal heart sounds. No murmur heard.  Pulmonary:      Effort: Pulmonary effort is normal. No respiratory distress.      Breath sounds: No wheezing, rhonchi or rales.      Comments: Breath sounds are diminished posteriorly   Abdominal:      General: Bowel sounds are normal.      Palpations: Abdomen is soft.   Genitourinary:     Comments: Patient reports swelling in the genital area  Musculoskeletal:         General: Swelling present.   Skin:     General: Skin is warm and dry.   Neurological:      General: No focal deficit present.      Mental Status: He is alert and oriented to person, place, and time.   Psychiatric:         Mood and Affect: Mood normal.         Assessment/Plan     Problem List Items Addressed This Visit    None       Chronic systolic /diastolic heart failure:   Etiology: Ischemic/cardiac amyloidosis   AHA Stage: C   NYHA class: 3b-4   Volume Status:  fluid congested.   GFR: 32    GDMT:  BB- ( amiodarone 200 mg daily )   ARB/ACEI/ARNI -   MRA - Eplerenone 25 mg 1 tablet daily   SGLT2i - Jardiance 25 mg 1 tablet daily   Diuretic -  Lasix 80 mg 1 tablet daily   Device Therapy:  PPM in situ     CHF: Has significantly fluid congested today.  He had a significant weight gain as noted.  Systolic blood pressure 145 mm Hg. will receive IV Lasix 80 mg  today.  We will check a basic metabolic panel CBC.  He will return tomorrow for reevaluation and likely have IV Lasix again tomorrow.  We reviewed sodium and fluid restrictions.  We will likely need to readjust his diuretic outpatient therapy.    2. Cardiac Amyloidosis : Vyndamax    3. ASHD:  no angina.  Continue statin/ASA     4.  CKD3:  Last BUN/CR 45/20.4    5. Chronic blood loss anemia:  Check CBC today.      6. PAF:  OAC stopped due to chronic GI blood loss.  He is now on ASA.         Kelli Santiago, APRN-CNP

## 2023-10-23 NOTE — PATIENT INSTRUCTIONS
Thank you for coming in today.  If you have any questions you may contact the office Monday through Friday at [phone] or on week ends at [phone].     You have received IV Lasix today.   Follow up tomorrow     Please follow  a 2 GM sodium diet and limit fluid intake to 2 liters per day or 8 servings ( serving size = 8 oz. = 1 cup = 240 ml) per day.   Please avoid processed meat products (luncheon meats, sausages, andino, hot dogs for example) eat 4 servings of vegetables and 1-2 whole servings of whole fruits per day.   Please weigh daily and call [phone] for weight gain of 3 pounds in 24 hours or 5 pounds or if you experience increased swelling or shortness of breath.     Follow up on Thursday.

## 2023-10-24 NOTE — PATIENT INSTRUCTIONS
Thank you for coming in today.  If you have any questions you may contact the office Monday through Friday at [phone] or on week ends at [phone].     Please take the Furosemide 80 mg twice a day tomorrow.     Please follow  a 2 GM sodium diet and limit fluid intake to 2 liters per day or 8 servings ( serving size = 8 oz. = 1 cup = 240 ml) per day.   Please avoid processed meat products (luncheon meats, sausages, andino, hot dogs for example) eat 4 servings of vegetables and 1-2 whole servings of whole fruits per day.   Please weigh daily and call [phone] for weight gain of 3 pounds in 24 hours or 5 pounds or if you experience increased swelling or shortness of breath.     Follow up on Thursday.

## 2023-10-24 NOTE — PROGRESS NOTES
Subjective   Patient ID: Michael Duran is a 83 y.o. male who presents for follow-up of congestive heart failure.     Current Outpatient Medications:     acetaminophen (Tylenol) 325 mg tablet, Take by mouth every 6 hours if needed., Disp: , Rfl:     ALPRAZolam (Xanax) 0.25 mg tablet, Take by mouth every 8 hours if needed., Disp: , Rfl:     amiodarone (Pacerone) 200 mg tablet, Take 1 tablet (200 mg) by mouth once daily., Disp: , Rfl:     aspirin 81 mg chewable tablet, Chew 1 tablet (81 mg) once daily. Do not start before October 12, 2023., Disp: 30 tablet, Rfl: 0    empagliflozin (Jardiance) 25 mg, Take 1 tablet (25 mg) by mouth once daily., Disp: , Rfl:     eplerenone (Inspra) 25 mg tablet, Take 1 tablet (25 mg) by mouth once daily., Disp: , Rfl:     furosemide (Lasix) 80 mg tablet, Take 1 tablet (80 mg) by mouth once daily., Disp: 30 tablet, Rfl: 0    levothyroxine (Synthroid, Levoxyl) 25 mcg tablet, Take 1 tablet (25 mcg) by mouth once daily., Disp: , Rfl:     naloxone (Narcan) 4 mg/0.1 mL nasal spray, Administer into affected nostril(s)., Disp: , Rfl:     pantoprazole (ProtoNix) 40 mg EC tablet, Take 1 tablet (40 mg) by mouth once daily. Do not crush, chew, or split., Disp: 30 tablet, Rfl: 0    potassium chloride CR 10 mEq ER tablet, , Disp: , Rfl:     rosuvastatin (Crestor) 5 mg tablet, Take 1 tablet (5 mg) by mouth once daily., Disp: 30 tablet, Rfl: 11    tafamidis (Vyndamax) 61 mg capsule, Take 1 capsule (61 mg) by mouth once daily., Disp: , Rfl:     tamsulosin (Flomax) 0.4 mg 24 hr capsule, Take 1 capsule (0.4 mg) by mouth once daily., Disp: , Rfl:     traMADol (Ultram) 50 mg tablet, Take 1 tablet (50 mg) by mouth 2 times a day., Disp: , Rfl:     zolpidem (Ambien) 5 mg tablet, TAKE 1 TABLET BY MOUTH EVERY DAY AT BEDTIME FOR 30 DAYS, Disp: , Rfl:   No current facility-administered medications for this visit.     HPI   Current symptoms include: dyspnea, lower extremity edema, orthopnea, and paroxysmal  nocturnal dyspnea. He denies chest pressure/discomfort, irregular heart beat, near-syncope, palpitations, and syncope. He states he is compliant all of the time with his medications. He states he is noncompliant some of the time with his diet.    Review of Systems   Constitutional:  Negative for activity change, chills and fever.   HENT:  Negative for hearing loss.    Eyes: Negative.    Respiratory:  Positive for shortness of breath. Negative for cough, chest tightness and wheezing.         Orthopnea sleeping in a chair at night   Cardiovascular:  Positive for leg swelling. Negative for chest pain and palpitations.   Gastrointestinal:  Negative for abdominal distention and blood in stool.   Genitourinary:  Negative for hematuria.   Neurological:  Negative for syncope, weakness and light-headedness.   Psychiatric/Behavioral:  Negative for confusion.        Objective     /55 (BP Location: Right arm, Patient Position: Sitting)   Pulse 84   Resp 20   Wt 87.9 kg (193 lb 11.2 oz)   SpO2 98%   BMI 29.45 kg/m²     April 2023 Echocardiogram CONCLUSIONS:  1. Left ventricular systolic function is mildly decreased with a 45% estimated ejection fraction.  2. Spectral Doppler shows a restrictive pattern of left ventricular diastolic filling.  3. There is severe concentric left ventricular hypertrophy.  4. There is low normal right ventricular systolic function.  5. The left atrium is severely dilated.  6. The right atrium is moderately to severely dilated.  7. Mild to moderate mitral valve regurgitation.  8. Moderate tricuspid regurgitation.  9. Moderately elevated right ventricular systolic pressure.  10. There is global hypokinesis of the left ventricle with minor regional variations.          Lab Results   Component Value Date    BUN 56 (H) 10/23/2023    CREATININE 2.04 (H) 10/23/2023     (H) 10/07/2023    MG 2.31 10/05/2023    K 4.2 10/23/2023     (L) 10/23/2023       Physical Exam  Constitutional:        Appearance: He is not toxic-appearing.      Comments: Weight is up 2 pounds   HENT:      Head: Normocephalic.      Nose: Nose normal.   Eyes:      Conjunctiva/sclera: Conjunctivae normal.   Neck:      Comments: JVD to angle of the Jaw  Cardiovascular:      Rate and Rhythm: Normal rate and regular rhythm.      Pulses: Normal pulses.      Heart sounds: No murmur heard.  Pulmonary:      Effort: Pulmonary effort is normal. No respiratory distress.      Breath sounds: Wheezing and rhonchi present. No rales.   Abdominal:      General: Bowel sounds are normal.   Musculoskeletal:         General: Swelling present.      Comments: Edema of the bilateral lower extremities extending to 5   Skin:     General: Skin is warm and dry.   Neurological:      General: No focal deficit present.      Mental Status: He is alert and oriented to person, place, and time.   Psychiatric:         Mood and Affect: Mood normal.         Assessment/Plan     Problem List Items Addressed This Visit          Cardiac and Vasculature    Chronic systolic heart failure (CMS/HCC) - Primary        Chronic combined systolic/diastolic heart failure   Etiology: ischemic   AHA Stage: C   NYHA class: 3-4   Volume Status: Volume overloaded   GFR: 32    GDMT:  BB-  ( amiodarone)   ARB/ACEI/ARNI -   MRA - eplerenone   SGLT2i - jardiance  Diuretic - furosemide  Device Therapy:     CHF: Patient presents for follow-up in heart failure clinic received IV Lasix yesterday his weight is up 2 pounds his symptoms are not significantly improved.  Blood pressure stable.  We will trial a dose of Zaroxolyn and repeat the IV Lasix today.  If he continues to have symptoms that are not improved or worsening he needs to present to the emergency room.  Verbalizes understanding of instructions.  Otherwise he will take the oral Lasix 80 mg twice a day tomorrow and follow-up for further evaluation on Thursday.    2. CKD3 - stable per labs yesterday     3. ASHD:  No angina  STATIN/ASA    4. Chronic blood loss anemia:  Hb/ Hct - 7.3/23.1    5. PAF:  Stopped due to Chronic GIB.  Currently on ASA only        Kelli Santiago, APRN-CNP

## 2023-10-26 PROBLEM — K92.2 GASTROINTESTINAL HEMORRHAGE, UNSPECIFIED GASTROINTESTINAL HEMORRHAGE TYPE: Status: ACTIVE | Noted: 2023-01-01

## 2023-10-26 NOTE — PROGRESS NOTES
Subjective   Patient ID: Michael Duran is a 83 y.o. male who presents for follow-up of congestive heart failure.     Current Outpatient Medications:     acetaminophen (Tylenol) 325 mg tablet, Take by mouth every 6 hours if needed., Disp: , Rfl:     ALPRAZolam (Xanax) 0.25 mg tablet, Take by mouth every 8 hours if needed., Disp: , Rfl:     amiodarone (Pacerone) 200 mg tablet, Take 1 tablet (200 mg) by mouth once daily., Disp: , Rfl:     aspirin 81 mg chewable tablet, Chew 1 tablet (81 mg) once daily. Do not start before October 12, 2023., Disp: 30 tablet, Rfl: 0    empagliflozin (Jardiance) 25 mg, Take 1 tablet (25 mg) by mouth once daily., Disp: , Rfl:     eplerenone (Inspra) 25 mg tablet, Take 1 tablet (25 mg) by mouth once daily., Disp: , Rfl:     furosemide (Lasix) 80 mg tablet, Take 1 tablet (80 mg) by mouth once daily., Disp: 30 tablet, Rfl: 0    levothyroxine (Synthroid, Levoxyl) 25 mcg tablet, Take 1 tablet (25 mcg) by mouth once daily., Disp: , Rfl:     naloxone (Narcan) 4 mg/0.1 mL nasal spray, Administer into affected nostril(s)., Disp: , Rfl:     pantoprazole (ProtoNix) 40 mg EC tablet, Take 1 tablet (40 mg) by mouth once daily. Do not crush, chew, or split., Disp: 30 tablet, Rfl: 0    potassium chloride CR 10 mEq ER tablet, , Disp: , Rfl:     rosuvastatin (Crestor) 5 mg tablet, Take 1 tablet (5 mg) by mouth once daily., Disp: 30 tablet, Rfl: 11    tafamidis (Vyndamax) 61 mg capsule, Take 1 capsule (61 mg) by mouth once daily., Disp: , Rfl:     tamsulosin (Flomax) 0.4 mg 24 hr capsule, Take 1 capsule (0.4 mg) by mouth once daily., Disp: , Rfl:     traMADol (Ultram) 50 mg tablet, Take 1 tablet (50 mg) by mouth 2 times a day., Disp: , Rfl:     zolpidem (Ambien) 5 mg tablet, TAKE 1 TABLET BY MOUTH EVERY DAY AT BEDTIME FOR 30 DAYS, Disp: , Rfl:      Patient presents today for follow-up in heart failure clinic.  He presented earlier this week with significantly worsening heart failure symptoms including  edema into the groin JVD to the angle of the jaw shortness of breath orthopnea and PND.  He was just released from [facility] about a week and a half ago for acute on chronic heart failure.  This was after hospitalization at [facility] for persistent GI bleeding.  Patient has a known history of colon cancer.  He has chronic persistent blood loss anemia.  Anticoagulants were discontinued except for aspirin at [facility] due to the ongoing issues with GI bleed.  He has stage III renal insufficiency.  Today on exam he has had a modicum of improvement in his symptoms.  He has received IV Lasix on Monday and again on Tuesday with Zaroxolyn.  Weight is unchanged from Tuesday.  He states that the breathing seems to be better today he continues to have edema in the lower extremities which is mildly improved.  Lungs are clear.  He has no chest pain or palpitations he denies focal neurological change dizziness near doug syncope.         Review of Systems   Constitutional:  Negative for activity change, chills and fever.   HENT:  Negative for hearing loss.    Eyes: Negative.    Respiratory:  Positive for shortness of breath. Negative for cough, chest tightness and wheezing.    Cardiovascular:  Positive for leg swelling. Negative for chest pain and palpitations.   Gastrointestinal:  Positive for blood in stool. Negative for abdominal distention.        Patient has colon cancer and has a colostomy from which she has had recurrent bleeding   Genitourinary:  Negative for hematuria.   Neurological:  Positive for weakness. Negative for syncope and light-headedness.   Psychiatric/Behavioral:  Negative for confusion.        Objective     /62 (BP Location: Right arm, Patient Position: Sitting)   Pulse 77   Resp 20   Wt 87.7 kg (193 lb 6.4 oz)   SpO2 98%   BMI 29.41 kg/m²     April 2023 Echocardiogram CONCLUSIONS:  1. Left ventricular systolic function is mildly decreased with a 45% estimated  ejection fraction.  2. Spectral Doppler shows a restrictive pattern of left ventricular diastolic filling.  3. There is severe concentric left ventricular hypertrophy.  4. There is low normal right ventricular systolic function.  5. The left atrium is severely dilated.  6. The right atrium is moderately to severely dilated.  7. Mild to moderate mitral valve regurgitation.  8. Moderate tricuspid regurgitation.  9. Moderately elevated right ventricular systolic pressure.  10. There is global hypokinesis of the left ventricle with minor regional variations    Lab Results   Component Value Date    BUN 63 (H) 10/24/2023    CREATININE 2.21 (H) 10/24/2023     (H) 10/07/2023    MG 2.28 10/24/2023    K 4.6 10/24/2023     (L) 10/24/2023       Physical Exam  Constitutional:       Appearance: He is ill-appearing.   HENT:      Nose: Nose normal.   Eyes:      Conjunctiva/sclera: Conjunctivae normal.   Neck:      Comments: JVD to the angle of the jaw  Cardiovascular:      Rate and Rhythm: Normal rate and regular rhythm.      Heart sounds: No murmur heard.  Pulmonary:      Effort: Pulmonary effort is normal.      Breath sounds: Normal breath sounds.   Abdominal:      General: Bowel sounds are normal.      Comments: He has semifirm distention in the lower abdomen.  Non tender.    Musculoskeletal:         General: Swelling present.      Comments: Edema is mildly improved.   Neurological:      General: No focal deficit present.      Mental Status: He is alert.   Psychiatric:         Mood and Affect: Mood normal.         Assessment/Plan     Problem List Items Addressed This Visit          Cardiac and Vasculature    Paroxysmal atrial fibrillation (CMS/HCC)    Chronic systolic heart failure (CMS/HCC) - Primary    Essential hypertension        Combined systolic/diastolic heart failure   Etiology: ischemic   AHA Stage: C   NYHA class: 3-4   Volume Status: Volume overloaded   GFR: 29     GDMT:  BB-  ( amiodarone)    ARB/ACEI/ARNI -   MRA - eplerenone   SGLT2i - jardiance  Diuretic - furosemide  Device Therapy:      CHF: Patient presents for follow-up in heart failure clinic.  He has received IV Lasix X 2 days  with incremental improvement in sx.  Weight is still 163 #.  He reports taking medications as directed and following sodium and fluid restrictions.  Will check the BMP/BNP/ CBC today.  If acceptable will trial one more day of IV diuretic as OP.  If worsening anemia or worsening renal function then he will be referred to ER for further evaluation and likely hospitalization.  Patient's prognosis is poor given the multiple acute ongoing illnesses for which treatments have not been effective.  It may be appropriate to begin discussion concerning palliative care options.   2. CKD3 -  He has had a bump in the Cr. With IV Lasix.      3. ASHD:  No angina STATIN/ASA     4. Chronic blood loss anemia:  Hb/ Hct - 7.3/23.1 last check.  He has had recurrent bleeding over the past 24 hours      5. PAF:   Currently on ASA only due to ongoing GIB.         SOFÍA Arteaga-CNP        Addendum:  Hb 6.7   Cr 2.62.  Patient sent to ER.

## 2023-10-26 NOTE — PATIENT INSTRUCTIONS
Thank you for coming in today.  If you have any questions you may contact the office Monday through Friday at [phone] or on week ends at [phone].       Please follow  a 2 GM sodium diet and limit fluid intake to 2 liters per day or 8 servings ( serving size = 8 oz. = 1 cup = 240 ml) per day.   Please avoid processed meat products (luncheon meats, sausages, andino, hot dogs for example) eat 4 servings of vegetables and 1-2 whole servings of whole fruits per day.   Please weigh daily and call [phone] for weight gain of 3 pounds in 24 hours or 5 pounds or if you experience increased swelling or shortness of breath.         Follow up:

## 2023-10-26 NOTE — H&P
History Of Present Illness  Michael Duran is a 83 y.o. male with PMH of CKD, CAD s/p prior CABG, PCI, A-fib no longer on AC, PPM, CHF, HTN, DLD, COPD with no baseline oxygen requirement, prior rectal cancer s/p proctocolectomy with colostomy, recurrent GI bleeds, who presents with anemia.  History obtained by patient and daughter at bedside.  States that he was having an outpatient follow-up with the heart failure clinic today for increased edema.  Lab work-up demonstrated worsening anemia with hemoglobin of 6.7 and he was sent to the ED.  Per patient and daughter he has been having bloody output in his colostomy for the past several weeks, at times dark black and at other times more red appearing.  Does report some weakness, shortness of breath.  Denies any chest pain, nausea, vomiting, abdominal pain.  Was seen at [facility] for similar symptoms earlier this month.  His Eliquis was stopped and he is scheduled for EGD next week for further evaluation.    ED course: Afebrile, HR 70, RR 18, BP 98/57, pulse ox 98% on room air.  WBC 4.2, Hgb 6.7, platelets 130.  INR 1.2.  Chemistries remarkable for creatinine 2.62, BUN 68.  Patient was ordered for 1 unit RBC transfusion.  Decision made for admission.      Past Medical History  He has a past medical history of Atherosclerosis of coronary artery bypass graft(s) without angina pectoris (07/15/2016), Atherosclerosis of coronary artery bypass graft(s), unspecified, with other forms of angina pectoris (CMS/[facility]) (04/22/2019), Benign prostatic hyperplasia without lower urinary tract symptoms, Other chest pain (04/01/2016), Other chest pain (09/17/2019), Other chest pain (05/11/2018), Personal history of other diseases of the circulatory system (04/01/2016), Personal history of other diseases of the digestive system, Personal history of other diseases of the musculoskeletal system and connective tissue, Personal history of other diseases of the musculoskeletal system and  connective tissue (12/07/2018), Personal history of other diseases of urinary system (06/21/2019), Personal history of other specified conditions (04/01/2016), and Personal history of other specified conditions (07/15/2016).    Surgical History  He has a past surgical history that includes Rotator cuff repair (04/01/2016); Other surgical history (04/01/2016); Coronary artery bypass graft (04/01/2016); Cataract extraction (04/01/2016); Total knee arthroplasty (04/01/2016); Tonsillectomy (04/01/2016); Adenoidectomy (04/01/2016); Other surgical history (12/09/2021); Other surgical history (12/09/2021); Ileostomy (11/19/2022); Other surgical history (10/23/2018); Other surgical history (10/22/2018); CT guided percutaneous peritoneal or retroperitoneal fluid collection drainage (10/8/2018); US guided percutaneous peritoneal or retroperitoneal fluid collection drainage (10/1/2018); and US guided abscess drain (10/1/2018).     Social History  He reports that he has quit smoking. His smoking use included cigarettes. He has never used smokeless tobacco. He reports that he does not currently use alcohol. He reports that he does not use drugs.    Family History  Family History   Problem Relation Name Age of Onset    Other (Cardiac disorder) Other Sibling         Allergies  Ace inhibitors, Eptifibatide, Ezetimibe, Ezetimibe-simvastatin, Metoprolol, Pravastatin, and Rosuvastatin    Review of Systems  12 point ROS reviewed, negative except for as noted above    Last Recorded Vitals  /89   Pulse 69   Temp 36.1 °C (97 °F)   Resp 16   Wt 86.6 kg (191 lb)   SpO2 99%       Physical Exam  Constitutional: Well developed, well nourished, in no acute cardiopulmonary distress. Laying back in bed comfortably and talkative, drowsy but will answer questions appropriately    Eyes: PERRL, EOMI    Head/Neck: Normocephalic, atraumatic. Neck supple, no thyromegaly, JVD. Trachea midline    Respiratory/Thorax: Normal respiratory effort.  Lungs CTAB with no wheezing, rales, or rhonchi noted    Cardiovascular: RRR, no murmurs, rubs, or gallops noted. Peripheral pulses 2+    Gastrointestinal: Bowel sounds normal. Abdomen soft, nontender, nondistended, with no palpable masses.  Colostomy in place    Musculoskeletal: No gross deformities. No gross muscular weakness with no ROM limitation    Extremities: No lower extremity edema noted bilaterally.  2-3+ bilateral lower extremity edema    Neurological: Alert and oriented x3, CN II - VII grossly intact    Psychological: Appropriate mood and affect    Skin: No rashes or lesions noted.         Relevant Results  Results for orders placed or performed during the hospital encounter of 10/26/23 (from the past 24 hour(s))   Prepare RBC: 1 Units   Result Value Ref Range    PRODUCT CODE M7274P07     Unit Number [id number]     Unit ABO O     Unit RH POS     XM INTEP COMP     Dispense Status IS     Blood Expiration Date November 21, 2023 23:59 EST     PRODUCT BLOOD TYPE 5100     UNIT VOLUME 350    Type And Screen   Result Value Ref Range    ABO TYPE O     Rh TYPE POS     ANTIBODY SCREEN NEG    Protime-INR   Result Value Ref Range    Protime 13.1 (H) 9.8 - 12.8 seconds    INR 1.2 (H) 0.9 - 1.1   aPTT   Result Value Ref Range    aPTT 32 27 - 38 seconds      No results found.   Scheduled medications:    Continuous medications:    PRN medications:       Assessment and Plan  Acute renal failure superimposed on chronic kidney disease (CMS/HCC)  Assessment & Plan  Presumed related to anemia.  Monitor kidney function closely, if not improving can consider nephrology consult    * Gastrointestinal hemorrhage, unspecified gastrointestinal hemorrhage type  Assessment & Plan  Acute on chronic anemia with recent reported bloody stools.  Transfusion ordered in ED.  We will trend H&H, transfuse as needed.  GI consult, appreciate further recs.  Continue to monitor    Atherosclerotic heart disease of native coronary artery without  angina pectoris  Assessment & Plan  S/p prior CABG and PCI.  Continue home medications    Atrial flutter (CMS/[facility])  Assessment & Plan  Not on AC due to history of bleeding.  Continue home rate controlling medications    Chronic diastolic congestive heart failure (CMS/[facility])  Assessment & Plan  Increased bilateral lower extremity edema.  Will give additional Lasix with transfusion and monitor fluid status closely    COPD (chronic obstructive pulmonary disease) (CMS/[facility])  Assessment & Plan  No baseline oxygen requirement.  Continue medications       DVT ppx    SCDs, No AC with GI bleed    Agustin Stern PA-C

## 2023-10-26 NOTE — ED PROVIDER NOTES
HPI   Chief Complaint   Patient presents with    abnormal labs, low hemoglobin, fatigue, weakness     Pt falling asleep during triage       HPI       83-year-old male with a history of colon cancer status post colectomy, HFrEF, CKD, paroxysmal A-fib, and GI bleed with chronic blood loss anemia requiring multiple transfusions presenting to the ED with suspicion for GI bleeding.  Patient reports he has had a small amount of dark red blood in his colostomy bag over the past 24 hours.  He does report some lightheadedness as well as shortness of breath with exertion.  No significant abdominal pain or chest pain.  Has not syncopized.  He does report still taking Eliquis but this was apparently discontinued during his last hospitalization.          No data recorded                Patient History   Past Medical History:   Diagnosis Date    Atherosclerosis of coronary artery bypass graft(s) without angina pectoris 07/15/2016    Atherosclerosis of coronary artery bypass graft(s) without angina pectoris    Atherosclerosis of coronary artery bypass graft(s), unspecified, with other forms of angina pectoris (CMS/HCC) 04/22/2019    Coronary artery disease of bypass graft of native heart with stable angina pectoris    Benign prostatic hyperplasia without lower urinary tract symptoms     BPH (benign prostatic hyperplasia)    Other chest pain 04/01/2016    Chest discomfort    Other chest pain 09/17/2019    Atypical chest pain    Other chest pain 05/11/2018    Atypical chest pain    Personal history of other diseases of the circulatory system 04/01/2016    History of angina pectoris    Personal history of other diseases of the digestive system     History of ulcerative colitis    Personal history of other diseases of the musculoskeletal system and connective tissue     History of osteoarthritis    Personal history of other diseases of the musculoskeletal system and connective tissue 12/07/2018    History of chronic back pain     Personal history of other diseases of urinary system 06/21/2019    History of hematuria    Personal history of other specified conditions 04/01/2016    History of chest pain    Personal history of other specified conditions 07/15/2016    History of precordial chest pain     Past Surgical History:   Procedure Laterality Date    ADENOIDECTOMY  04/01/2016    Adenoidectomy    CATARACT EXTRACTION  04/01/2016    Cataract Surgery    CORONARY ARTERY BYPASS GRAFT  04/01/2016    CABG    CT GUIDED PERCUTANEOUS PERITONEAL OR RETROPERITONEAL FLUID COLLECTION DRAINAGE  10/8/2018    CT GUIDED PERCUTANEOUS PERITONEAL OR RETROPERITONEAL FLUID COLLECTION DRAINAGE 10/8/2018 [facility]    ILEOSTOMY  11/19/2022    Ileostomy    OTHER SURGICAL HISTORY  04/01/2016    Cath Atherectomy 3 Left Internal Mammary Graft    OTHER SURGICAL HISTORY  12/09/2021    Pacemaker insertion    OTHER SURGICAL HISTORY  12/09/2021    Cardioversion    OTHER SURGICAL HISTORY  10/23/2018    Total Abdominal Colectomy    OTHER SURGICAL HISTORY  10/22/2018    Complete Proctectomy Combined APR W/ Colostomy Laparoscopic    ROTATOR CUFF REPAIR  04/01/2016    Rotator Cuff Repair    TONSILLECTOMY  04/01/2016    Tonsillectomy    TOTAL KNEE ARTHROPLASTY  04/01/2016    Knee Replacement    US GUIDED ABSCESS DRAIN  10/1/2018    US GUIDED ABSCESS DRAIN 10/1/2018 [facility] CLINICAL LEGACY    US GUIDED PERCUTANEOUS PERITONEAL OR RETROPERITONEAL FLUID COLLECTION DRAINAGE  10/1/2018    US GUIDED PERCUTANEOUS PERITONEAL OR RETROPERITONEAL FLUID COLLECTION DRAINAGE 10/1/2018 [facility] CLINICAL LEGAC[facility]     Family History   Problem Relation Name Age of Onset    Other (Cardiac disorder) Other Sibling      Social History     Tobacco Use    Smoking status: Former     Types: Cigarettes    Smokeless tobacco: Never   Vaping Use    Vaping Use: Never used   Substance Use Topics    Alcohol use: Not Currently    Drug use: Never       Physical Exam   ED Triage Vitals [10/26/23 1108]   Temp Heart  Rate Resp BP   36.7 °C (98.1 °F) 70 18 98/57      SpO2 Temp Source Heart Rate Source Patient Position   98 % Temporal Monitor Sitting      BP Location FiO2 (%)     Left arm --       Physical Exam  Physical Exam:  Triage vitals reviewed.  Constitutional: Well developed adult in no acute distress, non toxic in appearance  Head: Normocephalic, atraumatic  Skin: Intact, dry. No rashes or lesions.  Eyes: Pupils are equal.  Pale conjunctiva.  Neck: Supple. Trachea is midline.  Pulmonary: Normal work of breathing with no accessory muscle use noted.  Clear to auscultation bilaterally.   Cardiovascular: RRR. 2+ radial pulses bilaterally.   Abdomen: Soft, nondistended. Nontender to palpation.  Colostomy bag with small amount of dark red/brown liquid.  Musculoskeletal: No gross deformities.  Moving all extremities spontaneously without difficulty.  Neurological: Awake and alert. Face is symmetric.  Speech is clear. No obvious focal findings.  Psychiatric: Appropriate mood and affect.    ED Course & MDM   ED Course as of 11/26/23 2300   u Oct 26, 2023   1222 Consent for blood transfusion signed. [DR]      ED Course User Index  [DR] Radha Diez, DO         Diagnoses as of 11/26/23 2300   Gastrointestinal hemorrhage, unspecified gastrointestinal hemorrhage type          Medical Decision Making  83-year-old male with a history of colon cancer status post colectomy, HFrEF, CKD, paroxysmal A-fib, and GI bleed with chronic blood loss anemia requiring multiple transfusions presenting to the ED with suspicion for GI bleeding.  On exam patient appears pale but is otherwise in no acute distress.  He is not tachycardic or hypotensive.  He does report feeling very fatigued and was falling asleep in triage.  No abdominal tenderness.  Does have dark brown-red material in colostomy bag.  Patient was typed and crossed given he already had outpatient labs showing hemoglobin of 6.7 this morning.  Will be transfused 1 unit PRBCs.  Consent  signed.  Given his age and his Eliquis use he is high risk for ongoing bleeding.  Will be admitted for further management.    Procedure  Procedures     Radha Diez,   11/26/23 6016

## 2023-10-26 NOTE — ASSESSMENT & PLAN NOTE
Presumed related to anemia  Has seemed to been progressed to possibly new normal of 2.0 lately  Nephrology consult appreciated  Continue lasix for now as he is getting blood products but may need to hold  Avoid other nephrotoxins  Monitor kidney function closely

## 2023-10-26 NOTE — ASSESSMENT & PLAN NOTE
Acute on chronic anemia with recent reported bloody stools  S/p 2u PRBC thus far  GI consult, appreciate further recs- Planning EGD today  Stool in ostomy noted to be formed and brown

## 2023-10-26 NOTE — ASSESSMENT & PLAN NOTE
Increased bilateral lower extremity edema.  Will give additional Lasix with transfusion and monitor fluid status closely

## 2023-10-27 PROBLEM — D62 ACUTE ON CHRONIC BLOOD LOSS ANEMIA: Status: ACTIVE | Noted: 2023-01-01

## 2023-10-27 NOTE — ANESTHESIA PREPROCEDURE EVALUATION
Patient: Michael Duran    Procedure Information       Date/Time: 10/27/23 1200    Scheduled providers: Prasanna Menjivar MD    Procedure: EGD    Location: [facility]            Relevant Problems   Cardiovascular   (+) Atherosclerotic heart disease of native coronary artery without angina pectoris   (+) Atrial flutter (CMS/HCC)   (+) Chronic diastolic congestive heart failure (CMS/HCC)   (+) Chronic systolic heart failure (CMS/HCC)   (+) Essential hypertension   (+) Hyperlipidemia   (+) Paroxysmal atrial fibrillation (CMS/HCC)   (+) Peripheral vascular disease (CMS/HCC)   (+) Presence of permanent cardiac pacemaker   (+) Ventricular tachycardia (CMS/HCC)   (+) Ventricular trigeminy      GI   (+) Chronic diarrhea   (+) GI bleeding   (+) Gastrointestinal hemorrhage, unspecified gastrointestinal hemorrhage type   (+) Primary colon cancer (CMS/HCC)   (+) Rectal cancer (CMS/HCC)   (+) Ulcerative pancolitis with complication (CMS/HCC)      /Renal   (+) Acute renal failure superimposed on chronic kidney disease (CMS/HCC)   (+) Hypertensive kidney disease with stage 3b chronic kidney disease (CMS/HCC)   (+) Renal cysts, acquired, bilateral   (+) Stage 3 chronic kidney disease (CMS/HCC)      Neuro/Psych   (+) Lumbar radiculitis   (+) Sciatica      Pulmonary   (+) COPD (chronic obstructive pulmonary disease) (CMS/HCC)   (+) Obstructive sleep apnea   (+) Pulmonary nodules/lesions, multiple      GI/Hepatic   (+) Primary colon cancer (CMS/HCC)   (+) Rectal cancer (CMS/HCC)      Hematology   (+) Iron deficiency anemia   (+) Pancytopenia (CMS/HCC)      Eyes, Ears, Nose, and Throat   (+) Sensorineural hearing loss, bilateral      Infectious Disease   (+) Onychomycosis      Other   (+) Gout of right foot       Clinical information reviewed:   Tobacco  Allergies  Meds   Med Hx  Surg Hx   Fam Hx  Soc Hx        NPO Detail:  NPO/Void Status  Date of Last Liquid: 10/26/23  Time of Last Liquid: 0700          Physical Exam    Airway  Mallampati: II  TM distance: >3 FB  Neck ROM: full     Cardiovascular    Dental   (+) upper dentures, lower dentures     Pulmonary    Abdominal            Anesthesia Plan    ASA 3     MAC     The patient is not a current smoker.    intravenous induction   Anesthetic plan and risks discussed with patient.  Use of blood products discussed with patient who consented to blood products.    Plan discussed with CRNA.

## 2023-10-27 NOTE — CONSULTS
"Inpatient consult to Gastroenterology  Consult performed by: Prasanna Menjivar MD  Consult ordered by: Agustin Stern PA-C      Reason For Consult  \"Acute on chronic anemia with recent bloody stools\"      [facility] Gastroenterology Consultation Note    ASSESSMENT and PLAN:       Michael Duran is a 83 y.o. male with a significant past medical history of CKD, colon cancer s/p total colectomy, anemia, CAD, CHF, A-fib, COPD, and HTN who presented with blood in his ostomy. GI was consulted for \"Acute on chronic anemia with recent bloody stools\".       Upper GI bleeding  Presentation and history consistent with an upper GI source of bleeding. He has had multiple endoscopic interventions for bleeding as detailed below. Unfortunately with all of these interventions his pattern of bleeding has not significantly changed. In the past his advanced gastroenterologist (Dr. Jerez) had recommended EUS. Will plan for EGD today for further evaluation, but given his refractory bleeding it may be reasonable to follow up with Dr. Jerez to consider EUS and/or EGD with doppler probe to clarify the etiology of bleeding as these lesions have intermittently been described as varices, GAVE, gastritis, \"bleeding spots\", and Dieulafoy lesions.  - EGD today  - monitor H&H and transfuse as needed  - agree with PPI  - keep NPO        Prasanna Menjivar MD        Gastroenterology    [facility]    Clinical   [facility]        HISTORY OF PRESENT ILLNESS:     History Of Present Illness:    Michael Duran is a 83 y.o. male with a significant past medical history of CKD, colon cancer s/p total colectomy, anemia, CAD, CHF, A-fib, COPD, and HTN who presented with blood in his ostomy. GI was consulted for \"Acute on chronic anemia with recent bloody stools\".      Today he reports that he has continued to have blood in his ostomy. He says that this has " been persistent for the last year. He will typically see bright red or dark red blood or melena in his ostomy. He has not really noticed any changes in the pattern of bleeding with any of the previous interventions that have been done. Recently he has continued to see blood in his ostomy. He says that it will happen for a few days and then go away. No hematemesis. No abdominal pain. His sister says that Eliquis was stopped about a week ago.    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), diverticulosis  1/19/2023 - Ileoscopy: ileostomy stump friability, normal neoterminal ileum  3/10/2023 - EGD: large esophageal varices (banded), congestion/erythema in gastric body, gastric varices (GOV2), normal duodenum  3/308/2023 - EGD: post-banding scar in esophagus, no esophageal varices, two spots with bleeding in greater curvature (two hemostatic clips placed), gastric varices (GOV2), normal duodenum  4/14/2023 - EGD: normal esophagus, Dieulafoy lesion with bleeding in gastric antrum (clip placed), mild PHG, normal duodenum  9/1/2023 - EGD: normal esophagus, Dieulafoy lesion in gastric body (injected with epinephrine and clip placed), 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    I performed a complete 10 point review of systems and it is negative except as noted in HPI or above.    All other systems have been reviewed and are negative.        PAST HISTORIES:       Past  Medical History:  He has a past medical history of Atherosclerosis of coronary artery bypass graft(s) without angina pectoris (07/15/2016), Atherosclerosis of coronary artery bypass graft(s), unspecified, with other forms of angina pectoris (CMS/[facility]) (04/22/2019), Benign prostatic hyperplasia without lower urinary tract symptoms, Other chest pain (04/01/2016), Other chest pain (09/17/2019), Other chest pain (05/11/2018), Personal history of other diseases of the circulatory system (04/01/2016), Personal history of other diseases of the digestive system, Personal history of other diseases of the musculoskeletal system and connective tissue, Personal history of other diseases of the musculoskeletal system and connective tissue (12/07/2018), Personal history of other diseases of urinary system (06/21/2019), Personal history of other specified conditions (04/01/2016), and Personal history of other specified conditions (07/15/2016).    Past Surgical History:  He has a past surgical history that includes Rotator cuff repair (04/01/2016); Other surgical history (04/01/2016); Coronary artery bypass graft (04/01/2016); Cataract extraction (04/01/2016); Total knee arthroplasty (04/01/2016); Tonsillectomy (04/01/2016); Adenoidectomy (04/01/2016); Other surgical history (12/09/2021); Other surgical history (12/09/2021); Ileostomy (11/19/2022); Other surgical history (10/23/2018); Other surgical history (10/22/2018); CT guided percutaneous peritoneal or retroperitoneal fluid collection drainage (10/8/2018); US guided percutaneous peritoneal or retroperitoneal fluid collection drainage (10/1/2018); and US guided abscess drain (10/1/2018).      Social History:  He reports that he has quit smoking. His smoking use included cigarettes. He has never used smokeless tobacco. He reports that he does not currently use alcohol. He reports that he does not use drugs.    Family History:  No known GI disease, specifically denies  "pancreatitis, Crohn's, colon cancer, gastroesophageal cancer, or ulcerative colitis.    Family History   Problem Relation Name Age of Onset    Other (Cardiac disorder) Other Sibling         Allergies:  Ace inhibitors, Eptifibatide, Ezetimibe, Ezetimibe-simvastatin, Metoprolol, Pravastatin, and Rosuvastatin      OBJECTIVE:       Last Recorded Vitals:  Vitals:    10/26/23 2018 10/27/23 0106 10/27/23 0113 10/27/23 0602   BP: 123/66  99/57 94/53   BP Location: Left arm   Left arm   Patient Position: Lying   Lying   Pulse: 67 76  73   Resp: 18 18  16   Temp: 36 °C (96.8 °F) 36.4 °C (97.6 °F)  36.6 °C (97.8 °F)   TempSrc: Temporal Temporal  Temporal   SpO2: 97% 95%  93%   Weight:       Height:         BP 94/53 (BP Location: Left arm, Patient Position: Lying)   Pulse 73   Temp 36.6 °C (97.8 °F) (Temporal)   Resp 16   Ht 1.727 m (5' 8\")   Wt 86.6 kg (191 lb)   SpO2 93%   BMI 29.04 kg/m²      Physical Exam:    Physical Exam  Vitals reviewed.   Constitutional:       General: He is not in acute distress.     Appearance: He is not ill-appearing.   HENT:      Head: Normocephalic and atraumatic.   Eyes:      General: No scleral icterus.  Cardiovascular:      Rate and Rhythm: Normal rate and regular rhythm.      Pulses: Normal pulses.      Heart sounds: Normal heart sounds. No murmur heard.  Pulmonary:      Effort: Pulmonary effort is normal. No respiratory distress.      Breath sounds: Normal breath sounds. No wheezing.   Abdominal:      General: Bowel sounds are normal.      Palpations: Abdomen is soft.      Tenderness: There is no abdominal tenderness. There is no rebound.      Comments: Ileostomy with brown stool in ostomy bag and healthy appearing mucosa.   Musculoskeletal:         General: No swelling or deformity.   Skin:     General: Skin is warm and dry.      Coloration: Skin is not jaundiced.      Findings: No rash.   Neurological:      General: No focal deficit present.      Mental Status: He is alert and " oriented to person, place, and time.   Psychiatric:         Mood and Affect: Mood normal.         Behavior: Behavior normal.         Thought Content: Thought content normal.         Judgment: Judgment normal.         Inpatient Medications:  amiodarone, 200 mg, oral, Daily  eplerenone, 25 mg, oral, Daily  furosemide, 80 mg, oral, Daily  levothyroxine, 25 mcg, oral, Daily  pantoprazole, 40 mg, oral, BID AC  polyethylene glycol, 17 g, oral, Daily  potassium chloride, 20 mEq, intravenous, q2h  rosuvastatin, 5 mg, oral, Daily  tafamidis, 61 mg, oral, Daily  tamsulosin, 0.4 mg, oral, Daily  traMADol, 50 mg, oral, BID      PRN medications: acetaminophen **OR** acetaminophen **OR** acetaminophen, acetaminophen **OR** acetaminophen **OR** acetaminophen, melatonin    Outpatient Medications:  Prior to Admission medications    Medication Sig Start Date End Date Taking? Authorizing Provider   amiodarone (Pacerone) 200 mg tablet Take 1 tablet (200 mg) by mouth once daily.    Historical Provider, MD   aspirin 81 mg chewable tablet Chew 1 tablet (81 mg) once daily. Do not start before October 12, 2023. 10/12/23 11/11/23  SOFÍA Hyde-CNP   diphenoxylate-atropine (Lomotil) 2.5-0.025 mg tablet Take 2 tablets by mouth 4 times a day as needed for diarrhea.    Historical Provider, MD   empagliflozin (Jardiance) 25 mg Take 1 tablet (25 mg) by mouth once daily. 8/30/22   Historical Provider, MD   eplerenone (Inspra) 25 mg tablet Take 1 tablet (25 mg) by mouth once daily.    Historical Provider, MD   furosemide (Lasix) 80 mg tablet Take 1 tablet (80 mg) by mouth once daily. 10/11/23   SOFÍA Hyde-CNP   levothyroxine (Synthroid, Levoxyl) 25 mcg tablet Take 1 tablet (25 mcg) by mouth once daily.    Historical Provider, MD   pantoprazole (ProtoNix) 40 mg EC tablet Take 1 tablet (40 mg) by mouth once daily. Do not crush, chew, or split. 10/11/23 11/10/23  LING Hyde   potassium chloride CR 10 mEq ER  "tablet 1 tablet (10 mEq) once daily. 5/1/23   Historical Provider, MD   rosuvastatin (Crestor) 5 mg tablet Take 1 tablet (5 mg) by mouth once daily. 10/19/23 10/18/24  eBv Alston MD   tafamidis (Vyndamax) 61 mg capsule Take 1 capsule (61 mg) by mouth once daily. 9/8/22   Historical Provider, MD   tamsulosin (Flomax) 0.4 mg 24 hr capsule Take 1 capsule (0.4 mg) by mouth once daily.    Historical Provider, MD   traMADol (Ultram) 50 mg tablet Take 1 tablet (50 mg) by mouth once daily as needed.    Historical Provider, MD   acetaminophen (Tylenol) 325 mg tablet Take by mouth every 6 hours if needed. 10/26/22 10/26/23  Historical Provider, MD   ALPRAZolam (Xanax) 0.25 mg tablet Take by mouth every 8 hours if needed.  10/26/23  Historical Provider, MD   fluticasone (Flonase) 50 mcg/actuation nasal spray Administer 1 spray into each nostril once daily.  10/23/23  Historical Provider, MD   naloxone (Narcan) 4 mg/0.1 mL nasal spray Administer into affected nostril(s). 9/8/21 10/26/23  Historical Provider, MD   zolpidem (Ambien) 5 mg tablet TAKE 1 TABLET BY MOUTH EVERY DAY AT BEDTIME FOR 30 DAYS  10/26/23  Historical Provider, MD       LABS AND IMAGING:     Labs:  Recent labs reviewed in the EMR.  Lab Results   Component Value Date    WBC 3.1 (L) 10/27/2023    HGB 6.6 (L) 10/27/2023    HGB 6.6 (L) 10/27/2023    HGB 7.5 (L) 10/26/2023    MCV 94 10/27/2023     (L) 10/27/2023       No results found for: \"FERRITIN\", \"IRON\"    Lab Results   Component Value Date     (L) 10/27/2023    K 3.4 (L) 10/27/2023    CL 99 10/27/2023    BUN 65 (H) 10/27/2023    CREATININE 2.58 (H) 10/27/2023       Lab Results   Component Value Date    BILITOT 0.7 10/27/2023     Lab Results   Component Value Date    ALT 30 10/27/2023    AST 69 (H) 10/27/2023    ALKPHOS 93 10/27/2023       Lab Results   Component Value Date    LIPASE 16 09/13/2023       No results found for: \"CRP\"      Imaging:  ECG 12 lead (Clinic Performed)    Result " Date: 10/19/2023  Atrial sensed ventricular paced rhythm.    XR chest 2 views    Result Date: 10/5/2023  STUDY: Chest Radiographs;  10/05/2023, 10:00AM INDICATION: Shortness of breath. COMPARISON: 05/31/2023, 06/14/2022 XR chest ACCESSION NUMBER(S): [id number] ORDERING CLINICIAN: RAMO ECHEVERRIA TECHNIQUE:  Frontal and lateral chest. FINDINGS: CARDIOMEDIASTINAL SILHOUETTE: The patient status post median sternotomy. There is a dual lead pacemaker present. Cardiomediastinal silhouette is enlarged.  LUNGS: There are bibasilar infiltrates. These are linear and may represent subsegmental atelectasis but infection cannot be excluded.  ABDOMEN: No remarkable upper abdominal findings.  BONES: No acute osseous changes.    Bibasilar pulmonary infiltrates most likely representing atelectasis. Signed by Teo Nunez MD    CT head wo IV contrast    Result Date: 10/5/2023  Interpreted By:  Geovany Sebastian, STUDY: CT HEAD WO IV CONTRAST;  10/5/2023 9:41 am   INDICATION: Signs/Symptoms:HS.   COMPARISON: 02/21/2022   ACCESSION NUMBER(S): [id number]   ORDERING CLINICIAN: RAMO ECHEVERRIA   TECHNIQUE: Noncontrast axial CT scan of head was performed. Angled reformats in brain and bone windows were generated. The images were reviewed in bone, brain, blood and soft tissue windows.   FINDINGS: No acute edema. No acute hemorrhage. No mass effect. Ventricular system is normal for age. No extra-axial fluid collections. Orbits are normal. Paranasal sinuses are clear.   Dense calcifications of the vertebral arteries consistent with atherosclerosis.       No acute findings.   Signed by: Geovany Sebastian 10/5/2023 9:57 AM Dictation workstation:   [id number]    Point of Care Ultrasound    Result Date: 10/5/2023  Ramo Echeverria MD     10/5/2023  3:42 PM Performed by: Ramo Echeverria MD Authorized by: Ramo Echeverria MD  Procedure: Cardiac Ultrasound Findings:  Views: parasternal long, parasternal short, apical four and  subxiphoid Effusion: The pericardial space was visualized and was positive for a PERICARDIAL EFFUSION. Activity: Ventricular contractions were visualized. LV: LV systolic function was DECREASED. RV: RV size was NORMAL. Impression: Cardiac: The focused cardiac ultrasound exam had ABNORMAL findings as specified. Comments: Pericardial effusion but no signs of tamponade.  IVC is plump.

## 2023-10-27 NOTE — ANESTHESIA POSTPROCEDURE EVALUATION
Patient: Michael Duran    Procedure Summary       Date: 10/27/23 Room / Location: [facility] OR    Anesthesia Start: 1205 Anesthesia Stop: 1230    Procedure: EGD Diagnosis: Anemia, unspecified type    Scheduled Providers: Prasanna Menjivar MD Responsible Provider: DALLIN Ramos    Anesthesia Type: MAC ASA Status: 3            Anesthesia Type: MAC    Vitals Value Taken Time   /66 10/27/23 1311   Temp 36.7 °C (98 °F) 10/27/23 1311   Pulse 69 10/27/23 1315   Resp 11 10/27/23 1315   SpO2 97 % 10/27/23 1315   Vitals shown include unvalidated device data.    Anesthesia Post Evaluation    Patient location during evaluation: PACU  Patient participation: complete - patient participated  Level of consciousness: awake and alert  Pain score: 0  Pain management: adequate  Airway patency: patent  Cardiovascular status: acceptable  Respiratory status: acceptable  Hydration status: acceptable        No notable events documented.

## 2023-10-27 NOTE — CONSULTS
..[facility]  Nephrology Consult Note           Reason for Consult:  BRUNO  Requesting Physician:  Marina Alvarez MD    Chief Complaint:    Chief Complaint   Patient presents with    abnormal labs, low hemoglobin, fatigue, weakness     Pt falling asleep during triage       History of Present Ilness:   Patient is a 83 y.o. 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 Patient states has been having bloody stool from colostomy for months now. Reports difficulty with urination 2/2 edematous scrotum and shaft of penis. States his edema is worse than normal. Just received blood transfusion and returning from EGD this morning. Denies shortness of breath, chest pain, N/V/D. Denies fever, chills, unintentional weight loss or night sweats.      Nephrology is consulted for evaluation and management of BRUNO.   Patient's current creatinine level is 2.58, previously 2.62 on admission and was 2.21 on outpatient labs 10/24/2023. Baseline creatinine appears to be ~ 1.2-1.5 and seems to have been consistently in the 2.0 range more recently. Potassium level is 3.4 and supplemented with 40 mEq oral once. No evidence of metabolic acidosis.     Patient is not on Ace inhibotrs or ARB per review of home medications. Is on Jardiance 25 mg, Eplerenone 25 mg, and Lasix 80 mg on home med list.  Patient denies using NSAIDS. No evidence of recent exposure to IV contrast. Has received IV lasix 40 mg once and currently getting home dose of oral lasix and Eplerenone. Relative hypotension noted. Blood pressure was as low as 98/57 on presentation and currently soft.     Prior imaging of kidneys on CT abd/pelvis 8/30/2023:  Kidneys are normal in size and location.  There is a 3 mm  calcification versus vascular calcification in the left kidney. No hydronephrosis on either side.  LIVER: There is a nodular contour suggesting a degree of cirrhosis  Normal attenuation.            Past Medical History:    Past Medical History:   Diagnosis Date    Atherosclerosis of coronary artery bypass graft(s) without angina pectoris 07/15/2016    Atherosclerosis of coronary artery bypass graft(s) without angina pectoris    Atherosclerosis of coronary artery bypass graft(s), unspecified, with other forms of angina pectoris (CMS/HCC) 04/22/2019    Coronary artery disease of bypass graft of native heart with stable angina pectoris    Benign prostatic hyperplasia without lower urinary tract symptoms     BPH (benign prostatic hyperplasia)    Other chest pain 04/01/2016    Chest discomfort    Other chest pain 09/17/2019    Atypical chest pain    Other chest pain 05/11/2018    Atypical chest pain    Personal history of other diseases of the circulatory system 04/01/2016    History of angina pectoris    Personal history of other diseases of the digestive system     History of ulcerative colitis    Personal history of other diseases of the musculoskeletal system and connective tissue     History of osteoarthritis    Personal history of other diseases of the musculoskeletal system and connective tissue 12/07/2018    History of chronic back pain    Personal history of other diseases of urinary system 06/21/2019    History of hematuria    Personal history of other specified conditions 04/01/2016    History of chest pain    Personal history of other specified conditions 07/15/2016    History of precordial chest pain       Past Surgical History:    Past Surgical History:   Procedure Laterality Date    ADENOIDECTOMY  04/01/2016    Adenoidectomy    CATARACT EXTRACTION  04/01/2016    Cataract Surgery    CORONARY ARTERY BYPASS GRAFT  04/01/2016    CABG    CT GUIDED PERCUTANEOUS PERITONEAL OR RETROPERITONEAL FLUID COLLECTION  DRAINAGE  10/8/2018    CT GUIDED PERCUTANEOUS PERITONEAL OR RETROPERITONEAL FLUID COLLECTION DRAINAGE 10/8/2018 [facility]ICAL LEGAC[facility]    ILEOSTOMY  11/19/2022    Ileostomy    OTHER SURGICAL HISTORY  04/01/2016    Cath Atherectomy 3 Left Internal Mammary Graft    OTHER SURGICAL HISTORY  12/09/2021    Pacemaker insertion    OTHER SURGICAL HISTORY  12/09/2021    Cardioversion    OTHER SURGICAL HISTORY  10/23/2018    Total Abdominal Colectomy    OTHER SURGICAL HISTORY  10/22/2018    Complete Proctectomy Combined APR W/ Colostomy Laparoscopic    ROTATOR CUFF REPAIR  04/01/2016    Rotator Cuff Repair    TONSILLECTOMY  04/01/2016    Tonsillectomy    TOTAL KNEE ARTHROPLASTY  04/01/2016    Knee Replacement    US GUIDED ABSCESS DRAIN  10/1/2018    US GUIDED ABSCESS DRAIN 10/1/2018 [facility] CLINICAL LEGACY    US GUIDED PERCUTANEOUS PERITONEAL OR RETROPERITONEAL FLUID COLLECTION DRAINAGE  10/1/2018    US GUIDED PERCUTANEOUS PERITONEAL OR RETROPERITONEAL FLUID COLLECTION DRAINAGE 10/1/2018 [facility] CLINICAL LEGACY       Home Medications:    No current facility-administered medications on file prior to encounter.     Current Outpatient Medications on File Prior to Encounter   Medication Sig Dispense Refill    amiodarone (Pacerone) 200 mg tablet Take 1 tablet (200 mg) by mouth once daily.      aspirin 81 mg chewable tablet Chew 1 tablet (81 mg) once daily. Do not start before October 12, 2023. 30 tablet 0    diphenoxylate-atropine (Lomotil) 2.5-0.025 mg tablet Take 2 tablets by mouth 4 times a day as needed for diarrhea.      empagliflozin (Jardiance) 25 mg Take 1 tablet (25 mg) by mouth once daily.      eplerenone (Inspra) 25 mg tablet Take 1 tablet (25 mg) by mouth once daily.      furosemide (Lasix) 80 mg tablet Take 1 tablet (80 mg) by mouth once daily. 30 tablet 0    levothyroxine (Synthroid, Levoxyl) 25 mcg tablet Take 1 tablet (25 mcg) by mouth once daily.      pantoprazole (ProtoNix) 40 mg EC tablet Take 1 tablet (40 mg) by mouth  once daily. Do not crush, chew, or split. 30 tablet 0    potassium chloride CR 10 mEq ER tablet 1 tablet (10 mEq) once daily.      rosuvastatin (Crestor) 5 mg tablet Take 1 tablet (5 mg) by mouth once daily. 30 tablet 11    tafamidis (Vyndamax) 61 mg capsule Take 1 capsule (61 mg) by mouth once daily.      tamsulosin (Flomax) 0.4 mg 24 hr capsule Take 1 capsule (0.4 mg) by mouth once daily.      traMADol (Ultram) 50 mg tablet Take 1 tablet (50 mg) by mouth once daily as needed.      [DISCONTINUED] acetaminophen (Tylenol) 325 mg tablet Take by mouth every 6 hours if needed.      [DISCONTINUED] ALPRAZolam (Xanax) 0.25 mg tablet Take by mouth every 8 hours if needed.      [DISCONTINUED] fluticasone (Flonase) 50 mcg/actuation nasal spray Administer 1 spray into each nostril once daily.      [DISCONTINUED] naloxone (Narcan) 4 mg/0.1 mL nasal spray Administer into affected nostril(s).      [DISCONTINUED] zolpidem (Ambien) 5 mg tablet TAKE 1 TABLET BY MOUTH EVERY DAY AT BEDTIME FOR 30 DAYS         Allergies:  Ace inhibitors, Eptifibatide, Ezetimibe, Ezetimibe-simvastatin, Metoprolol, Pravastatin, and Rosuvastatin    Social History:    Social History     Socioeconomic History    Marital status:      Spouse name: Not on file    Number of children: Not on file    Years of education: Not on file    Highest education level: Not on file   Occupational History    Not on file   Tobacco Use    Smoking status: Former     Types: Cigarettes    Smokeless tobacco: Never   Vaping Use    Vaping Use: Never used   Substance and Sexual Activity    Alcohol use: Not Currently    Drug use: Never    Sexual activity: Not on file   Other Topics Concern    Not on file   Social History Narrative    Not on file     Social Determinants of Health     Financial Resource Strain: Low Risk  (10/26/2023)    Overall Financial Resource Strain (CARDIA)     Difficulty of Paying Living Expenses: Not hard at all   Food Insecurity: No Food Insecurity  (10/23/2023)    Hunger Vital Sign     Worried About Running Out of Food in the Last Year: Never true     Ran Out of Food in the Last Year: Never true   Transportation Needs: No Transportation Needs (10/26/2023)    PRAPARE - Transportation     Lack of Transportation (Medical): No     Lack of Transportation (Non-Medical): No   Physical Activity: Not on file   Stress: Not on file   Social Connections: Not on file   Intimate Partner Violence: Not on file   Housing Stability: Low Risk  (10/26/2023)    Housing Stability Vital Sign     Unable to Pay for Housing in the Last Year: No     Number of Places Lived in the Last Year: 1     Unstable Housing in the Last Year: No       Family History:   Family History   Problem Relation Name Age of Onset    Other (Cardiac disorder) Other Sibling        Review of Systems:   Pertinent positives stated above in HPI. All other systems were reviewed and were negative.      Physical exam:   Constitutional:    Vitals:    10/27/23 0106 10/27/23 0113 10/27/23 0602 10/27/23 0921   BP:  99/57 94/53 117/61   BP Location:   Left arm Left arm   Patient Position:   Lying Sitting   Pulse: 76  73 72   Resp: 18  16 17   Temp: 36.4 °C (97.6 °F)  36.6 °C (97.8 °F) 35.9 °C (96.7 °F)   TempSrc: Temporal  Temporal Temporal   SpO2: 95%  93% 96%   Weight:       Height:           CURRENT TEMPERATURE:  Temp: 35.9 °C (96.7 °F)  MAXIMUM TEMPERATURE OVER 24HRS:  Temp (24hrs), Av.2 °C (97.2 °F), Min:35.8 °C (96.4 °F), Max:36.7 °C (98.1 °F)    CURRENT PULSE:  Heart Rate: 72  CURRENT BLOOD PRESSURE:  BP: 117/61  24HR BLOOD PRESSURE RANGE:  Systolic (24hrs), Av , Min:94 , Max:123   ; Diastolic (24hrs), Av, Min:53, Max:89    24HR INTAKE/OUTPUT:    Intake/Output Summary (Last 24 hours) at 10/27/2023 0954  Last data filed at 10/26/2023 1735  Gross per 24 hour   Intake 240 ml   Output --   Net 240 ml       Current Facility-Administered Medications:     acetaminophen (Tylenol) tablet 650 mg, 650 mg, oral, q4h  PRN **OR** acetaminophen (Tylenol) oral liquid 650 mg, 650 mg, nasogastric tube, q4h PRN **OR** acetaminophen (Tylenol) suppository 650 mg, 650 mg, rectal, q4h PRN, Agsutin Stern PA-C    acetaminophen (Tylenol) tablet 650 mg, 650 mg, oral, q4h PRN **OR** acetaminophen (Tylenol) oral liquid 650 mg, 650 mg, oral, q4h PRN **OR** acetaminophen (Tylenol) suppository 650 mg, 650 mg, rectal, q4h PRN, Agustin Stern PA-C    amiodarone (Pacerone) tablet 200 mg, 200 mg, oral, Daily, Agustin Stern PA-C, 200 mg at 10/27/23 0847    eplerenone (Inspra) tablet 25 mg, 25 mg, oral, Daily, Agustin Stern PA-C, 25 mg at 10/27/23 0847    furosemide (Lasix) tablet 80 mg, 80 mg, oral, Daily, PHILLIP Santillan-ALICIA, 80 mg at 10/27/23 0847    levothyroxine (Synthroid, Levoxyl) tablet 25 mcg, 25 mcg, oral, Daily, Agustin Stern PA-C    melatonin tablet 3 mg, 3 mg, oral, Nightly PRN, Agustin Stern PA-C    pantoprazole (ProtoNix) EC tablet 40 mg, 40 mg, oral, BID AC, Agustin Stern PA-C, 40 mg at 10/26/23 1717    polyethylene glycol (Glycolax, Miralax) packet 17 g, 17 g, oral, Daily, Agustin Stern PA-C    potassium chloride 20 mEq in 100 mL IV premix, 20 mEq, intravenous, q2h, Maureen Armendariz PA-C, Last Rate: 50 mL/hr at 10/27/23 0846, 20 mEq at 10/27/23 0846    rosuvastatin (Crestor) tablet 5 mg, 5 mg, oral, Daily, Agustin Stern PA-C, 5 mg at 10/27/23 0846    tafamidis (Vyndamax) capsule 61 mg, 61 mg, oral, Daily, Agustin Stern PA-C, 61 mg at 10/27/23 0846    tamsulosin (Flomax) 24 hr capsule 0.4 mg, 0.4 mg, oral, Daily, Agustin Stern PA-C, 0.4 mg at 10/27/23 0846    traMADol (Ultram) tablet 50 mg, 50 mg, oral, BID, Agustin Stern PA-C, 50 mg at 10/27/23 0847      Physical Exam:  Appearance: NAD, awake, alert, cooperative to exam  Eyes: PERRLA, clear sclera  ENT: hearing normal, no oral thrush, no erythema or exudate on hard or soft palate, tongue normal, moist mucus membranes  Neck: supple, no JVD, no  thyromegaly  Respiratory: breathing unlabored, LLL crackles. Right lung clear w/o c/r/w  Cardiovascular: heart RRR without m/g/r, pedal pulses palpable  GI: LUQ ostomy+, abdomen soft, NT, ND, normoactive bowel sounds. No masses or hernia.  : Edema + scrotum and shaft of penis  Musculoskeletal: 1-2 + pitting edema BLE up to thighs and scrotum  Skin: warm and dry, no rash or wounds  Neurologic: alert and oriented to person/place/time  Psychiatric: normal mood and range of affect    Data:    Lab Results   Component Value Date    WBC 3.1 (L) 10/27/2023    HGB 6.6 (L) 10/27/2023    HGB 6.6 (L) 10/27/2023    HCT 20.7 (L) 10/27/2023    HCT 20.7 (L) 10/27/2023    MCV 94 10/27/2023     (L) 10/27/2023     Lab Results   Component Value Date    GLUCOSE 88 10/27/2023    CALCIUM 7.5 (L) 10/27/2023     (L) 10/27/2023    K 3.4 (L) 10/27/2023    CO2 27 10/27/2023    CL 99 10/27/2023    BUN 65 (H) 10/27/2023    CREATININE 2.58 (H) 10/27/2023          Imaging: reviewed    Assessment:  BRUNO/ATN 2/2 hypotensive hypoperfusion injury in setting of GIB + diuretic +Eplerenone+sglt2i use  Hypokalemia, acute  Anemia, ABL 2/2 UGIB  Acute on chronic HFrEF 45%  Anasarca  CKD 3a  Hypotension  Hx of rectal CA s/p colectomy with colostomy in place  Cirrhosis of liver        Recommendation:  -Scr currently 2.58, slightly improved, bl~1.2-1.5, non oliguric, BP soft but stable  -Avoid further hypotension, nephrotoxins, and IV contrast  -No acute need for RRT at this time  -Volume overloaded clinically, no IVF at this time  -c/w oral Lasix 80 mg at home dose  -Hold YASMIN blockade: Eplerenone and Jardiance  -BRUNO workup ordered: UA and urine studies  -Bladder scan x1 for pvr, place rivera for >250cc  -Dose all meds per eGFR <20  -K replenished  -Consult urology for scrotal edema  -Transfuse for Hb <7  -s/p EGD today and further work up for GIB per GI   -We will follow closely with you    Thank you for the consult and the opportunity to  participate in the care of this patient. Please do not hesitate to call with any questions or concerns.    SOFÍA Aguillon-CNP  [facility], [facility]  [phone] office

## 2023-10-27 NOTE — SIGNIFICANT EVENT
Upper endoscopy (EGD) completed. Full report in EMR.      EGD revealed no esophageal varices and no gastric varices. 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 There was an area of active oozing related to GAVE in the gastric antrum near the pylorus. This was treated with APC. No bleeding at the end of the procedure.      Recommendations:  - continue twice daily PPI  - Sucralfate oral suspension (1 gram QID) for 14 days  - monitor H&H and transfuse as needed  - advance diet as tolerated  - repeat EGD with Dr. Herbert in 4-6 weeks for repeat treatment of GAVE, my office will arrange      Findings/recommendations discussed with patient and family after procedure.        Prasanna Menjivar MD    Gastroenterology    [facility]    Clinical   [facility]

## 2023-10-27 NOTE — H&P (VIEW-ONLY)
"Inpatient consult to Gastroenterology  Consult performed by: Prasanna Menjivar MD  Consult ordered by: Agustin Stern PA-C      Reason For Consult  \"Acute on chronic anemia with recent bloody stools\"      [facility] Gastroenterology Consultation Note    ASSESSMENT and PLAN:       Michael Duran is a 83 y.o. male with a significant past medical history of CKD, colon cancer s/p total colectomy, anemia, CAD, CHF, A-fib, COPD, and HTN who presented with blood in his ostomy. GI was consulted for \"Acute on chronic anemia with recent bloody stools\".       Upper GI bleeding  Presentation and history consistent with an upper GI source of bleeding. He has had multiple endoscopic interventions for bleeding as detailed below. Unfortunately with all of these interventions his pattern of bleeding has not significantly changed. In the past his advanced gastroenterologist (Dr. Jerez) had recommended EUS. Will plan for EGD today for further evaluation, but given his refractory bleeding it may be reasonable to follow up with Dr. Jerez to consider EUS and/or EGD with doppler probe to clarify the etiology of bleeding as these lesions have intermittently been described as varices, GAVE, gastritis, \"bleeding spots\", and Dieulafoy lesions.  - EGD today  - monitor H&H and transfuse as needed  - agree with PPI  - keep NPO        Prasanna Menjivar MD        Gastroenterology    [facility]    Clinical   [facility]        HISTORY OF PRESENT ILLNESS:     History Of Present Illness:    Michael Duran is a 83 y.o. male with a significant past medical history of CKD, colon cancer s/p total colectomy, anemia, CAD, CHF, A-fib, COPD, and HTN who presented with blood in his ostomy. GI was consulted for \"Acute on chronic anemia with recent bloody stools\".      Today he reports that he has continued to have blood in his ostomy. He says that this has " been persistent for the last year. He will typically see bright red or dark red blood or melena in his ostomy. He has not really noticed any changes in the pattern of bleeding with any of the previous interventions that have been done. Recently he has continued to see blood in his ostomy. He says that it will happen for a few days and then go away. No hematemesis. No abdominal pain. His sister says that Eliquis was stopped about a week ago.    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), diverticulosis  1/19/2023 - Ileoscopy: ileostomy stump friability, normal neoterminal ileum  3/10/2023 - EGD: large esophageal varices (banded), congestion/erythema in gastric body, gastric varices (GOV2), normal duodenum  3/308/2023 - EGD: post-banding scar in esophagus, no esophageal varices, two spots with bleeding in greater curvature (two hemostatic clips placed), gastric varices (GOV2), normal duodenum  4/14/2023 - EGD: normal esophagus, Dieulafoy lesion with bleeding in gastric antrum (clip placed), mild PHG, normal duodenum  9/1/2023 - EGD: normal esophagus, Dieulafoy lesion in gastric body (injected with epinephrine and clip placed), 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    I performed a complete 10 point review of systems and it is negative except as noted in HPI or above.    All other systems have been reviewed and are negative.        PAST HISTORIES:       Past  Medical History:  He has a past medical history of Atherosclerosis of coronary artery bypass graft(s) without angina pectoris (07/15/2016), Atherosclerosis of coronary artery bypass graft(s), unspecified, with other forms of angina pectoris (CMS/[facility]) (04/22/2019), Benign prostatic hyperplasia without lower urinary tract symptoms, Other chest pain (04/01/2016), Other chest pain (09/17/2019), Other chest pain (05/11/2018), Personal history of other diseases of the circulatory system (04/01/2016), Personal history of other diseases of the digestive system, Personal history of other diseases of the musculoskeletal system and connective tissue, Personal history of other diseases of the musculoskeletal system and connective tissue (12/07/2018), Personal history of other diseases of urinary system (06/21/2019), Personal history of other specified conditions (04/01/2016), and Personal history of other specified conditions (07/15/2016).    Past Surgical History:  He has a past surgical history that includes Rotator cuff repair (04/01/2016); Other surgical history (04/01/2016); Coronary artery bypass graft (04/01/2016); Cataract extraction (04/01/2016); Total knee arthroplasty (04/01/2016); Tonsillectomy (04/01/2016); Adenoidectomy (04/01/2016); Other surgical history (12/09/2021); Other surgical history (12/09/2021); Ileostomy (11/19/2022); Other surgical history (10/23/2018); Other surgical history (10/22/2018); CT guided percutaneous peritoneal or retroperitoneal fluid collection drainage (10/8/2018); US guided percutaneous peritoneal or retroperitoneal fluid collection drainage (10/1/2018); and US guided abscess drain (10/1/2018).      Social History:  He reports that he has quit smoking. His smoking use included cigarettes. He has never used smokeless tobacco. He reports that he does not currently use alcohol. He reports that he does not use drugs.    Family History:  No known GI disease, specifically denies  "pancreatitis, Crohn's, colon cancer, gastroesophageal cancer, or ulcerative colitis.    Family History   Problem Relation Name Age of Onset    Other (Cardiac disorder) Other Sibling         Allergies:  Ace inhibitors, Eptifibatide, Ezetimibe, Ezetimibe-simvastatin, Metoprolol, Pravastatin, and Rosuvastatin      OBJECTIVE:       Last Recorded Vitals:  Vitals:    10/26/23 2018 10/27/23 0106 10/27/23 0113 10/27/23 0602   BP: 123/66  99/57 94/53   BP Location: Left arm   Left arm   Patient Position: Lying   Lying   Pulse: 67 76  73   Resp: 18 18  16   Temp: 36 °C (96.8 °F) 36.4 °C (97.6 °F)  36.6 °C (97.8 °F)   TempSrc: Temporal Temporal  Temporal   SpO2: 97% 95%  93%   Weight:       Height:         BP 94/53 (BP Location: Left arm, Patient Position: Lying)   Pulse 73   Temp 36.6 °C (97.8 °F) (Temporal)   Resp 16   Ht 1.727 m (5' 8\")   Wt 86.6 kg (191 lb)   SpO2 93%   BMI 29.04 kg/m²      Physical Exam:    Physical Exam  Vitals reviewed.   Constitutional:       General: He is not in acute distress.     Appearance: He is not ill-appearing.   HENT:      Head: Normocephalic and atraumatic.   Eyes:      General: No scleral icterus.  Cardiovascular:      Rate and Rhythm: Normal rate and regular rhythm.      Pulses: Normal pulses.      Heart sounds: Normal heart sounds. No murmur heard.  Pulmonary:      Effort: Pulmonary effort is normal. No respiratory distress.      Breath sounds: Normal breath sounds. No wheezing.   Abdominal:      General: Bowel sounds are normal.      Palpations: Abdomen is soft.      Tenderness: There is no abdominal tenderness. There is no rebound.      Comments: Ileostomy with brown stool in ostomy bag and healthy appearing mucosa.   Musculoskeletal:         General: No swelling or deformity.   Skin:     General: Skin is warm and dry.      Coloration: Skin is not jaundiced.      Findings: No rash.   Neurological:      General: No focal deficit present.      Mental Status: He is alert and " oriented to person, place, and time.   Psychiatric:         Mood and Affect: Mood normal.         Behavior: Behavior normal.         Thought Content: Thought content normal.         Judgment: Judgment normal.         Inpatient Medications:  amiodarone, 200 mg, oral, Daily  eplerenone, 25 mg, oral, Daily  furosemide, 80 mg, oral, Daily  levothyroxine, 25 mcg, oral, Daily  pantoprazole, 40 mg, oral, BID AC  polyethylene glycol, 17 g, oral, Daily  potassium chloride, 20 mEq, intravenous, q2h  rosuvastatin, 5 mg, oral, Daily  tafamidis, 61 mg, oral, Daily  tamsulosin, 0.4 mg, oral, Daily  traMADol, 50 mg, oral, BID      PRN medications: acetaminophen **OR** acetaminophen **OR** acetaminophen, acetaminophen **OR** acetaminophen **OR** acetaminophen, melatonin    Outpatient Medications:  Prior to Admission medications    Medication Sig Start Date End Date Taking? Authorizing Provider   amiodarone (Pacerone) 200 mg tablet Take 1 tablet (200 mg) by mouth once daily.    Historical Provider, MD   aspirin 81 mg chewable tablet Chew 1 tablet (81 mg) once daily. Do not start before October 12, 2023. 10/12/23 11/11/23  SOFÍA Hyde-CNP   diphenoxylate-atropine (Lomotil) 2.5-0.025 mg tablet Take 2 tablets by mouth 4 times a day as needed for diarrhea.    Historical Provider, MD   empagliflozin (Jardiance) 25 mg Take 1 tablet (25 mg) by mouth once daily. 8/30/22   Historical Provider, MD   eplerenone (Inspra) 25 mg tablet Take 1 tablet (25 mg) by mouth once daily.    Historical Provider, MD   furosemide (Lasix) 80 mg tablet Take 1 tablet (80 mg) by mouth once daily. 10/11/23   SOFÍA Hyde-CNP   levothyroxine (Synthroid, Levoxyl) 25 mcg tablet Take 1 tablet (25 mcg) by mouth once daily.    Historical Provider, MD   pantoprazole (ProtoNix) 40 mg EC tablet Take 1 tablet (40 mg) by mouth once daily. Do not crush, chew, or split. 10/11/23 11/10/23  LING Hyde   potassium chloride CR 10 mEq ER  "tablet 1 tablet (10 mEq) once daily. 5/1/23   Historical Provider, MD   rosuvastatin (Crestor) 5 mg tablet Take 1 tablet (5 mg) by mouth once daily. 10/19/23 10/18/24  Bev Alston MD   tafamidis (Vyndamax) 61 mg capsule Take 1 capsule (61 mg) by mouth once daily. 9/8/22   Historical Provider, MD   tamsulosin (Flomax) 0.4 mg 24 hr capsule Take 1 capsule (0.4 mg) by mouth once daily.    Historical Provider, MD   traMADol (Ultram) 50 mg tablet Take 1 tablet (50 mg) by mouth once daily as needed.    Historical Provider, MD   acetaminophen (Tylenol) 325 mg tablet Take by mouth every 6 hours if needed. 10/26/22 10/26/23  Historical Provider, MD   ALPRAZolam (Xanax) 0.25 mg tablet Take by mouth every 8 hours if needed.  10/26/23  Historical Provider, MD   fluticasone (Flonase) 50 mcg/actuation nasal spray Administer 1 spray into each nostril once daily.  10/23/23  Historical Provider, MD   naloxone (Narcan) 4 mg/0.1 mL nasal spray Administer into affected nostril(s). 9/8/21 10/26/23  Historical Provider, MD   zolpidem (Ambien) 5 mg tablet TAKE 1 TABLET BY MOUTH EVERY DAY AT BEDTIME FOR 30 DAYS  10/26/23  Historical Provider, MD       LABS AND IMAGING:     Labs:  Recent labs reviewed in the EMR.  Lab Results   Component Value Date    WBC 3.1 (L) 10/27/2023    HGB 6.6 (L) 10/27/2023    HGB 6.6 (L) 10/27/2023    HGB 7.5 (L) 10/26/2023    MCV 94 10/27/2023     (L) 10/27/2023       No results found for: \"FERRITIN\", \"IRON\"    Lab Results   Component Value Date     (L) 10/27/2023    K 3.4 (L) 10/27/2023    CL 99 10/27/2023    BUN 65 (H) 10/27/2023    CREATININE 2.58 (H) 10/27/2023       Lab Results   Component Value Date    BILITOT 0.7 10/27/2023     Lab Results   Component Value Date    ALT 30 10/27/2023    AST 69 (H) 10/27/2023    ALKPHOS 93 10/27/2023       Lab Results   Component Value Date    LIPASE 16 09/13/2023       No results found for: \"CRP\"      Imaging:  ECG 12 lead (Clinic Performed)    Result " Date: 10/19/2023  Atrial sensed ventricular paced rhythm.    XR chest 2 views    Result Date: 10/5/2023  STUDY: Chest Radiographs;  10/05/2023, 10:00AM INDICATION: Shortness of breath. COMPARISON: 05/31/2023, 06/14/2022 XR chest ACCESSION NUMBER(S): [id number] ORDERING CLINICIAN: RAMO ECHEVERRIA TECHNIQUE:  Frontal and lateral chest. FINDINGS: CARDIOMEDIASTINAL SILHOUETTE: The patient status post median sternotomy. There is a dual lead pacemaker present. Cardiomediastinal silhouette is enlarged.  LUNGS: There are bibasilar infiltrates. These are linear and may represent subsegmental atelectasis but infection cannot be excluded.  ABDOMEN: No remarkable upper abdominal findings.  BONES: No acute osseous changes.    Bibasilar pulmonary infiltrates most likely representing atelectasis. Signed by Teo Nunez MD    CT head wo IV contrast    Result Date: 10/5/2023  Interpreted By:  Geovany Sebastian, STUDY: CT HEAD WO IV CONTRAST;  10/5/2023 9:41 am   INDICATION: Signs/Symptoms:HS.   COMPARISON: 02/21/2022   ACCESSION NUMBER(S): [id number]   ORDERING CLINICIAN: RAMO ECHEVERRIA   TECHNIQUE: Noncontrast axial CT scan of head was performed. Angled reformats in brain and bone windows were generated. The images were reviewed in bone, brain, blood and soft tissue windows.   FINDINGS: No acute edema. No acute hemorrhage. No mass effect. Ventricular system is normal for age. No extra-axial fluid collections. Orbits are normal. Paranasal sinuses are clear.   Dense calcifications of the vertebral arteries consistent with atherosclerosis.       No acute findings.   Signed by: Geovany Sebastian 10/5/2023 9:57 AM Dictation workstation:   [id number]    Point of Care Ultrasound    Result Date: 10/5/2023  Ramo Echeverria MD     10/5/2023  3:42 PM Performed by: Ramo Echeverria MD Authorized by: Ramo Echeverria MD  Procedure: Cardiac Ultrasound Findings:  Views: parasternal long, parasternal short, apical four and  subxiphoid Effusion: The pericardial space was visualized and was positive for a PERICARDIAL EFFUSION. Activity: Ventricular contractions were visualized. LV: LV systolic function was DECREASED. RV: RV size was NORMAL. Impression: Cardiac: The focused cardiac ultrasound exam had ABNORMAL findings as specified. Comments: Pericardial effusion but no signs of tamponade.  IVC is plump.

## 2023-10-28 NOTE — PROGRESS NOTES
"  [facility] Gastroenterology Progress Note    ASSESSMENT and PLAN:       Michael Duran is a 83 y.o. male with a significant past medical history of CKD, colon cancer s/p total colectomy, anemia, CAD, CHF, A-fib, COPD, and HTN who presented with blood in his ostomy. GI was consulted for \"Acute on chronic anemia with recent bloody stools\".        Upper GI bleeding  Presentation and history consistent with an upper GI source of bleeding. He has had multiple endoscopic interventions for bleeding as detailed below. Unfortunately with all of these interventions his pattern of bleeding has not significantly changed. In the past his advanced gastroenterologist (Dr. Jerez) had recommended EUS. 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 This was treated with APC and he is doing well today. He will need follow up EGD with Dr. Herbert for repeat treatment. Consider follow up EUS if pattern of bleeding/anemia doesn't change with repeat treatments.  - continue twice daily PPI  - Sucralfate oral suspension (1 gram QID) for 14 days  - monitor H&H and transfuse as needed  - advance diet as tolerated  - repeat EGD with Dr. Herbert in 4-6 weeks for repeat treatment of GAVE, my office will arrange            Prasanna Menjivar MD        Gastroenterology    [facility] [place] [facility]    Clinical   [facility]        SUBJECTIVE and INTERVAL HISTORY:       Michael Duran says that he is doing well today. He is sitting at the bedside and says that he does not have any complaints today. He denies any further bleeding including no hematemesis or blood in his ostomy. He says that he feels really well and wants to eat.        Review of systems:     Patient denies any heartburn/GERD, N/V, dysphagia, odynophagia, abdominal pain, diarrhea, constipation, hematemesis, hematochezia, melena, or weight loss.    I performed a complete 10 point review of systems " "and it is negative except as noted in HPI or above.    All other systems have been reviewed and are negative.          OBJECTIVE:       Last Recorded Vitals:  Vitals:    10/27/23 1715 10/28/23 0100 10/28/23 0500 10/28/23 0935   BP: 108/60 102/57 107/54 114/65   BP Location: Left arm Left arm Left arm Left arm   Patient Position: Lying Lying Lying Sitting   Pulse: 71 71 72 70   Resp: 16 16 16 18   Temp: 35.9 °C (96.6 °F) 36.4 °C (97.6 °F) 36.8 °C (98.2 °F) 36.9 °C (98.4 °F)   TempSrc: Temporal Temporal Temporal Temporal   SpO2: 96% 91% 94%    Weight:       Height:         /65 (BP Location: Left arm, Patient Position: Sitting)   Pulse 70   Temp 36.9 °C (98.4 °F) (Temporal)   Resp 18   Ht 1.727 m (5' 8\")   Wt 86.6 kg (191 lb)   SpO2 94%   BMI 29.04 kg/m²      Physical Exam:    Physical Exam  Vitals reviewed.   Constitutional:       General: He is not in acute distress.     Appearance: He is not ill-appearing.   HENT:      Head: Normocephalic and atraumatic.   Eyes:      General: No scleral icterus.  Cardiovascular:      Rate and Rhythm: Normal rate and regular rhythm.      Pulses: Normal pulses.      Heart sounds: Normal heart sounds. No murmur heard.  Pulmonary:      Effort: Pulmonary effort is normal. No respiratory distress.      Breath sounds: Normal breath sounds. No wheezing.   Abdominal:      General: Bowel sounds are normal.      Palpations: Abdomen is soft.      Tenderness: There is no abdominal tenderness. There is no rebound.      Comments: Ileostomy with light brown stool in ostomy bag and healthy appearing mucosa.   Musculoskeletal:         General: No swelling or deformity.   Skin:     General: Skin is warm and dry.      Coloration: Skin is not jaundiced.      Findings: No rash.   Neurological:      General: No focal deficit present.      Mental Status: He is alert and oriented to person, place, and time.   Psychiatric:         Mood and Affect: Mood normal.         Behavior: Behavior normal.  "        Thought Content: Thought content normal.         Judgment: Judgment normal.           Inpatient Medications:  amiodarone, 200 mg, oral, Daily  [Held by provider] eplerenone, 25 mg, oral, Daily  furosemide, 80 mg, oral, Daily  levothyroxine, 25 mcg, oral, Daily  pantoprazole, 40 mg, oral, BID AC  polyethylene glycol, 17 g, oral, Daily  potassium chloride CR, 40 mEq, oral, Once  rosuvastatin, 5 mg, oral, Daily  sucralfate, 1 g, oral, Before meals & nightly  tafamidis, 61 mg, oral, Daily  tamsulosin, 0.4 mg, oral, Daily  traMADol, 50 mg, oral, BID      PRN medications: acetaminophen **OR** acetaminophen **OR** acetaminophen, acetaminophen **OR** acetaminophen **OR** acetaminophen, melatonin    Outpatient Medications:  Prior to Admission medications    Medication Sig Start Date End Date Taking? Authorizing Provider   amiodarone (Pacerone) 200 mg tablet Take 1 tablet (200 mg) by mouth once daily.    Historical Provider, MD   aspirin 81 mg chewable tablet Chew 1 tablet (81 mg) once daily. Do not start before October 12, 2023. 10/12/23 11/11/23  SOFÍA Hyde-CNP   diphenoxylate-atropine (Lomotil) 2.5-0.025 mg tablet Take 2 tablets by mouth 4 times a day as needed for diarrhea.    Historical Provider, MD   empagliflozin (Jardiance) 25 mg Take 1 tablet (25 mg) by mouth once daily. 8/30/22   Historical Provider, MD   eplerenone (Inspra) 25 mg tablet Take 1 tablet (25 mg) by mouth once daily.    Historical Provider, MD   furosemide (Lasix) 80 mg tablet Take 1 tablet (80 mg) by mouth once daily. 10/11/23   LING Hyde   levothyroxine (Synthroid, Levoxyl) 25 mcg tablet Take 1 tablet (25 mcg) by mouth once daily.    Historical Provider, MD   pantoprazole (ProtoNix) 40 mg EC tablet Take 1 tablet (40 mg) by mouth once daily. Do not crush, chew, or split. 10/11/23 11/10/23  SOFÍA Hyde-CNP   potassium chloride CR 10 mEq ER tablet 1 tablet (10 mEq) once daily. 5/1/23   Historical  "Provider, MD   rosuvastatin (Crestor) 5 mg tablet Take 1 tablet (5 mg) by mouth once daily. 10/19/23 10/18/24  Bev Alston MD   tafamidis (Vyndamax) 61 mg capsule Take 1 capsule (61 mg) by mouth once daily. 9/8/22   Historical Provider, MD   tamsulosin (Flomax) 0.4 mg 24 hr capsule Take 1 capsule (0.4 mg) by mouth once daily.    Historical Provider, MD   traMADol (Ultram) 50 mg tablet Take 1 tablet (50 mg) by mouth once daily as needed.    Historical Provider, MD   acetaminophen (Tylenol) 325 mg tablet Take by mouth every 6 hours if needed. 10/26/22 10/26/23  Historical Provider, MD   ALPRAZolam (Xanax) 0.25 mg tablet Take by mouth every 8 hours if needed.  10/26/23  Historical Provider, MD   fluticasone (Flonase) 50 mcg/actuation nasal spray Administer 1 spray into each nostril once daily.  10/23/23  Historical Provider, MD   naloxone (Narcan) 4 mg/0.1 mL nasal spray Administer into affected nostril(s). 9/8/21 10/26/23  Historical Provider, MD   zolpidem (Ambien) 5 mg tablet TAKE 1 TABLET BY MOUTH EVERY DAY AT BEDTIME FOR 30 DAYS  10/26/23  Historical Provider, MD       LABS AND IMAGING:     Labs:  Recent labs reviewed in the EMR.  Lab Results   Component Value Date    WBC 3.2 (L) 10/28/2023    WBC 3.1 (L) 10/27/2023    HGB 8.4 (L) 10/28/2023    HGB 6.6 (L) 10/27/2023    HGB 6.6 (L) 10/27/2023    MCV 93 10/28/2023    MCV 94 10/27/2023     (L) 10/28/2023     (L) 10/27/2023       No results found for: \"FERRITIN\", \"IRON\"    Lab Results   Component Value Date     (L) 10/28/2023    K 3.0 (L) 10/28/2023    CL 98 10/28/2023    BUN 53 (H) 10/28/2023    CREATININE 2.15 (H) 10/28/2023       Lab Results   Component Value Date    BILITOT 0.7 10/27/2023     Lab Results   Component Value Date    ALT 30 10/27/2023    AST 69 (H) 10/27/2023    ALKPHOS 93 10/27/2023       Lab Results   Component Value Date    LIPASE 16 09/13/2023       No results found for: \"CRP\"      Imaging:  EGD    Result Date: " 10/27/2023  Table formatting from the original result was not included. Impression The esophagus appeared normal. 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 The duodenal bulb, 1st part of the duodenum and 2nd part of the duodenum appeared normal. Findings The esophagus appeared normal. No Esophageal varices. 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 No bleeding at the end of the procedure. The stomach appeared normal. The duodenal bulb, 1st part of the duodenum and 2nd part of the duodenum appeared normal. Recommendation  Follow up with primary gastroenterologist (Dr. Herbert) - The signs and symptoms of potential delayed complications were discussed with you and your family. If you have any concerns or develop any signs or symptoms of delayed complications you can call my office at [phone]. If you experience significant abdominal pain or bleeding you should proceed directly to the nearest Emergency Room or call 911. - Written post-procedure instructions were provided to the patient. - advance diet as tolerated - Continue twice daily PPI. - Use Sucralfate (1 gram oral suspension QID) for 14 days. Indication Anemia, unspecified type Michael Duran is a 83 y.o. male with a significant past medical history of CKD, colon cancer s/p total colectomy, anemia, CAD, CHF, A-fib, COPD, and HTN who presents for EGD to treat upper GI bleeding. He has had longstanding issues with upper GI bleeding and has undergone multiple upper endoscopies with intervention including injection/clip placement for Dieulafoy lesion, variceal banding, and APC treatment of GAVE. Post Procedure Diagnosis GAVE Staff Staff Role No Staff Documented  Medications See Anesthesia Record. Preprocedure A history and physical has been performed, and patient medication allergies have been reviewed. The patient's tolerance of previous anesthesia has been reviewed. The risks and benefits of the procedure and the sedation options and risks were discussed with the patient. All questions were answered and informed consent obtained. Details of the Procedure The patient underwent monitored anesthesia care, which was administered by an anesthesia professional. The patient's blood pressure, level of consciousness, oxygen, respirations, heart rate, ECG and ETCO2 were monitored throughout the procedure. The scope was introduced through the mouth and advanced to the second part of the duodenum. Retroflexion was performed in the cardia. The patient's estimated blood loss was minimal (<5 mL). The procedure was not difficult. The patient tolerated the procedure well. There were no apparent adverse events. Prior to the procedure, a History and Physical was performed, and patient medications and allergies were reviewed. Immediately prior to the administration of medications, the patient was re-assessed for adequacy to receive sedatives. The heart rate, respiratory rate, oxygen saturations, blood pressure, adequacy of pulmonary ventilation, and response to care were monitored throughout the procedure. The physical status of the patient was re-assessed after the procedure. The risks and benefits of the procedure and the sedation options and risks were discussed with the patient and/or family including the risk of injury to internal organs (including perforation) and the risk of missed lesions. All questions were answered and informed consent was obtained. Patient identification and proposed procedure were verified by the physician, the nurse, the anesthetist and the technician in the pre-procedure area and in the procedure room. After this verification the upper endoscope was passed  under direct vision. Anatomic landmarks were photographed. No sedation was administered by endoscopist. Sedation was administered by the anesthesia staff. Refer to anesthesia documentation/charting for details regarding medications administered and doses given. Events Procedure Events Event Event Time Specimens No specimens collected Procedure Location [facility] [address] [address] [address] [facility] [id number] Referring Provider No referring provider defined for this encounter. Procedure Provider No name on file     ECG 12 lead (Clinic Performed)    Result Date: 10/19/2023  Atrial sensed ventricular paced rhythm.    XR chest 2 views    Result Date: 10/5/2023  STUDY: Chest Radiographs;  10/05/2023, 10:00AM INDICATION: Shortness of breath. COMPARISON: 05/31/2023, 06/14/2022 XR chest ACCESSION NUMBER(S): [id number] ORDERING CLINICIAN: RAMO HAWKINS TECHNIQUE:  Frontal and lateral chest. FINDINGS: CARDIOMEDIASTINAL SILHOUETTE: The patient status post median sternotomy. There is a dual lead pacemaker present. Cardiomediastinal silhouette is enlarged.  LUNGS: There are bibasilar infiltrates. These are linear and may represent subsegmental atelectasis but infection cannot be excluded.  ABDOMEN: No remarkable upper abdominal findings.  BONES: No acute osseous changes.    Bibasilar pulmonary infiltrates most likely representing atelectasis. Signed by Teo Nunez MD    CT head wo IV contrast    Result Date: 10/5/2023  Interpreted By:  Geovany Sebastian, STUDY: CT HEAD WO IV CONTRAST;  10/5/2023 9:41 am   INDICATION: Signs/Symptoms:HS.   COMPARISON: 02/21/2022   ACCESSION NUMBER(S): [id number]   ORDERING CLINICIAN: RAMO HAWKINS   TECHNIQUE: Noncontrast axial CT scan of head was performed. Angled reformats in brain and bone windows were generated. The images were reviewed in bone, brain, blood and soft tissue windows.   FINDINGS: No acute edema. No acute hemorrhage. No mass  effect. Ventricular system is normal for age. No extra-axial fluid collections. Orbits are normal. Paranasal sinuses are clear.   Dense calcifications of the vertebral arteries consistent with atherosclerosis.       No acute findings.   Signed by: Geovany Sebastian 10/5/2023 9:57 AM Dictation workstation:   [id number]    Point of Care Ultrasound    Result Date: 10/5/2023  Litzy Echeverria MD     10/5/2023  3:42 PM Performed by: Litzy Echeverria MD Authorized by: Litzy Echeverria MD  Procedure: Cardiac Ultrasound Findings:  Views: parasternal long, parasternal short, apical four and subxiphoid Effusion: The pericardial space was visualized and was positive for a PERICARDIAL EFFUSION. Activity: Ventricular contractions were visualized. LV: LV systolic function was DECREASED. RV: RV size was NORMAL. Impression: Cardiac: The focused cardiac ultrasound exam had ABNORMAL findings as specified. Comments: Pericardial effusion but no signs of tamponade.  IVC is plump.

## 2023-10-28 NOTE — CARE PLAN
The patient's goals for the shift include      The clinical goals for the shift include pt will report less blood output in ostomy by end of shift    Over the shift, the patient did not make progress toward the following goals. Barriers to progression include na. Recommendations to address these barriers include na.

## 2023-10-28 NOTE — PROGRESS NOTES
Michael Duran is a 83 y.o. male on day 1 of admission presenting with Gastrointestinal hemorrhage, unspecified gastrointestinal hemorrhage type.      Subjective   Michael Duran is a 83 y.o. male with PMH of CKD, CAD s/p prior CABG, PCI, A-fib no longer on AC, PPM, CHF, HTN, DLD, COPD with no baseline oxygen requirement, prior rectal cancer s/p proctocolectomy with colostomy, recurrent GI bleeds, who presented 10/26/23 with anemia. States that he was having an outpatient follow-up with the heart failure clinic today for increased edema.  Lab work-up demonstrated worsening anemia with hemoglobin of 6.7 and he was sent to the ED. Per patient and daughter he has been having bloody output in his colostomy for the past several weeks, at times dark black and at other times more red appearing.  Does report some weakness, shortness of breath. Was seen at [facility] for similar symptoms earlier this month.  His Eliquis was stopped and he is scheduled for EGD next week for further evaluation. He was transfused 1u PRBC in the ED and additional 1u PRBC 10/27/23. Planning EGD today    10/27/23: No acute events overnight. Vitals stable, BP 94/53, HR 73. Potassium low- supplement. Creatinine at 2.58 (2.62) (baseline ~2.0)- will consult nephro. Hemoglobin 6.6- transfuse 1u PRBC         Review of Systems   Constitutional:  Positive for fatigue. Negative for appetite change, chills, diaphoresis and fever.   HENT:  Negative for congestion, ear pain, facial swelling, hearing loss, nosebleeds, sore throat, tinnitus and trouble swallowing.    Eyes:  Negative for pain.   Respiratory:  Positive for shortness of breath. Negative for cough, chest tightness and wheezing.    Cardiovascular:  Negative for chest pain, palpitations and leg swelling.   Gastrointestinal:  Positive for blood in stool. Negative for abdominal pain, constipation, diarrhea, nausea and vomiting.   Genitourinary:  Negative for dysuria, flank pain, frequency, hematuria and  urgency.   Musculoskeletal:  Negative for back pain and joint swelling.   Skin:  Negative for rash and wound.   Neurological:  Negative for dizziness, syncope, weakness, light-headedness, numbness and headaches.   Hematological:  Does not bruise/bleed easily.   Psychiatric/Behavioral:  Negative for behavioral problems, hallucinations and suicidal ideas.           Objective     Last Recorded Vitals  /60 (BP Location: Left arm, Patient Position: Lying)   Pulse 71   Temp 35.9 °C (96.6 °F) (Temporal)   Resp 16   Wt 86.6 kg (191 lb)   SpO2 96%     Image Results  EGD    Result Date: 10/27/2023  Table formatting from the original result was not included. Impression The esophagus appeared normal. 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 The duodenal bulb, 1st part of the duodenum and 2nd part of the duodenum appeared normal. Findings The esophagus appeared normal. No Esophageal varices. 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 No bleeding at the end of the procedure. The stomach appeared normal. The duodenal bulb, 1st part of the duodenum and 2nd part of the duodenum appeared normal. Recommendation  Follow up with primary gastroenterologist (Dr. Herbert) - The signs and symptoms of potential delayed complications were discussed with you and your family. If you have any concerns or develop any signs or symptoms of delayed complications you can call my office at [phone]. If you experience significant abdominal pain or bleeding you should proceed directly to the nearest Emergency Room or call 911. - Written post-procedure instructions were provided to the patient. - advance diet as tolerated - Continue twice  daily PPI. - Use Sucralfate (1 gram oral suspension QID) for 14 days. Indication Anemia, unspecified type Michael Duran is a 83 y.o. male with a significant past medical history of CKD, colon cancer s/p total colectomy, anemia, CAD, CHF, A-fib, COPD, and HTN who presents for EGD to treat upper GI bleeding. He has had longstanding issues with upper GI bleeding and has undergone multiple upper endoscopies with intervention including injection/clip placement for Dieulafoy lesion, variceal banding, and APC treatment of GAVE. Post Procedure Diagnosis GAVE Staff Staff Role No Staff Documented Medications See Anesthesia Record. Preprocedure A history and physical has been performed, and patient medication allergies have been reviewed. The patient's tolerance of previous anesthesia has been reviewed. The risks and benefits of the procedure and the sedation options and risks were discussed with the patient. All questions were answered and informed consent obtained. Details of the Procedure The patient underwent monitored anesthesia care, which was administered by an anesthesia professional. The patient's blood pressure, level of consciousness, oxygen, respirations, heart rate, ECG and ETCO2 were monitored throughout the procedure. The scope was introduced through the mouth and advanced to the second part of the duodenum. Retroflexion was performed in the cardia. The patient's estimated blood loss was minimal (<5 mL). The procedure was not difficult. The patient tolerated the procedure well. There were no apparent adverse events. Prior to the procedure, a History and Physical was performed, and patient medications and allergies were reviewed. Immediately prior to the administration of medications, the patient was re-assessed for adequacy to receive sedatives. The heart rate, respiratory rate, oxygen saturations, blood pressure, adequacy of pulmonary ventilation, and response to care were monitored throughout the procedure.  The physical status of the patient was re-assessed after the procedure. The risks and benefits of the procedure and the sedation options and risks were discussed with the patient and/or family including the risk of injury to internal organs (including perforation) and the risk of missed lesions. All questions were answered and informed consent was obtained. Patient identification and proposed procedure were verified by the physician, the nurse, the anesthetist and the technician in the pre-procedure area and in the procedure room. After this verification the upper endoscope was passed under direct vision. Anatomic landmarks were photographed. No sedation was administered by endoscopist. Sedation was administered by the anesthesia staff. Refer to anesthesia documentation/charting for details regarding medications administered and doses given. Events Procedure Events Event Event Time Specimens No specimens collected Procedure Location [address] [id number] Referring Provider No referring provider defined for this encounter. Procedure Provider No name on file     ECG 12 lead (Clinic Performed)    Result Date: 10/19/2023  Atrial sensed ventricular paced rhythm.    XR chest 2 views    Result Date: 10/5/2023  STUDY: Chest Radiographs;  10/05/2023, 10:00AM INDICATION: Shortness of breath. COMPARISON: 05/31/2023, 06/14/2022 XR chest ACCESSION NUMBER(S): [id number] ORDERING CLINICIAN: RAMO HAWKINS TECHNIQUE:  Frontal and lateral chest. FINDINGS: CARDIOMEDIASTINAL SILHOUETTE: The patient status post median sternotomy. There is a dual lead pacemaker present. Cardiomediastinal silhouette is enlarged.  LUNGS: There are bibasilar infiltrates. These are linear and may represent subsegmental atelectasis but infection cannot be excluded.  ABDOMEN: No remarkable upper abdominal findings.  BONES: No acute osseous changes.    Bibasilar pulmonary infiltrates  most likely representing atelectasis. Signed by Teo Nunez MD    CT head wo IV contrast    Result Date: 10/5/2023  Interpreted By:  Geovany Sebastian, STUDY: CT HEAD WO IV CONTRAST;  10/5/2023 9:41 am   INDICATION: Signs/Symptoms:HS.   COMPARISON: 02/21/2022   ACCESSION NUMBER(S): [id number]   ORDERING CLINICIAN: RAMO ECHEVERRIA   TECHNIQUE: Noncontrast axial CT scan of head was performed. Angled reformats in brain and bone windows were generated. The images were reviewed in bone, brain, blood and soft tissue windows.   FINDINGS: No acute edema. No acute hemorrhage. No mass effect. Ventricular system is normal for age. No extra-axial fluid collections. Orbits are normal. Paranasal sinuses are clear.   Dense calcifications of the vertebral arteries consistent with atherosclerosis.       No acute findings.   Signed by: Geovany Sebastian 10/5/2023 9:57 AM Dictation workstation:   [id number]    Point of Care Ultrasound    Result Date: 10/5/2023  Ramo Echeverria MD     10/5/2023  3:42 PM Performed by: Ramo Echeverria MD Authorized by: Ramo Echeverria MD  Procedure: Cardiac Ultrasound Findings:  Views: parasternal long, parasternal short, apical four and subxiphoid Effusion: The pericardial space was visualized and was positive for a PERICARDIAL EFFUSION. Activity: Ventricular contractions were visualized. LV: LV systolic function was DECREASED. RV: RV size was NORMAL. Impression: Cardiac: The focused cardiac ultrasound exam had ABNORMAL findings as specified. Comments: Pericardial effusion but no signs of tamponade.  IVC is plump.       Lab Results  Results for orders placed or performed during the hospital encounter of 10/26/23 (from the past 24 hour(s))   Hemoglobin and hematocrit, blood   Result Value Ref Range    Hemoglobin 7.5 (L) 13.5 - 17.5 g/dL    Hematocrit 23.1 (L) 41.0 - 52.0 %   CBC   Result Value Ref Range    WBC 3.1 (L) 4.4 - 11.3 x10*3/uL    nRBC 0.0 0.0 - 0.0 /100 WBCs    RBC 2.20 (L)  4.50 - 5.90 x10*6/uL    Hemoglobin 6.6 (L) 13.5 - 17.5 g/dL    Hematocrit 20.7 (L) 41.0 - 52.0 %    MCV 94 80 - 100 fL    MCH 30.0 26.0 - 34.0 pg    MCHC 31.9 (L) 32.0 - 36.0 g/dL    RDW 20.3 (H) 11.5 - 14.5 %    Platelets 129 (L) 150 - 450 x10*3/uL    MPV 10.4 7.5 - 11.5 fL   Comprehensive metabolic panel   Result Value Ref Range    Glucose 88 74 - 99 mg/dL    Sodium 134 (L) 136 - 145 mmol/L    Potassium 3.4 (L) 3.5 - 5.3 mmol/L    Chloride 99 98 - 107 mmol/L    Bicarbonate 27 21 - 32 mmol/L    Anion Gap 11 10 - 20 mmol/L    Urea Nitrogen 65 (H) 6 - 23 mg/dL    Creatinine 2.58 (H) 0.50 - 1.30 mg/dL    eGFR 24 (L) >60 mL/min/1.73m*2    Calcium 7.5 (L) 8.6 - 10.3 mg/dL    Albumin 2.8 (L) 3.4 - 5.0 g/dL    Alkaline Phosphatase 93 33 - 136 U/L    Total Protein 6.0 (L) 6.4 - 8.2 g/dL    AST 69 (H) 9 - 39 U/L    Bilirubin, Total 0.7 0.0 - 1.2 mg/dL    ALT 30 10 - 52 U/L   Hemoglobin and hematocrit, blood   Result Value Ref Range    Hemoglobin 6.6 (L) 13.5 - 17.5 g/dL    Hematocrit 20.7 (L) 41.0 - 52.0 %   Prepare RBC: 1 Units   Result Value Ref Range    PRODUCT CODE T9984E30     Unit Number [id number]     Unit ABO O     Unit RH POS     XM INTEP COMP     Dispense Status IS     Blood Expiration Date November 20, 2023 23:59 EST     PRODUCT BLOOD TYPE 5100     UNIT VOLUME 350         Medications  Scheduled medications:  amiodarone, 200 mg, oral, Daily  [Held by provider] eplerenone, 25 mg, oral, Daily  furosemide, 80 mg, oral, Daily  levothyroxine, 25 mcg, oral, Daily  pantoprazole, 40 mg, oral, BID AC  polyethylene glycol, 17 g, oral, Daily  rosuvastatin, 5 mg, oral, Daily  sucralfate, 1 g, oral, Before meals & nightly  tafamidis, 61 mg, oral, Daily  tamsulosin, 0.4 mg, oral, Daily  traMADol, 50 mg, oral, BID      Continuous medications:     PRN medications:  PRN medications: acetaminophen **OR** acetaminophen **OR** acetaminophen, acetaminophen **OR** acetaminophen **OR** acetaminophen, melatonin     Physical  Exam  Constitutional:       General: He is not in acute distress.     Appearance: Normal appearance.   HENT:      Head: Normocephalic and atraumatic.      Right Ear: External ear normal.      Left Ear: External ear normal.      Nose: Nose normal.      Mouth/Throat:      Mouth: Mucous membranes are moist.      Pharynx: Oropharynx is clear.   Eyes:      Extraocular Movements: Extraocular movements intact.      Pupils: Pupils are equal, round, and reactive to light.      Comments: Conjunctival pallor   Cardiovascular:      Rate and Rhythm: Normal rate and regular rhythm.      Pulses: Normal pulses.      Heart sounds: Normal heart sounds.   Pulmonary:      Effort: Pulmonary effort is normal. No respiratory distress.      Breath sounds: Normal breath sounds. No wheezing, rhonchi or rales.   Abdominal:      General: Abdomen is flat. Bowel sounds are normal.      Palpations: Abdomen is soft.      Tenderness: There is no abdominal tenderness. There is no right CVA tenderness, left CVA tenderness, guarding or rebound.      Comments: Ostomy with formed brown stool, no melena or hematochezia noted   Musculoskeletal:         General: No swelling. Normal range of motion.      Cervical back: Normal range of motion and neck supple.   Skin:     General: Skin is warm and dry.      Capillary Refill: Capillary refill takes less than 2 seconds.      Findings: No lesion or rash.   Neurological:      General: No focal deficit present.      Mental Status: He is alert and oriented to person, place, and time. Mental status is at baseline.   Psychiatric:         Mood and Affect: Mood normal.         Behavior: Behavior normal.                  Code Status  Full Code     Assessment/Plan   This patient currently has cardiac telemetry ordered; if you would like to modify or discontinue the telemetry order, click here to go to the orders activity to modify/discontinue the order.      Gastrointestinal hemorrhage, unspecified gastrointestinal  hemorrhage type  Acute on chronic anemia with recent reported bloody stools  S/p 2u PRBC thus far  GI consult, appreciate further recs- Planning EGD today  Stool in ostomy noted to be formed and brown    COPD (chronic obstructive pulmonary disease) (CMS/[facility])  No baseline oxygen requirement.  Continue medications    Chronic diastolic congestive heart failure (CMS/[facility])  Increased bilateral lower extremity edema.  Will give additional Lasix with transfusion and monitor fluid status closely    Atrial flutter (CMS/[facility])  Not on AC due to history of bleeding.  Continue home rate controlling medications    Atherosclerotic heart disease of native coronary artery without angina pectoris  S/p prior CABG and PCI.  Continue home medications    Acute renal failure superimposed on chronic kidney disease (CMS/[facility])  Presumed related to anemia  Has seemed to been progressed to possibly new normal of 2.0 lately  Nephrology consult appreciated  Continue lasix for now as he is getting blood products but may need to hold  Avoid other nephrotoxins  Monitor kidney function closely    Hypokalemia  Replace  Recheck in AM    Acute on chronic blood loss anemia  As stated elsewhere  Transfuse to maintain hgb >7.0 but ideally >9.0 given known h/o CAD  May need to consider dc aspirin        DVT ppx: avoid pharmacological ppx given concern for GIB and acute on chronic anemia       Maureen Larkin PA-C

## 2023-10-28 NOTE — ASSESSMENT & PLAN NOTE
As stated elsewhere  Transfuse to maintain hgb >7.0 but ideally >9.0 given known h/o CAD  May need to consider dc aspirin

## 2023-10-28 NOTE — PROGRESS NOTES
[facility] Progress Note    Michael Duran is a 83 y.o. male on day 2 of admission presenting with Gastrointestinal hemorrhage, unspecified gastrointestinal hemorrhage type.      Subjective   NAEON  Resting in bed  Appetite fair  Ostomy+  No issues with UOP    ROS -ve unless mentioned  No change in PFSH       Objective     Constitutional:  Alert, awake, no apparent distress  Eyes: No icterus, no pallor  HEENT: no pallor/cyanosis or icterus  Cardiovascular:  S1, S2 without m/r/g  Respiratory:  CTA B without w/r/r  Abdomen: +bs, soft, nt, ostomy+  Ext: no LE edema  Neck: supple, no thyroid enlargement, no JVD elevation  Skin: warm, moist, no rashes         Vitals 24HR  Heart Rate:  [70-75]   Temp:  [35.9 °C (96.6 °F)-36.8 °C (98.2 °F)]   Resp:  [12-22]   BP: ()/(50-69)   SpO2:  [91 %-100 %]       Intake/Output last 3 Shifts:    Intake/Output Summary (Last 24 hours) at 10/28/2023 0726  Last data filed at 10/27/2023 1715  Gross per 24 hour   Intake 290 ml   Output 1150 ml   Net -860 ml     Results from last 7 days   Lab Units 10/28/23  0426 10/27/23  0609 10/26/23  2322 10/26/23  1816 10/26/23  0924   WBC AUTO x10*3/uL 3.2* 3.1*  --   --  4.2*   HEMOGLOBIN g/dL 8.4* 6.6*  6.6* 7.5*   < > 6.7*   HEMATOCRIT % 26.3* 20.7*  20.7* 23.1*   < > 21.7*   PLATELETS AUTO x10*3/uL 130* 129*  --   --  130*    < > = values in this interval not displayed.     Results from last 7 days   Lab Units 10/28/23  0426 10/27/23  0609 10/26/23  0924   SODIUM mmol/L 133* 134* 131*   POTASSIUM mmol/L 3.0* 3.4* 3.7   CHLORIDE mmol/L 98 99 98   CO2 mmol/L 26 27 23   BUN mg/dL 53* 65* 68*   CREATININE mg/dL 2.15* 2.58* 2.62*   GLUCOSE mg/dL 81 88 79   CALCIUM mg/dL 8.2* 7.5* 7.8*           Assessment/Plan   BRUNO/ATN 2/2 hypotensive hypoperfusion injury in setting of GIB + diuretic +Eplerenone+sglt2i use  Hypokalemia, acute  Anemia, ABL 2/2 UGIB  Acute on chronic HFrEF 45%  Anasarca  CKD 3a  Hypotension  Hx of rectal CA s/p colectomy  with colostomy in place  Cirrhosis of liver           Recommendation:  -Scr now improving, bl~1.2-1.5, non oliguric, BP soft but stable  -Avoid further hypotension, nephrotoxins, and IV contrast  -Ok to c/w oral Lasix 80 mg at home dose  -Hold YASMIN blockade: Eplerenone and Jardiance  -BRUNO workup ordered: UA and urine studies (not done)  -Bladder scan x1 for pvr, place rivera for >250cc  -Dose all meds per eGFR <20  -K supplemented again 10/28  -Await urology recs  -Noted no varices on EGD  -Transfuse for Hb <7  -S/p EGD today and further work up for GIB per GI   -Ok for discharge planning from renal standpoint, outpatient BMP ordered and needs close follow up with nephrology in 1 week  -We will follow closely with you        Thank you for the consult and the opportunity to participate in the care of this patient. Please do not hesitate to call with any questions or concerns.    Ivanna Monsivais APRN, CNP  [facility], [facility]  [phone] office

## 2023-10-28 NOTE — DISCHARGE SUMMARY
Discharge Diagnosis  Acute on chronic blood loss anemia due to upper GI bleed secondary to GAVE requiring blood transfusion  Chronic diastolic heart failure  Paroxysmal atrial flutter  Acute kidney injury on superimposed CKD stage III  COPD  Hypokalemia    Issues Requiring Follow-Up  1.  Please follow with your family doctor in 1 week's time.  2.  Please arrange to be seen in the urology clinic in 1 to 2 weeks time.  3.  Please follow with your colorectal surgeon in 2 weeks time.  4.  The gastroenterology clinic will arrange follow-up EGD for GAVE    Discharge Meds     Your medication list        ASK your doctor about these medications        Instructions Last Dose Given Next Dose Due   amiodarone 200 mg tablet  Commonly known as: Pacerone           aspirin 81 mg chewable tablet      Chew 1 tablet (81 mg) once daily. Do not start before October 12, 2023.       diphenoxylate-atropine 2.5-0.025 mg tablet  Commonly known as: Lomotil           empagliflozin 25 mg  Commonly known as: Jardiance           eplerenone 25 mg tablet  Commonly known as: Inspra           furosemide 80 mg tablet  Commonly known as: Lasix      Take 1 tablet (80 mg) by mouth once daily.       levothyroxine 25 mcg tablet  Commonly known as: Synthroid, Levoxyl           pantoprazole 40 mg EC tablet  Commonly known as: ProtoNix      Take 1 tablet (40 mg) by mouth once daily. Do not crush, chew, or split.       potassium chloride CR 10 mEq ER tablet  Commonly known as: Klor-Con           rosuvastatin 5 mg tablet  Commonly known as: Crestor      Take 1 tablet (5 mg) by mouth once daily.       tamsulosin 0.4 mg 24 hr capsule  Commonly known as: Flomax           traMADol 50 mg tablet  Commonly known as: Ultram           Vyndamax 61 mg capsule  Generic drug: tafamidis                    Test Results Pending At Discharge  Pending Labs       No current pending labs.            Hospital Course  Michael Duran is a 83 y.o. male with PMH of CKD, CAD s/p prior  CABG, PCI, A-fib no longer on AC, PPM, CHF, HTN, DLD, COPD with no baseline oxygen requirement, prior rectal cancer s/p proctocolectomy with colostomy, recurrent GI bleeds, who presented 10/26/23 with anemia. States that he was having an outpatient follow-up with the heart failure clinic today for increased edema.  Lab work-up demonstrated worsening anemia with hemoglobin of 6.7 and he was sent to the ED. Per patient and daughter he has been having bloody output in his colostomy for the past several weeks, at times dark black and at other times more red appearing.  Does report some weakness, shortness of breath. Was seen at [facility] for similar symptoms earlier this month.  His Eliquis was stopped and he is scheduled for EGD next week for further evaluation. He was transfused 1u PRBC in the ED and additional 1u PRBC 10/27/23. Planning EGD today     10/27/23: No acute events overnight. Vitals stable, BP 94/53, HR 73. Potassium low- supplement. Creatinine at 2.58 (2.62) (baseline ~2.0)- will consult nephro. Hemoglobin 6.6- transfuse 1u PRBC  10/28: No acute events overnight.  Vitals remained stable.  H&H is up to 8.4.  GI recommendations appreciated.  We will plan discharge home today.  Continue Carafate 1 g 4 times daily for 14 days.  Continue pantoprazole 40 mg twice a day.  See discharge instructions.    Discharge planning took more than 35 minutes.    Pertinent Physical Exam At Time of Discharge  Physical Exam  CONSTITUTIONAL - alert, in no acute distress, not ill-appearing  SKIN - normal skin color ,warm  HEAD - no trauma, normocephalic  EYES - pupils are equal and reactive to light  CHEST - clear to auscultation, no wheezing, no crackles and no rales, good effort  CARDIAC - regular rate and regular rhythm, no murmur  ABDOMEN - no organomegaly, soft, nontender, nondistended, colostomy in the right quadrant  Genitourinary -penile edema, slight scrotal edema  EXTREMITIES - no edema, no deformities  NEUROLOGICAL -  alert, oriented x3 and no acute focal signs  PSYCHIATRIC - alert, pleasant and cordial, age-appropriate    Outpatient Follow-Up  Future Appointments   Date Time Provider Department Center   11/2/2023 11:00 AM POR OR BRONCH RM POROR [facility]   11/7/2023  9:15 AM [facility] [id number]  [id number] [facility]   11/7/2023  9:30 AM Edwar Aguiar MD [id number] [facility]   11/8/2023 10:40 AM Heena Stout MD PhD [id number] [facility]   11/29/2023  1:20 PM Olivia Jerez MD [id number] [facility]   11/6/2024 10:00 AM Bev Alston MD [id number] [facility]         Marina Alvarez MD

## 2023-10-30 NOTE — TELEPHONE ENCOUNTER
----- Message from Lakisha Sweeney MA sent at 10/30/2023 10:04 AM EDT -----  Regarding: RE: schedule EGD  PATIENT IS SCHEDULED FOR 12.7.23 WITH DR. WILSON IN OR  ----- Message -----  From: Jassi Wilson DO  Sent: 10/30/2023   9:20 AM EDT  To: Lakisha Sweeney MA  Subject: RE: schedule EGD                                 Yea cancelable him for Thursday   ----- Message -----  From: Lakisha Sweeney MA  Sent: 10/30/2023   9:17 AM EDT  To: Jassi Wilson DO  Subject: RE: schedule EGD                                 I know you mentioned you wanted to review his EGD first. Do you concur with Dr. Menjivar and recommend a 4-6 week repeat EGD? Thank you!  ----- Message -----  From: Prasanna Menjivar MD  Sent: 10/27/2023   1:12 PM EDT  To: Jassi Wilson DO; Lakisha Sweeney MA  Subject: schedule EGD                                     Patient of Dr. Wilson's.    He had EGD today while admitted to the hospital with treatment of GAVE. He needs a repeat EGD scheduled with Dr. Wilson in about 4-6 weeks for retreatment.    Should be scheduled in the OR. He has been taken off his Eliquis and I don't think it is going to be restarted, but when you schedule with him please verify that he is off it.    I believe there is an active order in the EMR for EGD, but let me know if you need another one.      Thanks.      -k

## 2023-11-02 PROBLEM — I50.42 CHRONIC COMBINED SYSTOLIC AND DIASTOLIC HEART FAILURE (MULTI): Status: ACTIVE | Noted: 2023-01-01

## 2023-11-02 NOTE — PROGRESS NOTES
Subjective   Patient ID: Michael Duran is a 83 y.o. male who presents for follow-up of congestive heart failure.     Current Outpatient Medications:     amiodarone (Pacerone) 200 mg tablet, Take 1 tablet (200 mg) by mouth once daily., Disp: , Rfl:     diphenoxylate-atropine (Lomotil) 2.5-0.025 mg tablet, Take 2 tablets by mouth 4 times a day as needed for diarrhea., Disp: , Rfl:     empagliflozin (Jardiance) 25 mg, Take 1 tablet (25 mg) by mouth once daily., Disp: , Rfl:     eplerenone (Inspra) 25 mg tablet, Take 1 tablet (25 mg) by mouth once daily., Disp: , Rfl:     furosemide (Lasix) 80 mg tablet, Take 1 tablet (80 mg) by mouth once daily. Restart on 11/01/23, Disp: , Rfl:     levothyroxine (Synthroid, Levoxyl) 25 mcg tablet, Take 1 tablet (25 mcg) by mouth once daily., Disp: , Rfl:     pantoprazole (ProtoNix) 40 mg EC tablet, Take 1 tablet (40 mg) by mouth once daily. Do not crush, chew, or split., Disp: 30 tablet, Rfl: 0    potassium chloride CR 10 mEq ER tablet, 1 tablet (10 mEq) once daily., Disp: , Rfl:     rosuvastatin (Crestor) 5 mg tablet, Take 1 tablet (5 mg) by mouth once daily., Disp: 30 tablet, Rfl: 11    sucralfate (Carafate) 100 mg/mL suspension, Take 10 mL (1 g) by mouth 4 times a day before meals for 55 doses., Disp: 550 mL, Rfl: 0    tafamidis (Vyndamax) 61 mg capsule, Take 1 capsule (61 mg) by mouth once daily., Disp: , Rfl:     tamsulosin (Flomax) 0.4 mg 24 hr capsule, Take 1 capsule (0.4 mg) by mouth once daily., Disp: , Rfl:     traMADol (Ultram) 50 mg tablet, Take 1 tablet (50 mg) by mouth once daily as needed., Disp: , Rfl:      HPI   Current symptoms include: dyspnea, fatigue, lower extremity edema, and orthopnea. He denies chest pressure/discomfort, near-syncope, palpitations, paroxysmal nocturnal dyspnea, and syncope. He states he is compliant all of the time with his medications. He states he is noncompliant some of the time with his diet.  He was hospitalized just 7 days ago for  acute anemia.  He did have GI evaluation and was found to have multiple small areas of irritation and bleeding in his colon.  He is now on Protonix and Carafate.  He states he has had no obvious bleeding since discharge 5 days ago.  Weight remains the same as last visit.  He has had worsening of the edema in the lower legs extending into the groin.  He has had worsening shortness of breath and is orthopneic.  No fever or productive cough.  We discussed lack of progress with diuretic therapy and medical therapy concerning the heart failure.  I have recommended that he consider palliative or hospice care.  He tells me that he has discussed hospice care previously but at that point did not feel that he was ready for it.    Review of Systems   Constitutional:  Negative for activity change, chills and fever.   HENT:  Negative for hearing loss.    Eyes: Negative.    Respiratory:  Negative for cough, chest tightness, shortness of breath and wheezing.    Cardiovascular:  Negative for chest pain, palpitations and leg swelling.   Gastrointestinal:  Negative for abdominal distention and blood in stool.   Genitourinary:  Negative for hematuria.   Neurological:  Negative for syncope, weakness and light-headedness.   Psychiatric/Behavioral:  Negative for confusion.        Objective     /72 (BP Location: Left arm, Patient Position: Sitting, BP Cuff Size: Adult)   Pulse 71   Resp 20   Wt 87.2 kg (192 lb 3.2 oz)   SpO2 93%   BMI 29.22 kg/m²     April 2023 Echocardiogram CONCLUSIONS:  1. Left ventricular systolic function is mildly decreased with a 45% estimated ejection fraction.  2. Spectral Doppler shows a restrictive pattern of left ventricular diastolic filling.  3. There is severe concentric left ventricular hypertrophy.  4. There is low normal right ventricular systolic function.  5. The left atrium is severely dilated.  6. The right atrium is moderately to severely dilated.  7. Mild to moderate mitral valve  regurgitation.  8. Moderate tricuspid regurgitation.  9. Moderately elevated right ventricular systolic pressure.  10. There is global hypokinesis of the left ventricle with minor regional variations.       Lab Results   Component Value Date    BUN 53 (H) 10/28/2023    CREATININE 2.15 (H) 10/28/2023     (H) 10/26/2023    MG 2.28 10/24/2023    K 3.0 (L) 10/28/2023     (L) 10/28/2023       Physical Exam  Constitutional:       Appearance: He is not toxic-appearing.   HENT:      Head: Normocephalic.      Nose: Nose normal.   Eyes:      Conjunctiva/sclera: Conjunctivae normal.   Neck:      Comments: JVD to the angle of the jaw.  Cardiovascular:      Rate and Rhythm: Normal rate and regular rhythm.      Pulses: Normal pulses.      Heart sounds: No murmur heard.  Pulmonary:      Effort: No respiratory distress.      Breath sounds: Wheezing present. No rales.      Comments: Breath sounds are generally diminished  Abdominal:      General: Bowel sounds are normal.      Palpations: Abdomen is soft.   Genitourinary:     Comments: Patient has significant edema of the genitals per his report  Musculoskeletal:         General: Swelling present.      Comments: Patient has firm edema through the lower legs extending into the thighs   Skin:     General: Skin is warm and dry.   Neurological:      General: No focal deficit present.      Mental Status: He is alert and oriented to person, place, and time.   Psychiatric:         Mood and Affect: Mood normal.         Assessment/Plan     Problem List Items Addressed This Visit    None       Combined systolic/diastolic heart failure   Etiology: ischemic   AHA Stage: C   NYHA class: 4  Volume Status: Congested.   GFR:30    GDMT  BB-  ( amiodarone)   ARB/ACEI/ARNI -   MRA - eplerenone   SGLT2i - jardiance  Diuretic - furosemide - change to Torsemide 100 mg daily.   Device Therapy: PPM  CHF:   Weight is the same as last visit.   He has firm edema in to the thighs.   He reports  feeling sob.  He denies PND but does have orthopnea.    Will change furosemide to Torsemide 100 mg daily.  Will give IV Lasix today.  Will plan for repeat IV Lasix drip for tomorrow.   BMP  and magnesium today.   Repeat BMP in a.m.    Prognosis is poor.  Discussed palliative and hospice care today.     2. CKD3 -  Cr. 2.15      3. ASHD:  No angina STATIN/ASA    4. Chronic blood loss anemia:  Recent admission with transfusion to Hb of  8.4.  Now on protonix and Carafate and reports no active bleeding since discharge 5 days ago.     5. PAF:    RRR.  Unable to anticoagulate due to significant ongoing issues with GIB.           Kelli Santiago, APRN-CNP

## 2023-11-02 NOTE — TELEPHONE ENCOUNTER
Secure chat from Meaghan Tobar: patients sister- Lizzie Victoria called and would like you to call her. They received some bad news regarding her brothers condition and would like to speak with you or Alecia Maharaj. her number is [phone].      Called her back.  She was with Perez today at his visit with Kelli.  She discussed palliative care/hospice.  He got some IV diuretic today and plan to get a drip tomorrow.  They would like to have an appointment with Dr. Alston to see if she agrees there are limited options and he is palliative care/hospice appropriate.  Reviewed with Dr. Alston.  She would like to see if metolazone is effective first.  Starting with 5 mg three times per week and increasing to 5 mg daily if necessary.  If that is not effective, she would be happy to see them to discuss.    Discussed with Kelli.  She has tried metolazone in the past and his creatinine was significantly higher with just one dose.  She will try it again tomorrow.  Dr. Alston updated.      Called Lizzie back and updated her as well.

## 2023-11-02 NOTE — PATIENT INSTRUCTIONS
Thank you for coming in today.  If you have any questions you may contact the office Monday through Friday at [phone] or on week ends at [phone].     Stop the Furosemide     Start Torsemide 100 mg daily     Please follow  a 2 GM sodium diet and limit fluid intake to 2 liters per day or 8 servings ( serving size = 8 oz. = 1 cup = 240 ml) per day.   Please avoid processed meat products (luncheon meats, sausages, andino, hot dogs for example) eat 4 servings of vegetables and 1-2 whole servings of whole fruits per day.   Please weigh daily and call [phone] for weight gain of 3 pounds in 24 hours or 5 pounds or if you experience increased swelling or shortness of breath.    Follow up tomorrow for repeat evaluation and IV diuretic.

## 2023-11-07 NOTE — PATIENT INSTRUCTIONS
Follow up in 6 months.     Lab appointments are no longer scheduled. Please come into the office at least 1 day prior to scheduled Doctor appointment for blood work.

## 2023-11-07 NOTE — PROGRESS NOTES
Patient Visit Information:   Visit Type: Follow Up Visit      History of Present Illness:      ID Statement:    LUIS CARLOS AMIN is a 82 year old Male        Chief Complaint: Vertebral lesion and adenopathy   Interval History:    11/07/23  Mr. Amin returns today for follow-up of adenopathy and bone lesion.  He presented the ED on 5/10/2022 with chest discomfort, was found to have severe CAD and had  PCA.  He feels better now.  He has chronic arthralgias, mostly in his knees.  He denies any back pain, shortness of breath, neurologic symptoms, bleeding from any site, fevers, chills or night sweats.  Patient was also recently diagnosed cardiac amyloidosis  PET scan was repeated in June 2023 but did show abnormal metabolic activity of lymph node at the 5-year significantly improved.    In October 2023 patient admitted to hospitalist [facility] because of severe anemia and congestive cardiac failure.  Patient diagnosed iron deficiency anemia upper GI bleed and GI evaluation done patient was cauterized no more bleeding last hemoglobin was 8.3.    CAT scan of the abdominal pelvis did show nodular liver questionable cirrhosis of liver.    Patient not very active patient with peripheral edema, chronic shortness of breath due to congestive cardiac failure.  Hemoglobin has been stable, PET scan result discussed with patient        Past medical history: Rectal cancer stage IIa (T3 N0 M0) status post proctocolectomy on 9/20/2018; no adjuvant therapy given.  PET/CT done May 2019 showed slight hypermetabolic activity in mediastinal and abdominal nodes of uncertain significance.  Follow-up  CTs showed small, stable nodes.  CT in December 2021 showed tiny bilateral pulmonary nodules, RP node measuring 1.8 cm, chronic changes.  PET/CT December 2021 showed no abnormal uptake in pulmonary nodules, mildly increased uptake at left periaortic node  with SUV 3.6 (previously 2.3), no other adenopathy.  A new area of hypermetabolic activity  seen at T5 vertebral body with SUV 4.6, suspicious for malignancy.  On 2021 MRI thoracic spine showed lesion at T5 vertebral body measuring 1.1 x 1.0 cm.   Also noted were moderate to severe spinal canal stenosis, multilevel disc disease and other degenerative changes.   MRI T-spine 2022 shows slight increase in size of T5 lesion measuring up to 1.7 cm, but otherwise stable.  22 , MRI , Conspicuity of a T1 hypointense lesion located within the T5 vertebral body has decreased, however overall size appears unchanged.  2. No new regions of signal abnormality are seen within the  visualized osseous structures    , PET scan, interval decreased FDG G activity associated with multiple previously visualized hypermetabolic lymph node as well as a T5 vertebral body lesion, no new hypermetabolic lesion is seen  PMH.  CAD, CABG, dyslipidemia, GERD, degenerative arthritis, degenerative disc disease, spinal canal stenosis, bilateral TKA and subsequent right knee surgery for osteomyelitis, bradycardia and PAF, status post pacemaker placement 2021, mild/moderate  mitral and tricuspid valve regurgitation, LVH, ulcerative colitis, BPH, right rotator cuff surgery, adenopathy as described above.  History no other history of malignancy, MI, CVA or VTE disease.  He is also diagnosed cardiac amyloidosis    , CAT scan of abdominal pelvis did show nodular liver possible underlying cirrhosis of liver  Social history: Smoked up to 2 PPD for 6 years, quit 50 years ago.  Admits to drinking alcohol moderately in the past, but none recently.  Retired .  No toxic substance exposure.      Family medical history: Father  of abdominal cancer.     Review of Systems:   Review of Systems:    Constitutional: No fever, chills, night sweats.  Mild fatigue.  Head and neck: No headaches or dizziness.  No pain, stiffness.   HEENT: No sore throat, sinusitis.  Hearing is adequate. Vison is adequate.   Cardiac: No  chest pain, palpitations, lightheadedness.   Respiratory: No increased dyspnea, cough, hemoptysis.   GI: Appetite is good, weight stable.  No constipation, diarrhea, change in bowel movements, melena, hematochezia. No abdominal pain, nausea, vomiting.   Genitourinary: No frequency, urgency.  Has nocturia.  No polyuria, dysuria, hematuria.   Musculoskeletal: No worsening bone or joint pain. Chronic low back pain and arthralgias in shoulders and knees.  No mid/upper back or neck pain.  Endocrine: No diabetes, thyroid disease.   Skin: No rash, skin lesions, itching. Easy bruisability.  Neuromuscular: No lightheadedness, dizziness.  Some loss of balance attributed to peripheral neuropathy.  No history of seizure.  Complains of numbness, paresthesias in both feet and in right hand. No focal weakness, tremor.                    Allergies and Intolerances:       Allergies:         rosuvastatin: Drug, Unknown, Active         eptifibatide: Drug, Unknown, Active         pravastatin: Drug, Unknown, Active         ezetimibe-simvastatin: Drug, Unknown, Active         ezetimibe: Drug, Unknown, Active         Crestor: Drug, Unknown, Active         metoprolol: Drug, Unknown, Active         Vytorin: Drug, Unknown, Active         Integrilin: Drug, Unknown, Active         Pravachol: Drug, Unknown, Active         Zetia: Drug, Unknown, Active         ACE inhibitors: Drug Category, Unknown, Active     Outpatient Medication Profile:  * Patient Currently Takes Medications as of 22-Dec-2022 10:58 documented in Structured Notes         acetaminophen 325 mg oral tablet : Last Dose Taken:  , 2 tab(s) orally every 6 hours, As needed, Pain - Mild (1-3), Start Date: 26-Oct-2022         loperamide 2 mg oral capsule: Last Dose Taken:  , 3 cap(s) orally four  times daily , Start Date: 11-Oct-2018         tamsulosin 0.4 mg oral capsule: Last Dose Taken:  , 1 cap(s) orally once  a day         eplerenone 25 mg oral tablet: Last Dose Taken:  , 1 tab(s)  orally once  a day         amiodarone 200 mg oral tablet: Last Dose Taken:  , 1 tab(s) orally once  a day         zolpidem 5 mg oral tablet: Last Dose Taken:  , 1 tab(s) orally once a  day (at bedtime), As Needed         traMADol 50 mg oral tablet: Last Dose Taken:  , 1 tab(s) orally 2 times  a day, As Needed         levothyroxine 25 mcg (0.025 mg) oral tablet: Last Dose Taken:  , 1 tab(s)  orally once a day         diphenoxylate-atropine 2.5 mg-0.025 mg oral tablet: Last Dose Taken:   , 2 tab(s) orally 4 times a day, As Needed         omeprazole 40 mg oral delayed release capsule: Last Dose Taken:  , 1  cap(s) orally once a day, As Needed         cholestyramine 4 g/5 g oral powder for reconstitution: Last Dose Taken:   , Mix 1 packet in liquid and drink twice per day          celecoxib 200 mg oral capsule: Last Dose Taken:  , 1 cap(s) orally once  a day, As Needed         naloxone 4 mg/0.1 mL nasal spray: Last Dose Taken:  , instill (1) spray  in one nostril as needed for opioid overdose          bumetanide 1 mg oral tablet: Last Dose Taken:  , 1 tab(s) orally once  a day         apixaban 5 mg oral tablet: Last Dose Taken:  , 1 tab(s) orally 2 times  a day         clopidogrel 75 mg oral tablet: Last Dose Taken:  , 1 tab(s) orally once  a day         mirtazapine 30 mg oral tablet: Last Dose Taken:  , 1 tab(s) orally once  a day (at bedtime)         sertraline 50 mg oral tablet: Last Dose Taken:  , 1 tab(s) orally once  a day         ALPRAZolam 0.25 mg oral tablet: Last Dose Taken:  , 1 tab(s) orally every  8 hours, As Needed             Medical History:         Intermittent epigastric abdominal pain: ICD-10: R10.13, Status:  Active         Bradycardia, unspecified: ICD-10: R00.1, Status: Active         GI bleed: ICD-10: K92.2, Status: Active         GI bleed: ICD-10: K92.2, Status: Active         Adenocarcinoma, colon: ICD-10: C18.9, Status: Active         Ulcerative colitis: ICD-10: K51.90, Status: Active      Family History: No Family History items are recorded  in the problem list.      Social History:   Social Substance History:  ·  Smoking Status former smoker (1)   ·  Tobacco Use denies   ·  Alcohol Use denies, history of abuse   ·  Drug Use denies            Vitals and Measurements:   Vitals: Temp: 36.3  HR: 72  RR: 16  BP: 119/69  SPO2%:   96   Measurements: HT(cm): 170.1  WT(kg): 81.5  BSA: 1.96   BMI:  28.1   Last 3 Weights & Heights: Date:                           Weight/Scale Type:                    Height:   22-Dec-2022 10:56                81.5  kg                     170.1  cm  15-Sep-2022 14:04                86.6  kg                     170.1  cm  16-May-2022 12:46                82.6  kg                     170.1  cm      Physical Exam:      Constitutional: Well developed, elderly male, awake/alert/oriented  x3 in no distress.   Eyes: PERRL, EOMI, clear sclera.   ENMT: No external lesions noted.   Head/Neck: Neck with normal movement.  No mass, adenopathy  or tenderness.  No JVD. Trachea midline.   Respiratory/Thorax: Fair air movement on both side   Cardiovascular: Regular, rate and rhythm.  Soft murmur.   No rubs or gallops.   Gastrointestinal: Nondistended.  Postsurgical changes  noted.  Ostomy functioning.  Normal bowel sounds.  Soft, non-tender.  No masses palpable.   Musculoskeletal: ROM relatively intact.  Chronic  joint deformities.     Extremities: Bilateral peripheral edema +4   Neurological: Alert and oriented x3.     Lymphatic: No palpably significant lymphadenopathy      Psychological: Appropriate mood and behavior.   Skin: Warm and dry, no rashes, no suspicious lesions.   Few resolving ecchymoses.         Lab Results:       4.4 - 11.3 x10*3/uL 3.2 Low   3.1 Low    4.2 Low  3.9 Low    nRBC  0.0 - 0.0 /100 WBCs 0.0  0.0   0.0 0.0   RBC  4.50 - 5.90 x10*6/uL 2.82 Low   2.20 Low    2.24 Low  2.34 Low    Hemoglobin  13.5 - 17.5 g/dL 8.4 Low  6.6 Low  6.6 Low  7.5 Low  7.5 Low  6.7 Low  7.3  Low    Hematocrit  41.0 - 52.0 % 26.3 Low  20.7 Low  20.7 Low  23.1 Low  23.4 Low  21.7 Low  23.1 Low    MCV  80 - 100 fL 93  94   97 99   MCH  26.0 - 34.0 pg 29.8  30.0   29.9 31.2   MCHC  32.0 - 36.0 g/dL 31.9 Low   31.9 Low    30.9 Low  31.6 Low    RDW  11.5 - 14.5 % 19.6 High   20.3 High    19.9 High  21.0 High    Platelets  150 - 450 x10*3/uL 130 Low            ·  Results         Radiology Result:     ·  Results               Assessment and Plan:      Assessment and Plan:   Assessment:    1.  1.7 cm lesion in T5 vertebral body, suspicious for malignancy, rule out metastatic rectal cancer versus other malignancy versus benign disease.  Asymptomatic.   No other suspicious bone lesions noted.   PET scan was repeated in June 2023 which did show significant improvement in cough metabolic activity of lymph node and T5 lesion  2.  Mild abdominal adenopathy, stable/slightly decreased rule out reactive versus low-grade lymphoproliferative process or malignancy.  Largest lymph node 1.5 cm.  Asymptomatic.     3.  History of rectal cancer stage IIa (T3 N0 M0), status post proctocolectomy September 2018.  No adjuvant therapy.      4.  Small, bilateral pulmonary nodules, clinically stable, rule out malignant versus benign disease.      5.  History of cardiac disease (CABG, CAD, dysrhythmia, pacer, valve disease, etc.). Recent ACS, status post PCA.      Cardiac amyloidosis  6.    History of iron deficiency anemia, upper GI bleed    7.  Pancytopenia, questionable cirrhosis of liver     Plan: PET scan result discussed with the patient, hemoglobin is stable, patient had pancytopenia which is also stable, no further work-up at this time we will repeat CBC iron study after 6 months.     > 3 0 minutes spent discussing laboratory, radiologic and pathologic findings, the natural history of the disease and treatment as well as documentation of the visit.

## 2023-11-08 NOTE — PATIENT INSTRUCTIONS
Thank you for coming in today. If you have any questions or concerns, you may call the Heart Failure Office at [phone] option [phone], or [phone].  You may also contact our heart failure nursing team via email on [email or web address].    For quicker results set-up your  [facility] account to receive results and other correspondence directly to your phone.    Please bring all your pills/medications to your Cardiology appointments.    **  - Please make the following medication changes:  1.  START subcutaneous furosemide (Furoscix) 80 mg once daily for 1 week, then once a week after that.  Our nurses at [facility] will teach you how to place it this for your first application.  They will let you know when you can come in for this demonstration.  After this you can apply on your own at home    While we are waiting for subcutaneous furosemide to be started, please continue to work with Kelli Santiago and the IV diuretic clinic at [facility] to remove excess fluid.    -Please have blood test done (RFP, BNP) after you use subcutaneous furosemide for 1 week    -Please have blood tests done just before your next visit with Dr. Stout in 3 months ( RFP, BNP, CBC, iron, TIBC, ferritin)    -Please let me know if you decide to proceed with right heart catheterization, for consideration of inotropic therapy.    -Please keep your cardiology appointments with nurse practitioner Sue Santiago    - Please make an appointment to be seen in 3 months by Dr. Stout

## 2023-11-08 NOTE — PROGRESS NOTES
Chief Complaint    Ambulatory heart failure care, cardiac amyloidosis care    History of Present Illness  Referring Clinician: CHARANJIT Santiago  Accompanied by: sister , daughter     HPI:    83 year old retired  who presents for advanced heart failure care. He has a past medical history significant for HFimprovedEF and on TTE 6/2022 LVEF~50-55%. He was previously maintained on full HF GDMT but has had recent de-escalation with the cessation of ARNi and beta-blockade due to symptomatic hypotension.  His other comorbidities include CAD status post CABG, PCI. On left heart cath 6/2022 he did have ISR of the RCA and has been recommended for medical management. He is known to have atrial fibrillation maintained on DOAC and per his report is not pending an ablation procedure.  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 On endomyocardial biopsy 10/2022, ATTR amyloidosis detected and he has been initiated on tafamidis.  Between visits, Mr. 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  He required PRBC transfusion.  He is consequently not maintained on systemic anticoagulation.  He continues to struggle with volume overload and recently received IV furosemide after being seen in the nurse practitioner heart failure clinic.  He has been fully adherent with tafamidis.  There have been discussions with regards to hospice/palliative care with his other cardiology clinicians.  Renal function 11/2023: BUN/SCr 47/1.9   K 4.5    GFR 34    He reports that he has been struggling with fatigue recently that he associates with significant volume overload.  He has been seen frequently in the IV diuretic clinic and reports that he was recently transition from bumetanide to torsemide.  He remains very volume overloaded.  He does  not report much dyspnea related to this, fortunately.  No other cardiovascular symptoms (see review of systems).    He is adherent with heart failure diet, fluid restriction, and HF medications.     His last HF hospitalisation was 10/2023.    Surgical Hx:  -CABG    Social Hx:  - Smoking- quit 50 yrs   - ETOH- never a heavy drinker, nil now   - Illicit drugs- never   - Lives alone     Family Hx:  Specifically, there is no family history of ICD, PPM, LVAD, OHT, arrhythmias, CVA, or sudden cardiac death.    2 Brothers, sister- MI and HF    Medication reconciliation completed, see below.     Investigations:    11/8/2023 ECGa; AV Paced  The electronic medical record has been reviewed by me for salient history. All cardiovascular imaging and testing available in the electronic medical record, and [facility] has been reviewed.     Review of Systems   Constitutional: Positive for weight gain (weight up and rising). Negative for decreased appetite and malaise/fatigue.   Cardiovascular:  Positive for dyspnea on exertion (winded after 10 stairs, walkign short corridor. Progressive worsening) and orthopnea (stable 1 pillow). Negative for chest pain and paroxysmal nocturnal dyspnea.   Respiratory:  Positive for cough and sputum production (black). Negative for hemoptysis and shortness of breath.    Skin:  Negative for color change.   Musculoskeletal:  Positive for arthritis.   Gastrointestinal:  Positive for bloating (started ~ June 2023 , more pressive in 4 weeks). Negative for hematemesis, hematochezia and melena.   Genitourinary:  Negative for hematuria.   Neurological:  Positive for dizziness (following post furosemide infusion and got dizzy.) and loss of balance (on standing).   Psychiatric/Behavioral:  The patient has insomnia.          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.89)   · Anemia, unspecified type (285.9) (D64.9)   · Atherosclerotic heart disease of native coronary artery without angina pectoris (414.01)  (I25.10)   · Atrial flutter, unspecified type (427.32) (I48.92)   · Attention to colostomy (V55.3) (Z43.3)   · Back pain (724.5) (M54.9)   · Benign paroxysmal positional vertigo of right ear (386.11) (H81.11)   · Benign skin lesion of neck (709.9) (L98.9)   · BPH with obstruction/lower urinary tract symptoms (600.01,599.69) (N40.1,N13.8)   · Bradycardia (427.89) (R00.1)   · Burning with urination (788.1) (R30.0)   · CAD (coronary artery disease) (414.00) (I25.10)   · Cancer, colon (153.9) (C18.9)   · Cardiac amyloidosis (277.39,425.7) (E85.4,I43)   · Chronic diarrhea (787.91) (K52.9)   · Chronic diastolic congestive heart failure (428.32,428.0) (I50.32)   · Chronic systolic heart failure (428.22) (I50.22)   · Colitis (558.9) (K52.9)   · Colostomy in place (V44.3) (Z93.3)   · Complication of ostomy   · COPD (chronic obstructive pulmonary disease) (496) (J44.9)   · Decreased radial pulse (785.9) (R09.89)   · Decreased vibratory sense (782.0) (R20.8)   · Diarrhea (787.91) (R19.7)   · Encounter for immunization (V03.89) (Z23)   · Enlarged prostate on rectal examination (600.00) (N40.0)   · Esophagitis (530.10) (K20.90)   · Frequent PVCs (427.69) (I49.3)   · GI bleeding (578.9) (K92.2)   · Gout of right foot (274.9) (M10.9)   · Hematoma of left thigh (924.00) (S70.12XA)   · Hematuria (599.70) (R31.9)   · Herniated nucleus pulposus of lumbosacral region (722.10) (M51.27)   · High risk medications (not anticoagulants) long-term use (V58.69) (Z79.899)   · Hyperlipidemia (272.4) (E78.5)   · Idiopathic hypotension (458.9) (I95.0)   · Insomnia (780.52) (G47.00)   · Iron deficiency (280.9) (E61.1)   · Left hip pain (719.45)  (M25.552)   · Left shoulder pain (719.41) (M25.512)   · Lesion of bone of thoracic spine (733.90) (M89.9)   · Lightheadedness (780.4) (R42)   · Low back pain (724.2) (M54.50)   · Low back pain syndrome (724.2) (M54.50)   · Lumbar radiculitis (724.4) (M54.16)   · Male perineal pain (608.9) (R10.2)   · Medicare annual wellness visit, subsequent (V70.0) (Z00.00)   · Mobitz type I Wenckebach atrioventricular block (426.13) (I44.1)   · Muscle cramps (729.82) (R25.2)   · Nocturia (788.43) (R35.1)   · NYHA Class III cardiovascular function   · Obstructive sleep apnea (327.23) (G47.33)   · Other malaise and fatigue (780.79) (R53.81,R53.83)   · Parastomal hernia (569.69) (K43.5)   · Paroxysmal atrial fibrillation (427.31) (I48.0)   · Pelvic fluid collection (789.59) (R18.8)   · Periodic limb movements of sleep (327.51) (G47.61)   · Post PTCA (V45.82) (Z98.61)   · FREDI Cx to OM2   · Postural dizziness (780.4) (R42)   · Preoperative cardiovascular examination (V72.81) (Z01.810)   · Presence of permanent cardiac pacemaker (V45.01) (Z95.0)   · Primary colon cancer (153.9) (C18.9)   · Prolonged CT interval present on electrocardiography (794.31) (R94.31)   · Prostate cancer screening (V76.44) (Z12.5)   · Pulmonary nodules/lesions, multiple (793.19) (R91.8)   · Puncture wound of finger (883.0) (S61.239A)   · Rectal adenoma (211.4) (D12.8)   · Rectal cancer (154.1) (C20)   · Renal cysts, acquired, bilateral (593.2) (N28.1)   · Sciatica (724.3) (M54.30)   · Shortness of breath (786.05) (R06.02)   · Status post creation of permanent colostomy in right lower quadrant (V44.3) (Z93.3)   · Status post fall (V15.88) (Z91.81)   · Ulcerative pancolitis with complication (556.6) (K51.019)   · Unstable balance (781.2) (R26.89)   · Unsteadiness on feet (781.2) (R26.81)   · Urinary frequency (788.41) (R35.0)   · Ventricular tachycardia (427.1) (I47.20)   · Ventricular trigeminy (427.89) (I49.8)   · Vertigo (780.4) (R42)    Past Medical History    · History of Atherosclerosis of coronary artery bypass graft(s) without angina pectoris  (414.05) (I25.810)   · Resolved Date: 15 Jul 2016   · History of Atypical chest pain (786.59) (R07.89)   · Resolved Date: 17 Sep 2019   · History of Atypical chest pain (786.59) (R07.89)   · Resolved Date: 16 Oct 2019   · History of BPH (benign prostatic hyperplasia) (600.00) (N40.0)   · History of Chest discomfort (786.59) (R07.89)   · Resolved Date: 15 Jul 2016   · History of Coronary artery disease of bypass graft of native heart with stable angina  pectoris (414.05,413.9) (I25.708)   · Resolved Date: 16 Oct 2019   · History of angina pectoris (V12.59) (Z86.79)   · Resolved Date: 15 Jul 2016   · History of chest pain (V13.89) (Z87.898)   · Resolved Date: 15 Jul 2016   · History of chronic back pain (V13.59) (Z87.39)   · History of hematuria (V13.09) (Z87.448)   · Resolved Date: 17 Sep 2019   · History of osteoarthritis (V13.4) (Z87.39)   · History of precordial chest pain (V13.89) (Z87.898)   · Resolved Date: 15 Jul 2016   · History of ulcerative colitis (V12.79) (Z87.19)    Surgical History   · History of Adenoidectomy   · History of CABG   · History of Cardioversion   · History of Cataract Surgery   · History of Cath Atherectomy 3 Left Internal Mammary Graft   · History of Complete Proctectomy Combined APR W/ Colostomy Laparoscopic   · History of Ileostomy   · History of Knee Replacement   · History of Pacemaker insertion   · History of Rotator Cuff Repair   · History of Tonsillectomy   · History of Total Abdominal Colectomy    Social History   · Activities of daily living (ADL's), independent   · Consumes alcohol occasionally (V49.89) (Z78.9)   · Daily caffeine consumption, 2-3 servings a day   · Does not use illicit drugs (V49.89) (Z78.9)   · Employed   · Exercises regularly   · Former smoker (V15.82) (Z87.891)   · Never a smoker   · Single  Current Meds  Medication Documentation Review Audit       Reviewed by Cristel  Darya, RN (Registered Nurse) on 11/08/23 at 1035      Medication Order Taking? Sig Documenting Provider Last Dose Status   amiodarone (Pacerone) 200 mg tablet [id number] Yes Take 1 tablet (200 mg) by mouth once daily. Historical MD Moni Taking Active   diphenoxylate-atropine (Lomotil) 2.5-0.025 mg tablet [id number] Yes Take 2 tablets by mouth 4 times a day as needed for diarrhea. Historical Provider, MD Taking Active   empagliflozin (Jardiance) 25 mg [id number] Yes Take 1 tablet (25 mg) by mouth once daily. Historical Provider, MD Taking Active   eplerenone (Inspra) 25 mg tablet [id number] Yes Take 1 tablet (25 mg) by mouth once daily. Historical Provider, MD Taking Active     Discontinued 11/02/23 1006   levothyroxine (Synthroid, Levoxyl) 25 mcg tablet [id number] Yes Take 1 tablet (25 mcg) by mouth once daily. Historical MD Moni Taking Active   pantoprazole (ProtoNix) 40 mg EC tablet [id number] Yes Take 1 tablet (40 mg) by mouth once daily. Do not crush, chew, or split. SOFÍA Hyde-CNP Taking Active     Discontinued 11/02/23 0838   potassium chloride CR 10 mEq ER tablet [id number] Yes 1 tablet (10 mEq) once daily. Historical MD Moni Taking Active   rosuvastatin (Crestor) 5 mg tablet [id number] Yes Take 1 tablet (5 mg) by mouth once daily. Bev Alston MD Taking Active   sucralfate (Carafate) 100 mg/mL suspension [id number] Yes Take 10 mL (1 g) by mouth 4 times a day before meals for 55 doses. Marina Alvarez MD Taking Active   tafamidis (Vyndamax) 61 mg capsule [id number] Yes Take 1 capsule (61 mg) by mouth once daily. Historical MD Moni Taking Active   tamsulosin (Flomax) 0.4 mg 24 hr capsule [id number] Yes Take 1 capsule (0.4 mg) by mouth once daily. Historical MD Moni Taking Active   torsemide (Demadex) 100 mg tablet [id number] Yes Take 1 tablet (100 mg) by mouth once daily. Kelli K Santiago, APRN-CNP Taking Active   traMADol (Ultram) 50 mg tablet [id number] Yes Take 1 tablet (50  "mg) by mouth once daily as needed. Historical Provider, MD Taking Active                   Vitals    Visit Vitals  /69   Pulse 70   Resp 18   Ht 1.727 m (5' 8\")   Wt 87 kg (191 lb 11.2 oz)   SpO2 97%   BMI 29.15 kg/m²   Smoking Status Former   BSA 2.04 m²         Recorded: 01Mar2023 10:09AM   Heart Rate 68, Apical   Pulse Quality Normal, Apical   Respiration 16   Respiration Quality Normal   Systolic 113, LUE, Sitting   Diastolic 65, LUE, Sitting   Blood Pressure Cuff Size Adult   Weight 173 lb 12.8 oz   BMI Calculated 24.94 kg/m2   BSA Calculated 1.97   O2 Saturation 99, RA     Physical Exam  On examination:    Very pleasant elderly  man in no apparent CP or painful distress  Well groomed   Neck: No JVD or HJR  CVS: HS 1,2. Grade 2 PSM at lower left sternal edge  Resp: CTA bilaterally. Percussion note resonant  Abdomen: Flat. SNT, BS wnl. Colostomy bag in situ   Extremities: 4+ pedal oedema that extends to the scrotum. Bilateral knee replacement scars  Skin: warm and dry  CNS: AO x 4      Results/Data  Surgical Pathology 26Oct2022 10:42AM Juliana Baird     Test Name Result Flag Reference   Case Surgical Pathology (Report)     Name LUIS CARLOS AMIN                                                    Accession #: [id number]       Pathologist:          WEI LIGHT M.D., M.S.  Date of Procedure:  10/26/2022  Date Received:     10/26/2022  Date Reported      10/27/2022  Submitting Physician:  JULIANA BAIRD DO  Location:          [facility] Other External #     Procedures/Addenda Present                                    FINAL DIAGNOSIS  HEART, ENDOMYOCARDIAL BIOPSY:  -- INVOLVED BY AMYLOIDOSIS (SEE COMMENT)  Note  COMMENT: Three pieces of endomyocardium are evaluated by light microscopy, all  of which show focal to diffuse deposition of acellular amorphous eosinophilic  material in the interstitium and in a perimyocyte distribution. Trichrome  stains highlight these areas a violet color. Congo " red stains shows apple green  birefringence under polarized light, indicative of amyloidosis. The paraffin  block will be sent to [facility] for amyloid fibril typing, the results of  which will be reported as an addendum.  An iron stain is negative.  Findings were relayed to Dr. Baird via  email on 10/27/2022 at 4:40 p.m.                                                                                                                                                                                                                                                       Electronically Signed Out By WEI LIGHT M.D., M.S./MK  By the signature on this report, the individual or group listed as making the  Final Interpretation/Diagnosis certifies that they have reviewed this case.  Diagnostic interpretation performed at [facility] [address] [facility] [address]         Addendum/Procedures:  Outside Ancillary Testing   Date Ordered:   11/28/2022   Status: Signed  Out     Date Complete:   11/28/2022        Date Reported:   11/28/2022              Addendum Diagnosis  A complete Amyloid Protein ID result issued by [employer] ([place],  [place]) is on file in the Department of Anatomic Pathology at [facility].    RESULTS:    Heart, specimen for amyloid typing ([id number]; 10/26/2022): Involved by  amyloidosis, ATTR (transthyretin)-type.     A Congo red stain was performed on paraffin sections of the specimen. Congo  red-positive amyloid deposits are present.       Addendum Comment  Note: LC MS/MS did not detect an amino acid sequence abnormality in the  transthyretin protein. This finding if most consistent with age-related  amyloidosis. However, mass spectrometry cannot detect all transthyretin  mutations.  Findings relayed to Dr. Baird via  e-mail on 11/28/2022 at 11:41 a.m.     Electronically Signed Out By WEI LIGHT M.D., M.S./MK  By the  signature on this report, the individual or group listed as making the  Final Interpretation/Diagnosis certifies that they have reviewed this case.  Addendum signed out at [place] [address].   Outside Reference Lab Report  [IMAGE:attachments:[facility]1:1]        Clinical History:  rule out amyloidosis    Specimens Submitted As:  A: ENDOMYOCARDIUM     Gross Description:  Received fresh, labeled with the patient's name and hospital number, are 6 tan,  soft tissue fragments ranging from 0.2 cm to 0.4 cm, and aggregating to 0.8 x  0.4 x 0.1 cm. One piece is submitted in glutaraldehyde for possible Electron  Microscopy. The remainder of the specimen is submitted in one cassette.   LMP    lmp/10/26/2022         The assays/tests were performed with appropriate positive and negative controls  which stained appropriately.    [facility]  Department of Pathology   [address] [place] [address]     Cardiac Catheterization Lab Procedures 26Oct2022 10:20AM Rudy Baird     Test Name Result Flag Reference   Culture, Catheter (Report)     [id number]    [facility], Cath Lab, [address] [place] [address]    Cardiovascular Catheterization Report    Patient Name:   LUIS CARLOS AMIN Performing Physician: [id number] Baird DO  Study Date:    10/26/2022    Verifying Physician:  Michael Barid DO  MRN/PID:     [id number]     Cardiologist/Co-scrub:  Accession/Order#: [id number]    Fellow:  YOB: 1940     Fellow:  Gender:      M        Referring Physician:  RUDY BAIRD  Admit Date:            Referring Physician:  sharda  Surgeon:              Referring Physician:  [id number] Heena Stout MD      Study:      Right Heart Catheterization  Additional Study: Endomyocardial Biopsy      Indications:  LUIS CARLOS AMIN is a 83 year old male who presents  with eval for cardiac amyloidosis. Cardiomyopathy.    Procedure Description:  After infiltration of local anesthetic, the right internal jugular vein was identified with two-dimensional ultrasound. Under direct ultrasound visualization, the right internal jugular vein was cannulated with a micropuncture technique. A 7 Zimbabwean sheath was placed in the vein. A balloon tipped catheter was advanced through the right heart to record pressures. Cardiac output was calculated via the Luigi method. Post-procedure, the venous sheath was pulled and pressure was applied to the site.    Biopsy:  A total of 5 tissue specimens were taken from the right ventricle. No complications were identified during the procedure.    Right Heart Catheterization:  A balloon tipped catheter was advanced through the right heart to record pressures. Cardiac output was calculated via the Luigi method. Elevated left sided filling pressures with normal cardiac output. Cardiac output is normal.      Hemodynamic Pressures:  Post-EMBx RAP: 10.  +---------+-------+-------+-------+--------+-------+  \F\     \F\RA mmHg\F\RV mmHg\F\PA mmHg\F\PCW mmHg\F\FA mmHg\F\  +---------+-------+-------+-------+--------+-------+  \F\Systolic \F\    \F\35   \F\35   \F\    \F\118  \F\  +---------+-------+-------+-------+--------+-------+  \F\Diastolic\F\    \F\    \F\18   \F\    \F\61   \F\  +---------+-------+-------+-------+--------+-------+  \F\EDP   \F\    \F\10   \F\    \F\    \F\    \F\  +---------+-------+-------+-------+--------+-------+  \F\Mean   \F\10   \F\    \F\23   \F\17   \F\80   \F\  +---------+-------+-------+-------+--------+-------+        Oxygen Saturation %:  +---+---+  \F\RPA\F\53%\F\  +---+---+      Cardiac Outputs:  +----+---+---+  \F\  \F\CI \F\CO \F\  +----+---+---+  \F\LUIGI\F\2.3\F\4.5\F\  +----+---+---+      Vascular Resistance Calculated Values (Wood Units):  +---+----------+  \F\SVR\F\1244 dynes\F\  +---+----------+  \F\PVR\F\1.5 ROYAL   \F\  +---+----------+      Complications:  No in-lab complications observed.    Cardiac Cath Transition of Care Summary:  Post Procedure      Eval for cardiac amyloidosis.  Diagnosis:  Blood Loss:       Estimated blood loss during the procedure was < 10 cc  mls.  Specimens Removed:    Number of specimen(s) removed: 5.    ____________________________________________________________________________________  CONCLUSIONS:  1. RA 10, PA 35/18/(23), PCW 17, sat 53%, CO/CI 4.5/2.3, SVR 1244 dynes, PVR 1.5 ROYAL.  2. Preserved CO/CO with elevated filling pressures.  3. EMBx x5 sent to pathology to evaluate for cardiac amyloidosis.  4. Follow up with Marcela Stout and Alecia at /[facility].    ____________________________________________________________________________________  CPT Codes:  Right Heart Cath O2/Cardiac output with biopsy (RHC)49100-20948; Moderate Sedation Services initial 15 minutes patient >5 years-36207; Moderate Sedation Services 1st additional 15 minutes patient >5 years-54824    ICD 10 Codes:  I42.5-Other restrictive cardiomyopathy    [id number] Rudy Baird DO  Performing Physician  Electronically signed by [id number] Rudy Baird DO on 10/26/2022 at 5:08:52 PM      cc Report to: RUDY BAIRD    cc Report to: x    cc Report to: [id number] Heena Stout MD             Provider Impressions    83 year old retired  who presents for advanced heart failure care. He has a past medical history significant for HFimprovedEF and on TTE 6/2022 LVEF~50-55%. He was previously maintained on full HF GDMT but has had recent de-escalation with the cessation of ARNi and beta-blockade due to symptomatic hypotension.  His other comorbidities include CAD status post CABG, PCI. On left heart cath 6/2022 he did have ISR of the RCA and has been recommended for medical management. He is known to have atrial fibrillation maintained on DOAC and per his report is not pending an ablation procedure.  PYP  scan 1/2022 was suggestive of ATTR amyloidosis, cardiac MRI 10/2022 was not consistent with amyloid deposit but did demonstrate left ventricular thrombus, and LVEF 51%. On endomyocardial biopsy 10/2022, ATTR amyloidosis detected and he has been initiated on tafamidiis with marked symptomatic improvement  Between visits, Mr. 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  He required PRBC transfusion.  He is consequently not maintained on systemic anticoagulation.  He continues to struggle with volume overload and recently received IV furosemide after being seen in the nurse practitioner heart failure clinic.  He has been fully adherent with tafamidis.  There have been discussions with regards to hospice/palliative care with his other cardiology clinicians.  Renal function 11/2023: BUN/SCr 47/1.9   K 4.5    GFR 34    NYHA Functional Class: 3  ACC/AHA Stage C heart failure  Volume status: Massively hypervolemic perfusion status: Warm to touch  Aetiology: Ischemic/cardiac amyloidosis  Renal function, 10/2022: BUN/SCr 29/1.2 K 4.5 GFR 59      PLAN:    #ATTR cardiac amyloidosis  Continue tafamidis  Worsening hypervolemia despite escalated on intravenous diuretics and continued oral diuresis.  I am concerned that he has had progression of his amyloid cardiomyopathy, despite tafamidis use.  We discussed:    1.  The possibility of doing a right heart catheterization and seeing if he would be appropriate for inotropic therapies.  He and his family do not wish to pursue this currently, but will continue to discuss together and notify the medical team if he wishes to proceed with this    2.  The need for decongestion.  He will be prescribed for subcutaneous furosemide, first dose tomorrow and will use this daily once a week (80 mg IV) at home.  He will then use subcutaneous furosemide 80 mg once weekly thereafter and remain in close contact with the [facility] IV  diuretic team/nurse practitioner service.  He will have RFP after he completes subcutaneous furosemide for 1 week.    3.  We did touch on the possibility of hospice/palliation.  Mr. 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 There may be grammatical, punctuation, or verbiage errors that can occur with voice recognition programs.

## 2023-11-09 NOTE — PROGRESS NOTES
Subjective   Patient ID: Michael Duran is a 83 y.o. male who presents for follow-up of congestive heart failure.     Current Outpatient Medications:     amiodarone (Pacerone) 200 mg tablet, Take 1 tablet (200 mg) by mouth once daily., Disp: , Rfl:     diphenoxylate-atropine (Lomotil) 2.5-0.025 mg tablet, Take 2 tablets by mouth 4 times a day as needed for diarrhea., Disp: , Rfl:     empagliflozin (Jardiance) 25 mg, Take 1 tablet (25 mg) by mouth once daily., Disp: , Rfl:     eplerenone (Inspra) 25 mg tablet, Take 1 tablet (25 mg) by mouth once daily., Disp: , Rfl:     furosemide (Furoscix) 80 mg/10 mL kit, Inject 80 mg under the skin once daily. For one week. Then once weekly after that, Disp: 12 kit, Rfl: 0    levothyroxine (Synthroid, Levoxyl) 25 mcg tablet, Take 1 tablet (25 mcg) by mouth once daily., Disp: , Rfl:     pantoprazole (ProtoNix) 40 mg EC tablet, Take 1 tablet (40 mg) by mouth once daily. Do not crush, chew, or split., Disp: 30 tablet, Rfl: 0    potassium chloride CR 10 mEq ER tablet, 1 tablet (10 mEq) once daily., Disp: , Rfl:     rosuvastatin (Crestor) 5 mg tablet, Take 1 tablet (5 mg) by mouth once daily., Disp: 30 tablet, Rfl: 11    sucralfate (Carafate) 100 mg/mL suspension, Take 10 mL (1 g) by mouth 4 times a day before meals for 55 doses., Disp: 550 mL, Rfl: 0    tafamidis (Vyndamax) 61 mg capsule, Take 1 capsule (61 mg) by mouth once daily., Disp: , Rfl:     tamsulosin (Flomax) 0.4 mg 24 hr capsule, Take 1 capsule (0.4 mg) by mouth once daily., Disp: , Rfl:     torsemide (Demadex) 100 mg tablet, Take 1 tablet (100 mg) by mouth once daily., Disp: 30 tablet, Rfl: 11    traMADol (Ultram) 50 mg tablet, Take 1 tablet (50 mg) by mouth once daily as needed., Disp: , Rfl:      HPI   Current symptoms include: Weight is unchanged.  He continues to have significatn LE sulma and scrotal edema, orthopnea, JVD.    He denies febrile episodes or cough.  Denies chest pain or palpitations or near or doug  syncope. . He reports taht he has had no further bleeding from the ostomy.   He states he is compliant all of the time with his medications. He states he is compliant most of the time with his diet.    Review of Systems   Constitutional:  Negative for activity change, chills and fever.   HENT:  Negative for hearing loss.    Eyes: Negative.    Respiratory:  Positive for shortness of breath. Negative for cough, chest tightness and wheezing.         Positive sob and orthopnea.    Cardiovascular:  Positive for leg swelling. Negative for chest pain and palpitations.        LE edema extending in to the groin.    Gastrointestinal:  Negative for abdominal distention and blood in stool.   Genitourinary:  Negative for hematuria.   Neurological:  Negative for syncope, weakness and light-headedness.   Psychiatric/Behavioral:  Negative for confusion.        Objective     /63   Pulse 72   Resp 18   Wt 87.7 kg (193 lb 6.4 oz)   SpO2 98%   BMI 29.41 kg/m²     April 2023 Echocardiogram CONCLUSIONS:  1. Left ventricular systolic function is mildly decreased with a 45% estimated ejection fraction.  2. Spectral Doppler shows a restrictive pattern of left ventricular diastolic filling.  3. There is severe concentric left ventricular hypertrophy.  4. There is low normal right ventricular systolic function.  5. The left atrium is severely dilated.  6. The right atrium is moderately to severely dilated.  7. Mild to moderate mitral valve regurgitation.  8. Moderate tricuspid regurgitation.  9. Moderately elevated right ventricular systolic pressure.  10. There is global hypokinesis of the left ventricle with minor regional variations.          Lab Results   Component Value Date    BUN 47 (H) 11/03/2023    CREATININE 1.95 (H) 11/03/2023     (H) 10/26/2023    MG 2.03 11/02/2023    K 4.5 11/03/2023     (L) 11/03/2023       Physical Exam  Constitutional:       Appearance: He is not toxic-appearing.   HENT:      Head:  Normocephalic.      Nose: Nose normal.   Eyes:      Conjunctiva/sclera: Conjunctivae normal.   Neck:      Comments: JVD to the angle of the jaw.   Cardiovascular:      Rate and Rhythm: Normal rate and regular rhythm.      Pulses: Normal pulses.      Heart sounds: No murmur heard.  Pulmonary:      Effort: Pulmonary effort is normal. No respiratory distress.      Breath sounds: No wheezing, rhonchi or rales.      Comments: SOB in reclining position.   Abdominal:      General: Bowel sounds are normal.      Palpations: Abdomen is soft.   Musculoskeletal:         General: Swelling present.      Comments: Pitting edema extending into the groin and scrotum,    Skin:     General: Skin is warm and dry.   Neurological:      General: No focal deficit present.      Mental Status: He is alert and oriented to person, place, and time.   Psychiatric:         Mood and Affect: Mood normal.         Assessment/Plan     Problem List Items Addressed This Visit    None       Chronic  systolic/diastolic heart failure   Etiology:  Amyloidosis   AHA Stage: D  NYHA class: 4   Volume Status:  significant fluid congestion continues.   GFR: 34    GDMT:  BB- hold  ( on amiodarone)   ARB/ACEI/ARNI -   MRA -  Eplerenone   SGLT2i - Jardiance  Diuretic -  Torsemide   Device Therapy:  PPM     CHF: Persistent congestion.  Dr. Stout's note reviewed.  Will continue Torsemide.   Will have lab work ordered yesterday drawn today.  Will give IV Lasix and metolazone today.   He will have sub Q furosemide as soon as it is available.  Follow up on Monday next week.     Patient is having stage 4 sx and is investigating possibility for hospice if not significant improvement with therapy.     2. Chronic blood loss anemia:  Denies further bleeding from colostomy on current medications.     3.  PAF:  RRR. No OAC due to ongoing bleeding.         Addendum:  Patient had had significant rise in BUN/CR 72/2.80.   Will hold the eplerenone and Jardience for now.   Will  hold off on IV Lasix today.  Continue the Torsemide  50 mg twice a day.   Kelli Santiago, APRN-CNP     Furoscix  started today 11/10/23.

## 2023-11-09 NOTE — TELEPHONE ENCOUNTER
----- Message from Bev Alston MD sent at 11/9/2023  9:28 AM EST -----  Results reviewed. RN to call patient. No critical results, follow up as usual to discuss in details.

## 2023-11-09 NOTE — PATIENT INSTRUCTIONS
Thank you for coming in today.  If you have any questions you may contact the office Monday through Friday at [phone] or on week ends at [phone].     Please hold the Eplerenone.  Please hold the Jardiance.       Please take the Torsemide 100 mg 1/2 tablet twice a day.     Repeat lab work  in 2 days.     Please let us know when you receive the Lasix sub Q so that we can apply this and teach you how to use it.     Follow up on Monday.

## 2023-11-09 NOTE — RESULT ENCOUNTER NOTE
Results reviewed. RN to call patient. No critical results, follow up as usual to discuss in details.

## 2023-12-07 NOTE — ANESTHESIA POSTPROCEDURE EVALUATION
Patient: Michael Duran    Procedure Summary       Date: 12/07/23 Room / Location: 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    Anesthesia Post Evaluation    Patient location during evaluation: PACU  Patient participation: complete - patient participated  Level of consciousness: awake  Pain score: 2  Pain management: adequate  Airway patency: patent  Cardiovascular status: acceptable  Respiratory status: acceptable  Hydration status: acceptable  Postoperative Nausea and Vomiting: none        There were no known notable events for this encounter.

## 2023-12-07 NOTE — H&P
History Of Present Illness    Michael Duran is a 83 y.o. male with a significant past medical history of CKD, colon cancer s/p total colectomy, anemia, CAD, CHF, A-fib, COPD, and HTN who presents for EGD to treat upper GI bleeding.     He has had longstanding issues with upper GI bleeding and has undergone multiple upper endoscopies with intervention including injection/clip placement for Dieulafoy lesion, variceal banding, and APC treatment of GAVE.      Past Medical History  Past Medical History:   Diagnosis Date    Anemia     Arrhythmia     Atherosclerosis of coronary artery bypass graft(s) without angina pectoris 07/15/2016    Atherosclerosis of coronary artery bypass graft(s) without angina pectoris    Atherosclerosis of coronary artery bypass graft(s), unspecified, with other forms of angina pectoris (CMS/[facility]) 04/22/2019    Coronary artery disease of bypass graft of native heart with stable angina pectoris    Benign prostatic hyperplasia without lower urinary tract symptoms     BPH (benign prostatic hyperplasia)    CHF (congestive heart failure) (CMS/[facility])     Hyperlipidemia     Hypertension     Other chest pain 04/01/2016    Chest discomfort    Other chest pain 09/17/2019    Atypical chest pain    Other chest pain 05/11/2018    Atypical chest pain    Peripheral vascular disease (CMS/[facility])     Personal history of other diseases of the circulatory system 04/01/2016    History of angina pectoris    Personal history of other diseases of the digestive system     History of ulcerative colitis    Personal history of other diseases of the musculoskeletal system and connective tissue     History of osteoarthritis    Personal history of other diseases of the musculoskeletal system and connective tissue 12/07/2018    History of chronic back pain    Personal history of other diseases of urinary system 06/21/2019    History of hematuria    Personal history of other specified conditions 04/01/2016    History of chest pain     Personal history of other specified conditions 07/15/2016    History of precordial chest pain    Sleep apnea        Surgical History  Past Surgical History:   Procedure Laterality Date    ADENOIDECTOMY  04/01/2016    Adenoidectomy    CATARACT EXTRACTION  04/01/2016    Cataract Surgery    CORONARY ARTERY BYPASS GRAFT  04/01/2016    CABG    CT GUIDED PERCUTANEOUS PERITONEAL OR RETROPERITONEAL FLUID COLLECTION DRAINAGE  10/08/2018    CT GUIDED PERCUTANEOUS PERITONEAL OR RETROPERITONEAL FLUID COLLECTION DRAINAGE 10/8/2018 [facility] CLINICAL LEGACY    ILEOSTOMY  11/19/2022    Ileostomy    KNEE ARTHROPLASTY      OTHER SURGICAL HISTORY  04/01/2016    Cath Atherectomy 3 Left Internal Mammary Graft    OTHER SURGICAL HISTORY  12/09/2021    Pacemaker insertion    OTHER SURGICAL HISTORY  12/09/2021    Cardioversion    OTHER SURGICAL HISTORY  10/23/2018    Total Abdominal Colectomy    OTHER SURGICAL HISTORY  10/22/2018    Complete Proctectomy Combined APR W/ Colostomy Laparoscopic    ROTATOR CUFF REPAIR  04/01/2016    Rotator Cuff Repair    TONSILLECTOMY  04/01/2016    Tonsillectomy    TOTAL KNEE ARTHROPLASTY  04/01/2016    Knee Replacement    US GUIDED ABSCESS DRAIN  10/01/2018    US GUIDED ABSCESS DRAIN 10/1/2018 [facility] CLINICAL LEGACY    US GUIDED PERCUTANEOUS PERITONEAL OR RETROPERITONEAL FLUID COLLECTION DRAINAGE  10/01/2018    US GUIDED PERCUTANEOUS PERITONEAL OR RETROPERITONEAL FLUID COLLECTION DRAINAGE 10/1/2018 [facility] CLINICAL LEGACY        Social History  He reports that he has quit smoking. His smoking use included cigarettes. He has never used smokeless tobacco. He reports that he does not currently use alcohol. He reports that he does not use drugs.    Family History  Family History   Problem Relation Name Age of Onset    Other (Cardiac disorder) Other Sibling         Allergies  Ace inhibitors, Eptifibatide, Ezetimibe, Ezetimibe-simvastatin, Metoprolol, Pravastatin, and Rosuvastatin    Review of Systems     Physical  "Exam  Constitutional:       Appearance: Normal appearance.   HENT:      Mouth/Throat:      Mouth: Mucous membranes are moist.   Eyes:      Extraocular Movements: Extraocular movements intact.   Cardiovascular:      Rate and Rhythm: Normal rate and regular rhythm.      Heart sounds: Normal heart sounds.   Pulmonary:      Effort: Pulmonary effort is normal. No respiratory distress.      Breath sounds: Normal breath sounds. No wheezing.   Abdominal:      General: Abdomen is flat. Bowel sounds are normal. There is no distension.      Palpations: Abdomen is soft.      Tenderness: There is no abdominal tenderness. There is no rebound.   Musculoskeletal:         General: Normal range of motion.   Skin:     General: Skin is warm and dry.   Neurological:      General: No focal deficit present.      Mental Status: He is alert and oriented to person, place, and time. Mental status is at baseline.   Psychiatric:         Mood and Affect: Mood normal.         Behavior: Behavior normal.          Last Recorded Vitals  Blood pressure 120/67, pulse 74, temperature 36.2 °C (97.2 °F), temperature source Temporal, resp. rate 12, height 1.702 m (5' 7\"), weight 81.6 kg (180 lb), SpO2 100 %.    Relevant Results      Current Outpatient Medications   Medication Instructions    amiodarone (PACERONE) 200 mg, oral, Daily    diphenoxylate-atropine (Lomotil) 2.5-0.025 mg tablet 2 tablets, oral, 4 times daily PRN    empagliflozin (Jardiance) 25 mg 1 tablet, oral, Daily    eplerenone (INSPRA) 25 mg, oral, Daily    Furoscix 80 mg, subcutaneous, Daily, For one week. Then once weekly after that    levothyroxine (SYNTHROID, LEVOXYL) 25 mcg, oral, Daily    pantoprazole (PROTONIX) 40 mg, oral, Daily, Do not crush, chew, or split.    potassium chloride CR 10 mEq ER tablet 1 tablet (10 mEq) once daily.    rosuvastatin (CRESTOR) 5 mg, oral, Daily    tafamidis (Vyndamax) 61 mg capsule 1 capsule, oral, Daily    tamsulosin (FLOMAX) 0.4 mg, oral, Daily    " torsemide (DEMADEX) 50 mg, oral, 2 times daily    traMADol (ULTRAM) 50 mg, oral, Daily PRN    Vyndamax 61 mg, oral, Daily          Assessment/Plan   Active Problems:  There are no active Hospital Problems.    EGD for  treatment of GAVR         Jassi Herbert DO

## 2023-12-08 NOTE — TELEPHONE ENCOUNTER
----- Message from Lakisha Sweeney MA sent at 12/8/2023 10:19 AM EST -----  SCHEDULED FOR 1.8.24 STILL HAS PREVIOUS PACKET FROM LAST PROCEDURE.  ----- Message -----  From: Lakisha Sweeney MA  Sent: 12/7/2023   1:31 PM EST  To:  [id number]    PATIENT SCOPED 12.7.23. WILL OFFER 1.8.24  ----- Message -----  From: Jassi Herbert DO  Sent: 12/7/2023   1:12 PM EST  To: Lakisha Sweeney MA    Can you add this patient on for a repeat EGD in the OR at the beginning of January.

## 2023-12-12 NOTE — TELEPHONE ENCOUNTER
Patient's Family called and asked about lab work. Kelli Santiago has reviewed Michael's BMP and MG. Kelli Recommends that Santana complete his home Furoscix 80mg sub Q injection today and tomorrow. Patient will see CHF clinic again on Thursday. Patient is reminded to follow his sodium and fluid restrictions. Patient is also reminded that he was taken off of Potassium and Magnesium supplements at this last hospitalization.

## 2023-12-12 NOTE — PATIENT INSTRUCTIONS
Thank you for coming in today.  If you have any questions you may contact the office Monday through Friday at [phone] or on week ends at [phone].     Please have blood work prior to leaving today.     Continue current medications.     I will call you concerning the Furoscix    Please follow  a 2 GM sodium diet and limit fluid intake to 2 liters per day or 8 servings ( serving size = 8 oz. = 1 cup = 240 ml) per day.   Please avoid processed meat products (luncheon meats, sausages, andino, hot dogs for example) eat 4 servings of vegetables and 1-2 whole servings of whole fruits per day.   Please weigh daily and call [phone] for weight gain of 3 pounds in 24 hours or 5 pounds or if you experience increased swelling or shortness of breath.     Follow up on Thursday

## 2023-12-12 NOTE — PROGRESS NOTES
Subjective   Patient ID: Michael Duran is a 83 y.o. male who presents for follow-up of congestive heart failure.     Current Outpatient Medications:     amiodarone (Pacerone) 200 mg tablet, Take 1 tablet (200 mg) by mouth once daily., Disp: , Rfl:     diphenoxylate-atropine (Lomotil) 2.5-0.025 mg tablet, Take 2 tablets by mouth 4 times a day as needed for diarrhea., Disp: , Rfl:     eplerenone (Inspra) 25 mg tablet, Take 2 tablets (50 mg) by mouth once daily., Disp: , Rfl:     levothyroxine (Synthroid, Levoxyl) 25 mcg tablet, Take 1 tablet (25 mcg) by mouth once daily., Disp: , Rfl:     molnupiravir (Lagevrio, EUA,) 200 mg capsule, every 12 hours., Disp: , Rfl:     pantoprazole (ProtoNix) 40 mg EC tablet, Take 1 tablet (40 mg) by mouth once daily. Do not crush, chew, or split., Disp: 30 tablet, Rfl: 0    rosuvastatin (Crestor) 5 mg tablet, Take 1 tablet (5 mg) by mouth once daily., Disp: 30 tablet, Rfl: 11    sucralfate (Carafate) 1 gram tablet, Take 1 tablet (1 g) by mouth 4 times a day for 14 days. Take 1 hour before meals and at bedtime, Disp: 56 tablet, Rfl: 0    tafamidis (Vyndamax) 61 mg capsule, Take 1 capsule (61 mg) by mouth once daily., Disp: 90 capsule, Rfl: 3    tamsulosin (Flomax) 0.4 mg 24 hr capsule, Take 1 capsule (0.4 mg) by mouth once daily., Disp: , Rfl:     torsemide (Demadex) 100 mg tablet, Take 0.5 tablets (50 mg) by mouth 2 times a day. (Patient taking differently: Take 1 tablet (100 mg) by mouth once daily.), Disp: 30 tablet, Rfl: 1    traMADol (Ultram) 50 mg tablet, Take 1 tablet (50 mg) by mouth once daily as needed., Disp: , Rfl:     empagliflozin (Jardiance) 25 mg, Take 1 tablet (25 mg) by mouth once daily., Disp: , Rfl:     furosemide (Furoscix) 80 mg/10 mL kit, Inject 80 mg under the skin once daily. For one week. Then once weekly after that, Disp: 12 kit, Rfl: 0    potassium chloride CR 10 mEq ER tablet, 1 tablet (10 mEq) once daily., Disp: , Rfl:      HPI   Patient has past medical  history of CAD with remote CABG.  He had PCI to the circumflex in 2014.  He has a permanent pacemaker in place.  He has stage III renal dysfunction.  He has a history of colon cancer.  He has a colostomy.  He has had persistent GI bleeding despite multiple attempts to cauterize the bleeding areas.  He has chronic iron deficiency anemia secondary to the ongoing bleeding issues.  He has diastolic heart failure which is aggravated by the anemia.  Most recent admission was at [facility] in November he was discharged on 11/23 at a weight of 163 pounds.  During admission he was continuously on the Lasix drip with intermittent doses of IV diuril.  He presents today for follow-up in heart failure clinic citing worsening edema and shortness of breath.  He has gained about 23 pounds in the last 3 weeks.  He and his sister note that he is following sodium and fluid restrictions closely.  We have had several discussions concerning palliative care, which is truly what we are  in essence doing now,  versus transitioning to hospice.  Patient has not felt that he is ready for hospice despite the persistent congestion and multiple readmissions for acute on chronic congestion.  Prognosis is poor.    Review of Systems   Constitutional:  Negative for activity change, chills and fever.   HENT:  Negative for hearing loss.    Eyes: Negative.    Respiratory:  Positive for shortness of breath. Negative for cough, chest tightness and wheezing.    Cardiovascular:  Positive for leg swelling. Negative for chest pain and palpitations.   Gastrointestinal:  Negative for abdominal distention and blood in stool.   Genitourinary:  Negative for hematuria.   Neurological:  Negative for syncope, weakness and light-headedness.   Psychiatric/Behavioral:  Negative for confusion.        Objective     /69 (BP Location: Left arm, Patient Position: Sitting, BP Cuff Size: Adult)   Pulse 74   Resp 20   Wt 84.6 kg (186 lb 9.6 oz)   SpO2 94%   BMI  29.23 kg/m²     April 2023 Echocardiogram CONCLUSIONS:  1. Left ventricular systolic function is mildly decreased with a 45% estimated ejection fraction.  2. Spectral Doppler shows a restrictive pattern of left ventricular diastolic filling.  3. There is severe concentric left ventricular hypertrophy.  4. There is low normal right ventricular systolic function.  5. The left atrium is severely dilated.  6. The right atrium is moderately to severely dilated.  7. Mild to moderate mitral valve regurgitation.  8. Moderate tricuspid regurgitation.  9. Moderately elevated right ventricular systolic pressure.  10. There is global hypokinesis of the left ventricle with minor regional variations.       Lab Results   Component Value Date    BUN 72 (H) 11/09/2023    CREATININE 2.80 (H) 11/09/2023     (H) 11/09/2023    MG 2.03 11/02/2023    K 3.5 11/09/2023     (L) 11/09/2023       Constitutional:       General: NAD  HENT:   Normocephalic.  No other gross abnormality.   No JVD or hepatojugular reflex.  Cardiovascular:      Rate and Rhythm: Normal rate and regular rhythm.      Heart sounds: S1, S2 normal, no murmurs, no S3 or S4    Pulmonary:      Effort: Pulmonary effort is normal.      Breath sounds:  Normal respiratory excursion. No wheezes or rales  Abdominal:      General: Abdomen is softly distended. Bowel sounds are normal.      Palpations: Abdomen is soft.   Musculoskeletal:         General:  BACK well. No swelling.   Skin:     General: Skin is warm and dry.      Assessment/Plan     Problem List Items Addressed This Visit          Cardiac and Vasculature    Paroxysmal atrial fibrillation (CMS/HCC)    Chronic combined systolic and diastolic heart failure (CMS/HCC)    Atherosclerotic heart disease of native coronary artery without angina pectoris - Primary    Essential hypertension       Genitourinary and Reproductive    Stage 3 chronic kidney disease (CMS/HCC)     Chronic  systolic/diastolic heart failure    Etiology:  Amyloidosis   AHA Stage: D  NYHA class: 4   Volume Status:  He has gained 23# in the past three weeks.   Discharge weight from #  GFR:     GDMT:  BB- hold  ( on amiodarone)   ARB/ACEI/ARNI -   MRA -  Eplerenone   SGLT2i -   Diuretic -  Torsemide   Device Therapy:  PPM      CHF:  He has had steady weight gain since leaving [facility] on 11/23/23.   He is a total of 23 pounds up from his discharge weight.  He continues to have significant edema of legs and genital area and JVD with crackles bilateral bases today.  He is taking medications as directed.  Last Cr. 2.43.  Will check BMP and magnesium today.  If stable will continue the Torsemide and he can use the Furoscix 80 mg today and again on Wednesday.  Follow up on Thursday.       Patient labs reviewed.  Will make certain he has stopped the potassium supplement.  He will use the Furoscix 80 mg today and tomorrow in addition to the usually scheduled oral diuretic therapy.  Follow up on Thursday.  If this is not effective can consider bringing him into infusion clinic several day per week for IV Lasix drip and diuril as OP.   Will continue dialogue concerning Hospice and Palliative care.     2. Chronic blood loss anemia:  Following with GI.  He has active bleeding which has not been able to be completely cauterized. Still have some bleeding from colostomy on current medications. Hb 7.3 on 12/7/23.     3.  PAF:  RRR. (Paced) No OAC due to ongoing bleeding.

## 2023-12-14 NOTE — PROGRESS NOTES
Subjective   Patient ID: Michael Duran is a 83 y.o. male who presents for follow-up of congestive heart failure.     Current Outpatient Medications:     amiodarone (Pacerone) 200 mg tablet, Take 1 tablet (200 mg) by mouth once daily., Disp: , Rfl:     diphenoxylate-atropine (Lomotil) 2.5-0.025 mg tablet, Take 2 tablets by mouth 4 times a day as needed for diarrhea., Disp: , Rfl:     eplerenone (Inspra) 25 mg tablet, Take 2 tablets (50 mg) by mouth once daily., Disp: , Rfl:     furosemide (Furoscix) 80 mg/10 mL kit, Inject 80 mg under the skin once daily. For one week. Then once weekly after that, Disp: 12 kit, Rfl: 0    levothyroxine (Synthroid, Levoxyl) 25 mcg tablet, Take 1 tablet (25 mcg) by mouth once daily., Disp: , Rfl:     molnupiravir (Lagevrio, EUA,) 200 mg capsule, every 12 hours., Disp: , Rfl:     rosuvastatin (Crestor) 5 mg tablet, Take 1 tablet (5 mg) by mouth once daily., Disp: 30 tablet, Rfl: 11    sucralfate (Carafate) 1 gram tablet, Take 1 tablet (1 g) by mouth 4 times a day for 14 days. Take 1 hour before meals and at bedtime, Disp: 56 tablet, Rfl: 0    tafamidis (Vyndamax) 61 mg capsule, Take 1 capsule (61 mg) by mouth once daily., Disp: 90 capsule, Rfl: 3    tamsulosin (Flomax) 0.4 mg 24 hr capsule, Take 1 capsule (0.4 mg) by mouth once daily., Disp: , Rfl:     torsemide (Demadex) 100 mg tablet, Take 0.5 tablets (50 mg) by mouth 2 times a day. (Patient taking differently: Take 1 tablet (100 mg) by mouth once daily.), Disp: 30 tablet, Rfl: 1    traMADol (Ultram) 50 mg tablet, Take 1 tablet (50 mg) by mouth once daily as needed., Disp: , Rfl:     pantoprazole (ProtoNix) 40 mg EC tablet, Take 1 tablet (40 mg) by mouth once daily. Do not crush, chew, or split., Disp: 30 tablet, Rfl: 0     HPI   Patient has past medical history of CAD with remote CABG.  He had PCI to the circumflex in 2014.  He has a permanent pacemaker in place.  He has stage III renal dysfunction.  He has a history of colon  cancer.  He has a colostomy.  He has had persistent GI bleeding despite multiple attempts to cauterize the bleeding areas.  He has chronic iron deficiency anemia secondary to the ongoing bleeding issues.  He has diastolic heart failure which is aggravated by the anemia.  Most recent admission was at [facility] in November he was discharged on 11/23 at a weight of 163 pounds.  During admission he was continuously on the Lasix drip with intermittent doses of IV diuril.     He is following sodium and fluid restriction he is taking medications as directed.  His weight is actually up a pound today.  He has not had any improvement in his overall symptoms.  We discussed prognosis gnosis and lack of benefit with the Furoscix and current medicine regimen.  He has decided today after discussion that he does not want heroic efforts in the event that he should arrest.      Review of Systems   Constitutional:  Positive for fatigue. Negative for activity change, chills and fever.   HENT:  Negative for hearing loss.    Eyes: Negative.    Respiratory:  Positive for cough and shortness of breath. Negative for chest tightness and wheezing.    Cardiovascular:  Positive for leg swelling. Negative for chest pain and palpitations.   Gastrointestinal:  Negative for abdominal distention and blood in stool.   Genitourinary:  Positive for scrotal swelling. Negative for hematuria.   Neurological:  Negative for syncope, weakness and light-headedness.   Psychiatric/Behavioral:  Negative for confusion.        Objective     /61 (BP Location: Left arm, Patient Position: Sitting, BP Cuff Size: Adult)   Pulse 74   Resp 20   Wt 86.5 kg (190 lb 12.8 oz)   SpO2 96%   BMI 29.88 kg/m²       No echocardiogram results found for the past 12 months     Lab Results   Component Value Date    BUN 81 (H) 12/12/2023    CREATININE 2.35 (H) 12/12/2023     (H) 11/09/2023    MG 2.57 (H) 12/12/2023    K 5.3 12/12/2023     (L) 12/12/2023        Constitutional:       General: NAD  HENT:   Normocephalic.  No other gross abnormality.   No JVD or hepatojugular reflex.  Cardiovascular:      Rate and Rhythm: Normal rate and regular rhythm.      Heart sounds: S1, S2 normal, no murmurs, no S3 or S4    Pulmonary:      Effort: Pulmonary effort is normal.      Breath sounds:  Normal respiratory excursion. No wheezes or rales  Abdominal:      General: Abdomen is softly distended. Bowel sounds are normal.      Palpations: Abdomen is soft.   Musculoskeletal:         General:  BACK well. No swelling.   Skin:     General: Skin is warm and dry.      Assessment/Plan     Problem List Items Addressed This Visit          Cardiac and Vasculature    Chronic combined systolic and diastolic heart failure (CMS/HCC) - Primary    Relevant Orders    Basic Metabolic Panel    Magnesium    Cardiac amyloidosis (CMS/HCC)       Chronic  systolic/diastolic heart failure   Etiology:  Amyloidosis   AHA Stage: D  NYHA class: 4   Volume Status:  He has gained 23# in the past three weeks.   Discharge weight from #  GFR: 27     GDMT:  BB- hold  ( on amiodarone)   ARB/ACEI/ARNI - on hold due to renal insufficiency   MRA -  Eplerenone   SGLT2i - on hold  Diuretic -  Torsemide   Device Therapy:  PPM      CHF: No change in weight with oral torsemide and Furoscix.  Check BMP/Mag today.  VS are stable.  Will plan for IV Lasix drip in clinic with diuril as palliative measure to attempt to improve congestion.  Monitor renal function.   If this is not successful and he continues to have recurrent admissions recommend consideration of Hospice.  Follow up in clinic on Monday next week.     Reviewed DNR options with patient and sister today.   He has decided on a DNR [facility]- Arrest status.       2. Chronic blood loss anemia:  Following with GI.  He has active bleeding which has not been able to be completely cauterized. Still have some bleeding from colostomy on current medications. Hb 7.3  on 12/7/23.     3.  PAF:  RRR. (Paced) No OAC due to ongoing bleeding.

## 2023-12-16 PROBLEM — K63.9 BOWEL DISEASE: Status: ACTIVE | Noted: 2023-01-01

## 2023-12-16 PROBLEM — K27.9 PEPTIC ULCER WITHOUT HEMORRHAGE, PERFORATION, OR OBSTRUCTION: Status: ACTIVE | Noted: 2023-01-01

## 2023-12-16 PROBLEM — G57.60 PLANTAR NERVE LESION: Status: ACTIVE | Noted: 2023-01-01

## 2023-12-16 PROBLEM — N47.2 PARAPHIMOSIS: Status: ACTIVE | Noted: 2023-01-01

## 2023-12-16 PROBLEM — M19.90 OSTEOARTHRITIS: Status: ACTIVE | Noted: 2023-01-01

## 2023-12-16 PROBLEM — G56.03 BILATERAL CARPAL TUNNEL SYNDROME: Status: ACTIVE | Noted: 2017-07-18

## 2023-12-16 PROBLEM — K74.60 CIRRHOSIS (MULTI): Status: ACTIVE | Noted: 2023-01-01

## 2023-12-18 PROBLEM — I50.43 HEART FAILURE, ACUTE ON CHRONIC, SYSTOLIC AND DIASTOLIC (MULTI): Status: ACTIVE | Noted: 2023-01-01

## 2023-12-18 PROBLEM — I43 CARDIAC AMYLOIDOSIS (MULTI): Chronic | Status: ACTIVE | Noted: 2023-01-01

## 2023-12-18 PROBLEM — Z93.3 COLOSTOMY IN PLACE (MULTI): Chronic | Status: ACTIVE | Noted: 2023-01-01

## 2023-12-18 PROBLEM — E85.4 CARDIAC AMYLOIDOSIS (MULTI): Chronic | Status: ACTIVE | Noted: 2023-01-01

## 2023-12-18 PROBLEM — I95.0 IDIOPATHIC HYPOTENSION: Chronic | Status: ACTIVE | Noted: 2023-01-01

## 2023-12-18 PROBLEM — J44.9 COPD (CHRONIC OBSTRUCTIVE PULMONARY DISEASE) (MULTI): Chronic | Status: ACTIVE | Noted: 2023-01-01

## 2023-12-18 PROBLEM — I48.0 PAROXYSMAL ATRIAL FIBRILLATION (MULTI): Chronic | Status: ACTIVE | Noted: 2023-01-01

## 2023-12-18 PROBLEM — Z95.1 HX OF CABG: Chronic | Status: ACTIVE | Noted: 2023-01-01

## 2023-12-18 PROBLEM — C20 RECTAL CANCER (MULTI): Chronic | Status: ACTIVE | Noted: 2023-01-01

## 2023-12-18 PROBLEM — C18.9: Chronic | Status: ACTIVE | Noted: 2023-01-01

## 2023-12-18 PROBLEM — N50.89 SCROTAL EDEMA: Status: ACTIVE | Noted: 2023-01-01

## 2023-12-18 NOTE — ED PROVIDER NOTES
HPI   Chief Complaint   Patient presents with    Congestive Heart Failure     From CHF clinic    Shortness of Breath       He has leg swelling.  He has a history of CHF but his leg swelling has been increasing.  He is on torsemide at home and has been compliant.  He has a history of CKD and had a creatinine of 3 today which is above his baseline of 2.3-2.6.  He denies being short of breath.  His dry weight is around 160 but he is up to 186.  They tried IV Lasix last week and he did come down 3 pounds but he recently regained this.  He denies having any chest pain.  He followed up with the heart failure clinic today and they sent him in for further evaluation and possible admission.                          Tita Coma Scale Score: 15                  Patient History   Past Medical History:   Diagnosis Date    Anemia     Arrhythmia     Atherosclerosis of coronary artery bypass graft(s) without angina pectoris 07/15/2016    Atherosclerosis of coronary artery bypass graft(s) without angina pectoris    Atherosclerosis of coronary artery bypass graft(s), unspecified, with other forms of angina pectoris (CMS/[facility]) 04/22/2019    Coronary artery disease of bypass graft of native heart with stable angina pectoris    Benign prostatic hyperplasia without lower urinary tract symptoms     BPH (benign prostatic hyperplasia)    CHF (congestive heart failure) (CMS/[facility])     Hyperlipidemia     Hypertension     Other chest pain 04/01/2016    Chest discomfort    Other chest pain 09/17/2019    Atypical chest pain    Other chest pain 05/11/2018    Atypical chest pain    Peripheral vascular disease (CMS/[facility])     Personal history of other diseases of the circulatory system 04/01/2016    History of angina pectoris    Personal history of other diseases of the digestive system     History of ulcerative colitis    Personal history of other diseases of the musculoskeletal system and connective tissue     History of osteoarthritis    Personal  history of other diseases of the musculoskeletal system and connective tissue 12/07/2018    History of chronic back pain    Personal history of other diseases of urinary system 06/21/2019    History of hematuria    Personal history of other specified conditions 04/01/2016    History of chest pain    Personal history of other specified conditions 07/15/2016    History of precordial chest pain    Sleep apnea      Past Surgical History:   Procedure Laterality Date    ADENOIDECTOMY  04/01/2016    Adenoidectomy    CATARACT EXTRACTION  04/01/2016    Cataract Surgery    CORONARY ARTERY BYPASS GRAFT  04/01/2016    CABG    CT GUIDED PERCUTANEOUS PERITONEAL OR RETROPERITONEAL FLUID COLLECTION DRAINAGE  10/08/2018    CT GUIDED PERCUTANEOUS PERITONEAL OR RETROPERITONEAL FLUID COLLECTION DRAINAGE 10/8/2018 [facility] CLINICAL LEGACY    ILEOSTOMY  11/19/2022    Ileostomy    KNEE ARTHROPLASTY      OTHER SURGICAL HISTORY  04/01/2016    Cath Atherectomy 3 Left Internal Mammary Graft    OTHER SURGICAL HISTORY  12/09/2021    Pacemaker insertion    OTHER SURGICAL HISTORY  12/09/2021    Cardioversion    OTHER SURGICAL HISTORY  10/23/2018    Total Abdominal Colectomy    OTHER SURGICAL HISTORY  10/22/2018    Complete Proctectomy Combined APR W/ Colostomy Laparoscopic    ROTATOR CUFF REPAIR  04/01/2016    Rotator Cuff Repair    TONSILLECTOMY  04/01/2016    Tonsillectomy    TOTAL KNEE ARTHROPLASTY  04/01/2016    Knee Replacement    US GUIDED ABSCESS DRAIN  10/01/2018    US GUIDED ABSCESS DRAIN 10/1/2018 [facility] CLINICAL LEGACY    US GUIDED PERCUTANEOUS PERITONEAL OR RETROPERITONEAL FLUID COLLECTION DRAINAGE  10/01/2018    US GUIDED PERCUTANEOUS PERITONEAL OR RETROPERITONEAL FLUID COLLECTION DRAINAGE 10/1/2018 [facility] CLINICAL LEGACY     Family History   Problem Relation Name Age of Onset    Other (Cardiac disorder) Other Sibling      Social History     Tobacco Use    Smoking status: Former     Types: Cigarettes    Smokeless tobacco: Never   Vaping Use     Vaping Use: Never used   Substance Use Topics    Alcohol use: Not Currently    Drug use: Never       Physical Exam   ED Triage Vitals [12/18/23 1006]   Temp Heart Rate Resp BP   36.6 °C (97.8 °F) 79 18 106/62      SpO2 Temp Source Heart Rate Source Patient Position   95 % Temporal Monitor Sitting      BP Location FiO2 (%)     Left arm --       Physical Exam  Vitals and nursing note reviewed.   Constitutional:       Appearance: Normal appearance.   HENT:      Head: Normocephalic and atraumatic.      Mouth/Throat:      Mouth: Mucous membranes are moist.   Eyes:      Extraocular Movements: Extraocular movements intact.      Pupils: Pupils are equal, round, and reactive to light.   Cardiovascular:      Rate and Rhythm: Normal rate and regular rhythm.      Heart sounds: No murmur heard.  Pulmonary:      Effort: Pulmonary effort is normal. No respiratory distress.      Breath sounds: Normal breath sounds.   Abdominal:      General: There is no distension.      Palpations: Abdomen is soft.      Tenderness: There is no abdominal tenderness.      Comments: Colostomy noted in right lower quadrant   Musculoskeletal:         General: No tenderness or deformity. Normal range of motion.      Cervical back: Neck supple.      Right lower leg: Edema present.      Left lower leg: Edema present.   Skin:     General: Skin is warm and dry.      Findings: No lesion or rash.   Neurological:      General: No focal deficit present.      Mental Status: He is alert and oriented to person, place, and time.      Sensory: No sensory deficit.      Motor: No weakness.   Psychiatric:         Behavior: Behavior normal.       Labs Reviewed   TROPONIN SERIES- (INITIAL, 1 HR)    Narrative:     The following orders were created for panel order Troponin I Series, High Sensitivity (0, 1 HR).  Procedure                               Abnormality         Status                     ---------                               -----------         ------                      Troponin I, High Sensiti...[[id number]]                                                 Troponin, High Sensitivi...[[id number]]                                                   Please view results for these tests on the individual orders.   B-TYPE NATRIURETIC PEPTIDE   CBC WITH AUTO DIFFERENTIAL   SERIAL TROPONIN-INITIAL   SERIAL TROPONIN, 1 HOUR     XR chest 1 view    (Results Pending)     ED Course & MDM   Diagnoses as of 12/18/23 1322   Heart failure, acute on chronic, systolic and diastolic (CMS/HCC)       Medical Decision Making  Differentials include CHF, peripheral edema, CKD. Imaging studies, if performed, were independently reviewed and interpreted by myself and confirmed by radiologist. EKG(s), if performed, were interpreted by myself.The patient had an EKG that was paced at 71.  No acute ischemia noted.  QTc calculated at 611, SD interval calculated at 231.  Hemoglobin is 7.8 which is near baseline.  .  Troponin is 143 with a repeat of 136.  Chest x-ray shows pleural fluid and the mid to left lower lobe which is suspicious for pneumonia but however I consider it more to be CHF due to his clinical picture as he is does not have a white blood cell count or cough.  At this time I did discuss with hospitalist Dianne FISHER for admission to the hospital for diuresis.  I did give him 40 mg of IV Lasix here.        Procedure  Procedures     Jose Manuel Ames MD  12/18/23 1320

## 2023-12-18 NOTE — ACP (ADVANCE CARE PLANNING)
Confirmed with patient at the bedside on admission that he would like for his CODE STATUS to be DNR/DNI; believes he does have [place] DNR-CCA paperwork signed at home.  He would be okay with temporary trials of BiPAP and/or CPAP as needed.  However, he does not want resuscitative measures such as CPR or mechanical ventilation.  He is on the fence about advanced levels of care such as ICU at this time.  CODE STATUS updated in the computer.

## 2023-12-18 NOTE — H&P
History Of Present Illness:  Michael Duran is a 83 y.o. 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 Patient reports he is had recurrent bouts of weight gain and required admission multiple times for IV diuresis, noting most recently he has been going to the heart failure clinic and described continued lower extremity edema all the way up into his scrotum. He trialed IV Lasix in the outpatient setting and lost 3 pounds, but did subsequently regain this and more. Believes he has gained a total of 25-30 pounds. He has also noticed some very mild increased shortness of breath on exertion from his baseline; denies chest pain/pressure, dizziness or lightheadedness, diaphoresis, syncope or presyncope, fevers, chills, nausea, vomiting, abdominal pain. Has noticed decreased urine output. He reports compliance with all of his medications and with dietary modifications.    ED Course (Summary):   Vitals on presentation: T97.8, /62, HR 79, RR 18, SpO2 95% RA  Labs: CBC with WBC 4.4, Hgb 7.8, platelets 140.  Chemistry panel with glucose 86, sodium 129 potassium 5.1, BUN 90, serum creatinine 3.00, mag 2.46.  .  High-sensitivity troponin 143-136.  ECG paced, similar in comparison in the system to last few per quick review  Patient was given one-time dose 40 mg IV Lasix    ED Course:  Diagnoses as of 12/18/23 1524   Heart failure, acute on chronic, systolic and diastolic (CMS/HCC)     Relevant Results  Results for orders placed or performed during the hospital encounter of 12/18/23 (from the past 24 hour(s))   B-Type Natriuretic  Peptide   Result Value Ref Range     (H) 0 - 99 pg/mL   CBC and Auto Differential   Result Value Ref Range    WBC 4.4 4.4 - 11.3 x10*3/uL    nRBC 0.0 0.0 - 0.0 /100 WBCs    RBC 2.57 (L) 4.50 - 5.90 x10*6/uL    Hemoglobin 7.8 (L) 13.5 - 17.5 g/dL    Hematocrit 24.9 (L) 41.0 - 52.0 %    MCV 97 80 - 100 fL    MCH 30.4 26.0 - 34.0 pg    MCHC 31.3 (L) 32.0 - 36.0 g/dL    RDW 19.4 (H) 11.5 - 14.5 %    Platelets 140 (L) 150 - 450 x10*3/uL    Neutrophils % 77.5 40.0 - 80.0 %    Immature Granulocytes %, Automated 0.5 0.0 - 0.9 %    Lymphocytes % 11.3 13.0 - 44.0 %    Monocytes % 8.8 2.0 - 10.0 %    Eosinophils % 1.4 0.0 - 6.0 %    Basophils % 0.5 0.0 - 2.0 %    Neutrophils Absolute 3.44 1.60 - 5.50 x10*3/uL    Immature Granulocytes Absolute, Automated 0.02 0.00 - 0.50 x10*3/uL    Lymphocytes Absolute 0.50 (L) 0.80 - 3.00 x10*3/uL    Monocytes Absolute 0.39 0.05 - 0.80 x10*3/uL    Eosinophils Absolute 0.06 0.00 - 0.40 x10*3/uL    Basophils Absolute 0.02 0.00 - 0.10 x10*3/uL   Troponin I, High Sensitivity, Initial   Result Value Ref Range    Troponin I, High Sensitivity 143 (HH) 0 - 20 ng/L   Troponin, High Sensitivity, 1 Hour   Result Value Ref Range    Troponin I, High Sensitivity 136 (HH) 0 - 20 ng/L      XR chest 1 view    Result Date: 12/18/2023  Interpreted By:  Wendi Hu, STUDY: XR CHEST 1 VIEW;  12/18/2023 11:34 am   INDICATION: Signs/Symptoms:sob.   COMPARISON: 11/07/2023   ACCESSION NUMBER(S): [id number]   ORDERING CLINICIAN: JELENA CHAVARRIA   TECHNIQUE: Portable AP upright   FINDINGS: ICD is present in the left upper chest wall with unchanged right atrial and right ventricular electrode wire positions. Sternotomy wires appear unchanged.   Cardiac silhouette appears upper normal to mildly enlarged. Opacity is noted mid and lower left lung, new since the prior study and suspicious for pneumonia. No pleural effusion is noted on the left. Right costophrenic angle is blunted which may indicate minimal right  pleural fluid. Osseous structures show no acute interval change..       Infiltrate mid and lower left lung suspicious for pneumonia   Probable minimal right pleural fluid   MACRO: None.   Signed by: Wendi Hu 12/18/2023 12:06 PM Dictation workstation:   [id number]    EGD    Result Date: 12/7/2023  Table formatting from the original result was not included. Impression The esophagus appeared normal. 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 The duodenal bulb, 1st part of the duodenum and 2nd part of the duodenum appeared normal. Findings The esophagus appeared normal. No Esophageal varices. 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 The duodenal bulb, 1st part of the duodenum and 2nd part of the duodenum appeared normal. Recommendation  Repeat EGD in 1 month  Continue with  Protonix 40mg Bid Carafate 1gm Tid for 14 days  - The signs and symptoms of potential delayed complications were discussed with you and your family. If you have any concerns or develop any signs or symptoms of delayed complications you can call my office at [phone]. If you experience significant abdominal pain or bleeding you should proceed directly to the nearest Emergency Room or call 911. - Written discharge instructions were provided to the patient. - Return to normal activities tomorrow. - Resume previous diet. - If any biopsies were collected or if any polyps were removed, those results will be communicated to you via [employer] or through a letter once final pathology results are available. Pathology results can take up to 7-10 days to come back and will then be reviewed by your physician. - Follow up with your primary care provider. - Continue  your current medications. 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 Preprocedure A history and physical has been performed, and patient medication allergies have been reviewed. The patient's tolerance of previous anesthesia has been reviewed. The risks and benefits of the procedure and the sedation options and risks were discussed with the patient. All questions were answered and informed consent obtained. Details of the Procedure The patient underwent monitored anesthesia care, which was administered by an anesthesia professional. The patient's blood pressure, ECG, ETCO2, heart rate, level of consciousness, oxygen and respirations were monitored throughout the procedure. The scope was introduced through the mouth and advanced to the second part of the duodenum. Retroflexion was performed in the cardia. The patient's estimated blood loss was minimal (<5 mL). The procedure was not difficult. The patient tolerated the procedure well. There were no apparent adverse events. Prior to the procedure, a History and Physical was performed, and patient medications and allergies were reviewed. Immediately prior to the administration of medications, the patient was re-assessed for adequacy to receive sedatives. The heart rate, respiratory rate, oxygen saturations, blood pressure, adequacy of pulmonary ventilation, and response to care were monitored throughout the procedure. The physical status of the patient was re-assessed after the procedure. The risks and benefits of the procedure and the sedation options and risks were discussed with the patient and/or family including the risk of injury to internal organs (including perforation) and the risk of missed lesions. All questions were answered and informed consent was obtained. Patient identification and proposed procedure were verified by the physician, the nurse, the  anesthetist and the technician in the pre-procedure area in the procedure room. After this verification the variable stiffness pediatric colonoscope or upper endoscope was passed under direct vision. No sedation was administered by endoscopist. Sedation was administered by the anesthesia staff. Refer to anesthesia documentation/charting for details regarding medications administered and doses given. Events Procedure Events Event Event Time ENDO SCOPE IN TIME 12/7/2023  9:37 AM ENDO SCOPE OUT TIME 12/7/2023  9:46 AM Specimens No specimens collected Procedure Location [facility] [place] [address] [facility] [id number] Referring Provider Julito Orona Do [address] [facility] [address] [place] [address] Procedure Provider Jassi Herbert DO      Scheduled medications:  [START ON 12/19/2023] amiodarone, 200 mg, oral, Daily  [Held by provider] eplerenone, 50 mg, oral, Daily  [START ON 12/19/2023] furosemide, 100 mg, intravenous, BID  levothyroxine, 25 mcg, oral, Daily  melatonin, 3 mg, oral, Daily  metOLazone, 5 mg, oral, Daily  [START ON 12/19/2023] pantoprazole, 40 mg, oral, Daily before breakfast   Or  [START ON 12/19/2023] pantoprazole, 40 mg, intravenous, Daily before breakfast  polyethylene glycol, 17 g, oral, Daily  rosuvastatin, 5 mg, oral, Daily  sucralfate, 1 g, oral, 4x daily  tafamidis, 61 mg, oral, Daily      Continuous medications:     PRN medications:  PRN medications: acetaminophen, bisacodyl, guaiFENesin, promethazine **OR** promethazine, [START ON 12/19/2023] traMADol      Past Medical History  He has a past medical history of Anemia, Arrhythmia, Atherosclerosis of coronary artery bypass graft(s) without angina pectoris (07/15/2016), Atherosclerosis of coronary artery bypass graft(s), unspecified, with other forms of angina pectoris (CMS/[facility]) (04/22/2019), Benign prostatic hyperplasia without lower urinary tract symptoms, CHF (congestive heart failure) (CMS/[facility]),  Hyperlipidemia, Hypertension, Other chest pain (04/01/2016), Other chest pain (09/17/2019), Other chest pain (05/11/2018), Peripheral vascular disease (CMS/[facility]), Personal history of other diseases of the circulatory system (04/01/2016), Personal history of other diseases of the digestive system, Personal history of other diseases of the musculoskeletal system and connective tissue, Personal history of other diseases of the musculoskeletal system and connective tissue (12/07/2018), Personal history of other diseases of urinary system (06/21/2019), Personal history of other specified conditions (04/01/2016), Personal history of other specified conditions (07/15/2016), and Sleep apnea.    Surgical History  He has a past surgical history that includes Rotator cuff repair (04/01/2016); Other surgical history (04/01/2016); Coronary artery bypass graft (04/01/2016); Cataract extraction (04/01/2016); Total knee arthroplasty (04/01/2016); Tonsillectomy (04/01/2016); Adenoidectomy (04/01/2016); Other surgical history (12/09/2021); Other surgical history (12/09/2021); Ileostomy (11/19/2022); Other surgical history (10/23/2018); Other surgical history (10/22/2018); CT guided percutaneous peritoneal or retroperitoneal fluid collection drainage (10/08/2018); US guided percutaneous peritoneal or retroperitoneal fluid collection drainage (10/01/2018); US guided abscess drain (10/01/2018); and Knee Arthroplasty.     Social History  He reports that he has quit smoking. His smoking use included cigarettes. He has never used smokeless tobacco. He reports that he does not currently use alcohol. He reports that he does not use drugs.    Family History  Family History   Problem Relation Name Age of Onset    Other (Cardiac disorder) Other Sibling         Allergies  Ace inhibitors, Eptifibatide, Ezetimibe, Ezetimibe-simvastatin, Metoprolol, Pravastatin, and Rosuvastatin    Code Status  DNR and No Intubation     Review of Systems    Constitutional:  Positive for activity change and fatigue. Negative for chills and fever.   Eyes:  Negative for photophobia and visual disturbance.   Respiratory:  Positive for shortness of breath. Negative for cough.    Cardiovascular:  Positive for leg swelling. Negative for chest pain and palpitations.   Gastrointestinal:  Negative for abdominal pain, blood in stool (not recently, does report intermittent bleeding over the last few months however), nausea and vomiting.   Endocrine: Negative for polydipsia and polyuria.   Genitourinary:  Positive for decreased urine volume and scrotal swelling. Negative for dysuria, hematuria and urgency.   Musculoskeletal:  Positive for joint swelling.   Skin:  Negative for color change and rash.   Neurological:  Negative for dizziness, tremors, syncope, weakness and light-headedness.   Psychiatric/Behavioral:  Negative for confusion and sleep disturbance.    All other systems reviewed and are negative.    Last Recorded Vitals  /66 (BP Location: Left arm, Patient Position: Sitting)   Pulse 70   Temp 36.6 °C (97.8 °F) (Temporal)   Resp 18   Wt 85.7 kg (189 lb)   SpO2 98%      Physical Exam  Vitals and nursing note reviewed.   Constitutional:       General: He is awake. He is not in acute distress.     Appearance: He is well-developed. He is not ill-appearing, toxic-appearing or diaphoretic.   HENT:      Head: Normocephalic and atraumatic.      Nose: Nose normal.      Mouth/Throat:      Pharynx: No oropharyngeal exudate or posterior oropharyngeal erythema.   Eyes:      General: No scleral icterus.     Extraocular Movements: Extraocular movements intact.      Pupils: Pupils are equal, round, and reactive to light.   Cardiovascular:      Rate and Rhythm: Normal rate and regular rhythm.      Pulses: Normal pulses.      Heart sounds: No murmur heard.     No friction rub. No gallop.      Comments: L chest-wall PPM  Pulmonary:      Effort: Pulmonary effort is normal. No  respiratory distress.      Breath sounds: No wheezing.      Comments: Diminished breath sounds bilaterally  Chest:      Chest wall: No tenderness.   Abdominal:      General: Bowel sounds are normal. There is no distension.      Palpations: Abdomen is soft. There is no hepatomegaly, splenomegaly or mass.      Tenderness: There is no abdominal tenderness.      Comments: Ostomy with green output, no evidence of bleeding    Genitourinary:     Comments: Significant scrotal edema  Musculoskeletal:         General: Swelling present. No deformity or signs of injury. Normal range of motion.      Cervical back: Normal range of motion and neck supple. No rigidity or tenderness.      Right lower leg: Edema present.      Left lower leg: Edema present.      Comments: BLE edema extending up through thighs and into scrotum   Skin:     General: Skin is warm and dry.      Capillary Refill: Capillary refill takes less than 2 seconds.      Coloration: Skin is not pale.      Findings: Bruising present. No erythema, lesion or rash.   Neurological:      General: No focal deficit present.      Mental Status: He is alert and oriented to person, place, and time.      Cranial Nerves: No cranial nerve deficit.      Sensory: No sensory deficit.   Psychiatric:         Mood and Affect: Mood normal.         Behavior: Behavior normal. Behavior is cooperative.         Thought Content: Thought content normal.         Judgment: Judgment normal.       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    1.) Acute on chronic CHF   -Evidenced by presenting sxs: RENAE, LE edema, weight gain  -CXR with: congestion  -Echocardiogram update ordered   -BNP: 942  -Initiate lasix 80mg IVP BID --> 100mg IVP BID  -Monitor renal function closely with diuresis   -Monitor electrolytes and replenish generously as needed   -Strict I&O’s   -2g salt restriction, 2L fluid restriction   -Monitor fluid status closely   -Cardiology consultation, appreciate further recommendations     2.) BRUNO on CKD   -Baseline serum creatinine: ~2.3-2.6  -Creatinine at admission: 3.00 with BUN 90 (increase > 0.3mg/dL from baseline)  -Suspect multifactorial -- likely a degree of hypervolemia in addition to episodes of lower BP  -Follow UOP  -Recheck renal function panel in AM. Can check urine studies if continuing to worsen.   -Nephrology consultation, appreciate further recommendations     3.) Type II MI / Demand ischemia   -Baseline troponin leak, but slightly elevated from baseline   -Suspect multifactorial in setting of #1, #2   -No CP or anginal symptoms presently   -Continued on home therapies. Tele. Echo update.   -Cardiology following as above     4.) Paroxysmal atrial fibrillation/flutter s/p DCCV  -No current AC d/t hx GIB  -Continue home therapies  -Tele and electrolyte monitoring     5.) s/p PPM   -Tele  -Device clinic as scheduled     6.) HTN --> intermittent HoTN, DLD, CAD s/p remote CABGx3 and PCI to RCA  -Continued on home therapies   -Close BP monitoring, currently trending softer end of normal     7.)  wtTTR amyloid  -Continue home meds  -Cardiology following as above     8.) COPD  -Not in acute exacerbation   -Continued on home therapies     9.) Rectal CA s/p proctocolectomy 2018 (with colostomy) and recurrent GI bleeds (following with GI)  -No evidence of bleeding acutely  -Continue home therapies  -Continue outpatient follow-up as scheduled     CODE STATUS: DNR/DNI (ACP document placed in chart, has [place] DNR-CCA form as well), confirmed on admission     DVT Prophylaxis: SCDs, no AC d/t hx bleeds        Dianne Godinez PA-C    Dragon dictation software was used to dictate this note and thus there may be minor errors in translation/transcription including garbled speech or misspellings. Please contact for clarification if needed.

## 2023-12-18 NOTE — CONSULTS
Inpatient consult to Cardiology  Consult performed by: Bev Alston MD  Consult ordered by: Dianne Godinez PA-C  Reason for consult: CHF        History Of Present Illness:    Michael Duran is a 83 y.o. male presenting with worsening ankle swelling, currently up to the groin and scrotum, failing outpatient diuretic therapy.  He has history of coronary artery disease, status post bypass surgery several years ago, PCI to left circumflex artery in 2014, and more recently in 5/4/18 for chronic stable angina, inability to tolerate medicines. He received a 2.5 mm resolute Robin drug-eluting stent to left circumflex. He had a preserved LV function, previously, now around 45%.    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   Somewhere is 2019/2020, he developed atrial flutter with slow ventricular response. He was asymptomatic initially, we still arranged a stress test for chronotropic response initially this was normal. The he slowly became symptomatic with shortness of breath. He was referred to Dr. Reynoso who performed a DC cardioversion, hoping that heart rate would improve after restoration of sinus rhythm. After the cardioversion he came in for an urgent evaluation. He was not feeling well, in fact worse after the cardioversion more short of breath with dizziness. His EKG showed sinus rhythm with prolonged first-degree AV delay occasional PVCs and IVCD. Heart rate is 65 bpm. Subsequently we detected Mobitz type I blockage. At that time given the symptoms of dizziness shortness of breath on exertion we decided to implant a pacemaker. He then started developing some heart failure symptoms. Because of recurrent atrial fibrillation he was started on amiodarone. He was having lot of dizziness/balance problems-after multiple investigations we concluded that his balance problems are due to vertigo. Currently this is slowly improving with physical therapy.  A more recent  echo in spring 2022 showed that LVEF has declined to 45%. 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 In April 2022 it came to our attention on a routine pacemaker check that he had a sustained but slow ventricular tachycardia with a heart rate around 150 bpm. He was not admitted to the hospital with wide-complex tachycardia but turned out this was atrial tachycardia with tracking. Tracking was turned off. He was discharged home. At that point we decided to initiate evaluation for cardiac amyloidosis. Blood work and urine analysis was negative but PYP scan was strongly suggestive of ATTR amyloidosis. However an MRI that was concomitantly ordered by Dr. Stout does not support the diagnosis.  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    Most recently he has had multiple hospital admissions with GI bleed and severe anemia.  Status post multiple transfusions GI evaluation and subsequently oral anticoagulant therapy excepting aspirin was stopped.  He has follow-up with GI as well as with interventional team for Watchman device placement.     He states that shortness of breath is at baseline.  Going regularly to the CHF clinic.  Reviewed lab work that reveals pancytopenia.     His ECG demonstrated atrial ventricular paced rhythm.    ..     Last Recorded Vitals:  Vitals:    12/18/23 1330 12/18/23 1357 12/18/23 1430 12/18/23 1500   BP: 108/67  114/59 105/66   BP Location: Left arm  Left arm Left arm   Patient Position: Sitting  Sitting Sitting   Pulse: 71 70 70 70   Resp: 18 18 18 18   Temp:       TempSrc:       SpO2: 98%  98% 98%   Weight:       Height:           Last Labs:  CBC - 12/18/2023: 10:25 AM  4.4 7.8 140    24.9      CMP - 12/18/2023:  8:44 AM  8.7 7.2 55 --- 0.7   4.1 3.3 26 138      PTT - 10/26/2023: 12:26 PM  1.2   13.1 32     Troponin I, High Sensitivity   Date/Time Value Ref Range Status   12/18/2023 11:22   (HH) 0 - 20 ng/L Final     Comment:     Previous result verified on 12/18/2023 1106 on specimen/case [id number] called with component TRPHS for procedure Troponin I, High Sensitivity, Initial with value 143 ng/L.   12/18/2023 10:25  (HH) 0 - 20 ng/L Final   10/08/2023 06:21  (HH) 0 - 20 ng/L Final     Comment:     Previous result verified on 10/7/2023 1215 on specimen/case [id number] called with component TRPHS for procedure Troponin I, High Sensitivity with value 166 ng/L.     BNP   Date/Time Value Ref Range Status   12/18/2023 10:25  (H) 0 - 99 pg/mL Final   12/18/2023 08:44  (H) 0 - 99 pg/mL Final     Hemoglobin A1C   Date/Time Value Ref Range Status   06/04/2023 09:44 AM 4.7 4.3 - 5.6 % Final     Comment:     American Diabetes Association guidelines indicate that patients with HgbA1c in the range 5.7-6.4% are at increased risk for development of diabetes, and intervention by lifestyle modification may be beneficial. HgbA1c greater or equal to 6.5% is considered diagnostic of diabetes.   03/10/2022 10:17 AM 6.1 (A) % Final     Comment:          Diagnosis of Diabetes-Adults   Non-Diabetic: < or = 5.6%   Increased risk for developing diabetes: 5.7-6.4%   Diagnostic of diabetes: > or = 6.5%  .       Monitoring of Diabetes                Age (y)     Therapeutic Goal (%)   Adults:          >18           <7.0   Pediatrics:    13-18           <7.5                   7-12           <8.0                   0- 6            7.5-8.5   American Diabetes Association. Diabetes Care 33(S1), Jan 2010.       VLDL   Date/Time Value Ref Range Status   03/29/2023 06:16 PM 21 0 - 40 mg/dL Final   03/29/2023 03:41 PM CANCELED       Comment:     Result canceled by the ancillary.   10/24/2022 07:03 AM 24 0 - 40 mg/dL Final      Last I/O:  No intake/output data recorded.    Past Cardiology Tests (Last 3 Years):  EKG:  ECG 12 Lead       ECG 12 lead (Clinic Performed) 11/08/2023      ECG 12 lead (Clinic  "Performed) 10/19/2023    Echo:  No results found for this or any previous visit from the past 1095 days.    Ejection Fractions:  No results found for: \"EF\"  Cath:  No results found for this or any previous visit from the past 1095 days.    Stress Test:  Nuclear Stress Test 06/16/2022    Cardiac Imaging:  MR cardiac morphology and function w and wo IV contrast 10/03/2022      Past Medical History:  He has a past medical history of Anemia, Arrhythmia, Atherosclerosis of coronary artery bypass graft(s) without angina pectoris (07/15/2016), Atherosclerosis of coronary artery bypass graft(s), unspecified, with other forms of angina pectoris (CMS/[facility]) (04/22/2019), Benign prostatic hyperplasia without lower urinary tract symptoms, CHF (congestive heart failure) (CMS/[facility]), Hyperlipidemia, Hypertension, Other chest pain (04/01/2016), Other chest pain (09/17/2019), Other chest pain (05/11/2018), Peripheral vascular disease (CMS/[facility]), Personal history of other diseases of the circulatory system (04/01/2016), Personal history of other diseases of the digestive system, Personal history of other diseases of the musculoskeletal system and connective tissue, Personal history of other diseases of the musculoskeletal system and connective tissue (12/07/2018), Personal history of other diseases of urinary system (06/21/2019), Personal history of other specified conditions (04/01/2016), Personal history of other specified conditions (07/15/2016), and Sleep apnea.    Past Surgical History:  He has a past surgical history that includes Rotator cuff repair (04/01/2016); Other surgical history (04/01/2016); Coronary artery bypass graft (04/01/2016); Cataract extraction (04/01/2016); Total knee arthroplasty (04/01/2016); Tonsillectomy (04/01/2016); Adenoidectomy (04/01/2016); Other surgical history (12/09/2021); Other surgical history (12/09/2021); Ileostomy (11/19/2022); Other surgical history (10/23/2018); Other surgical history " (10/22/2018); CT guided percutaneous peritoneal or retroperitoneal fluid collection drainage (10/08/2018); US guided percutaneous peritoneal or retroperitoneal fluid collection drainage (10/01/2018); US guided abscess drain (10/01/2018); and Knee Arthroplasty.      Social History:  He reports that he has quit smoking. His smoking use included cigarettes. He has never used smokeless tobacco. He reports that he does not currently use alcohol. He reports that he does not use drugs.    Family History:  Family History   Problem Relation Name Age of Onset    Other (Cardiac disorder) Other Sibling         Allergies:  Ace inhibitors, Eptifibatide, Ezetimibe, Ezetimibe-simvastatin, Metoprolol, Pravastatin, and Rosuvastatin    Inpatient Medications:  Scheduled medications   Medication Dose Route Frequency    [START ON 12/19/2023] amiodarone  200 mg oral Daily    [Held by provider] eplerenone  50 mg oral Daily    [START ON 12/19/2023] furosemide  100 mg intravenous BID    levothyroxine  25 mcg oral Daily    melatonin  3 mg oral Daily    metOLazone  5 mg oral Daily    [START ON 12/19/2023] pantoprazole  40 mg oral Daily before breakfast    Or    [START ON 12/19/2023] pantoprazole  40 mg intravenous Daily before breakfast    polyethylene glycol  17 g oral Daily    rosuvastatin  5 mg oral Daily    sucralfate  1 g oral 4x daily    tafamidis  61 mg oral Daily     PRN medications   Medication    acetaminophen    bisacodyl    guaiFENesin    promethazine    Or    promethazine    [START ON 12/19/2023] traMADol     Continuous Medications   Medication Dose Last Rate     Outpatient Medications:  Current Outpatient Medications   Medication Instructions    amiodarone (PACERONE) 200 mg, oral, Daily    diphenoxylate-atropine (Lomotil) 2.5-0.025 mg tablet 2 tablets, oral, 4 times daily PRN    eplerenone (INSPRA) 50 mg, oral, Daily    Furoscix 80 mg, subcutaneous, Daily, For one week. Then once weekly after that    levothyroxine (SYNTHROID,  LEVOXYL) 25 mcg, oral, Daily    molnupiravir (Lagevrio, EUA,) 200 mg capsule 1 capsule, Every 12 hours    pantoprazole (PROTONIX) 40 mg, oral, Daily, Do not crush, chew, or split.    rosuvastatin (CRESTOR) 5 mg, oral, Daily    sucralfate (CARAFATE) 1 g, oral, 4 times daily, Take 1 hour before meals and at bedtime    tamsulosin (FLOMAX) 0.4 mg, oral, Daily    torsemide (DEMADEX) 50 mg, oral, 2 times daily    traMADol (ULTRAM) 50 mg, oral, Daily PRN    Vyndamax 61 mg, oral, Daily       Physical Exam:  Physical Exam  Vitals reviewed.   Constitutional:       Appearance: Normal appearance.   Neck:      Vascular: No carotid bruit or JVD.   Cardiovascular:      Rate and Rhythm: Normal rate and regular rhythm.      Heart sounds: Normal heart sounds, S1 normal and S2 normal. No murmur heard.  Pulmonary:      Effort: Pulmonary effort is normal.      Breath sounds: Normal breath sounds.   Abdominal:      General: Abdomen is flat. Bowel sounds are normal.      Palpations: Abdomen is soft.   Musculoskeletal:      Right lower le+ Edema present.      Left lower le+ Edema present.   Skin:     General: Skin is warm.   Neurological:      Mental Status: He is alert. Mental status is at baseline.   Psychiatric:         Mood and Affect: Mood normal.         Behavior: Behavior normal.           Assessment/Plan   1-acute on chronic systolic and diastolic heart failure-repeat echo, limited for LV function.  Patient has amyloid cardiomyopathy.  Continue IV diuretics plan on adding metolazone for symptom control and continue metolazone outpatient 2-3 times a week and close outpatient follow-up in CHF clinic for electrolyte monitoring.    2-atrial fibrillation-maintaining sinus rhythm with amiodarone.    Anticoagulation discontinued due to recurrent bleed he was referred to Watchman device clinic.    3-coronary artery disease-stable continue outpatient therapy.       Code Status:  DNR and No Intubation          Bev Alston  MD

## 2023-12-18 NOTE — PROGRESS NOTES
Current Outpatient Medications:     amiodarone (Pacerone) 200 mg tablet, Take 1 tablet (200 mg) by mouth once daily., Disp: , Rfl:     diphenoxylate-atropine (Lomotil) 2.5-0.025 mg tablet, Take 2 tablets by mouth 4 times a day as needed for diarrhea., Disp: , Rfl:     eplerenone (Inspra) 25 mg tablet, Take 2 tablets (50 mg) by mouth once daily., Disp: , Rfl:     levothyroxine (Synthroid, Levoxyl) 25 mcg tablet, Take 1 tablet (25 mcg) by mouth once daily., Disp: , Rfl:     pantoprazole (ProtoNix) 40 mg EC tablet, Take 1 tablet (40 mg) by mouth once daily. Do not crush, chew, or split., Disp: 30 tablet, Rfl: 0    rosuvastatin (Crestor) 5 mg tablet, Take 1 tablet (5 mg) by mouth once daily., Disp: 30 tablet, Rfl: 11    sucralfate (Carafate) 1 gram tablet, Take 1 tablet (1 g) by mouth 4 times a day for 14 days. Take 1 hour before meals and at bedtime, Disp: 56 tablet, Rfl: 0    tafamidis (Vyndamax) 61 mg capsule, Take 1 capsule (61 mg) by mouth once daily., Disp: 90 capsule, Rfl: 3    tamsulosin (Flomax) 0.4 mg 24 hr capsule, Take 1 capsule (0.4 mg) by mouth once daily., Disp: , Rfl:     torsemide (Demadex) 100 mg tablet, Take 0.5 tablets (50 mg) by mouth 2 times a day. (Patient taking differently: Take 1 tablet (100 mg) by mouth once daily.), Disp: 30 tablet, Rfl: 1    traMADol (Ultram) 50 mg tablet, Take 1 tablet (50 mg) by mouth once daily as needed., Disp: , Rfl:     furosemide (Furoscix) 80 mg/10 mL kit, Inject 80 mg under the skin once daily. For one week. Then once weekly after that (Patient not taking: Reported on 12/18/2023), Disp: 12 kit, Rfl: 0    molnupiravir (Lagevrio, EUA,) 200 mg capsule, every 12 hours., Disp: , Rfl: Subjective   Patient ID: Michael Duran is a 83 y.o. male who presents for follow-up of congestive heart failure.     Current Outpatient Medications:     amiodarone (Pacerone) 200 mg tablet, Take 1 tablet (200 mg) by mouth once daily., Disp: , Rfl:     diphenoxylate-atropine (Lomotil)  2.5-0.025 mg tablet, Take 2 tablets by mouth 4 times a day as needed for diarrhea., Disp: , Rfl:     eplerenone (Inspra) 25 mg tablet, Take 2 tablets (50 mg) by mouth once daily., Disp: , Rfl:     levothyroxine (Synthroid, Levoxyl) 25 mcg tablet, Take 1 tablet (25 mcg) by mouth once daily., Disp: , Rfl:     pantoprazole (ProtoNix) 40 mg EC tablet, Take 1 tablet (40 mg) by mouth once daily. Do not crush, chew, or split., Disp: 30 tablet, Rfl: 0    rosuvastatin (Crestor) 5 mg tablet, Take 1 tablet (5 mg) by mouth once daily., Disp: 30 tablet, Rfl: 11    sucralfate (Carafate) 1 gram tablet, Take 1 tablet (1 g) by mouth 4 times a day for 14 days. Take 1 hour before meals and at bedtime, Disp: 56 tablet, Rfl: 0    tafamidis (Vyndamax) 61 mg capsule, Take 1 capsule (61 mg) by mouth once daily., Disp: 90 capsule, Rfl: 3    tamsulosin (Flomax) 0.4 mg 24 hr capsule, Take 1 capsule (0.4 mg) by mouth once daily., Disp: , Rfl:     torsemide (Demadex) 100 mg tablet, Take 0.5 tablets (50 mg) by mouth 2 times a day. (Patient taking differently: Take 1 tablet (100 mg) by mouth once daily.), Disp: 30 tablet, Rfl: 1    traMADol (Ultram) 50 mg tablet, Take 1 tablet (50 mg) by mouth once daily as needed., Disp: , Rfl:     furosemide (Furoscix) 80 mg/10 mL kit, Inject 80 mg under the skin once daily. For one week. Then once weekly after that (Patient not taking: Reported on 12/18/2023), Disp: 12 kit, Rfl: 0    molnupiravir (Lagevrio, EUA,) 200 mg capsule, every 12 hours., Disp: , Rfl:      HPI   Patient has past medical history of CAD with remote CABG.  He had PCI to the circumflex in 2014.  He has a permanent pacemaker in place.  He has stage III renal dysfunction.  He has a history of colon cancer.  He has a colostomy.  He has had persistent GI bleeding despite multiple attempts to cauterize the bleeding areas.  He has chronic iron deficiency anemia secondary to the ongoing bleeding issues.   LVEF per last echocardiogram 45%  Last  admission at [facility] he responded well to IV Lasix drip continuous with intermittent IV diuril.      He is diligent about taking medications.  He had IV Lasix with diuril at infusion center last week. He has not lost any significant weight and symptoms are unchanged.   Last Cr. 2.68     Review of Systems   Constitutional:  Positive for fatigue. Negative for activity change, chills and fever.   HENT:  Negative for hearing loss.    Eyes: Negative.    Respiratory:  Negative for cough, chest tightness, shortness of breath and wheezing.    Cardiovascular:  Positive for leg swelling. Negative for chest pain and palpitations.   Gastrointestinal:  Positive for blood in stool. Negative for abdominal distention.        Occasional bleeding from the colostomy    Genitourinary:  Positive for scrotal swelling. Negative for hematuria.   Neurological:  Negative for syncope, weakness and light-headedness.   Psychiatric/Behavioral:  Negative for confusion.        Objective     /72 (BP Location: Left arm, Patient Position: Sitting, BP Cuff Size: Adult)   Pulse 74   Resp 20   Wt 85.9 kg (189 lb 4.8 oz)   SpO2 96%   BMI 29.65 kg/m²           Lab Results   Component Value Date    BUN 82 (H) 12/14/2023    CREATININE 2.68 (H) 12/14/2023     (H) 11/09/2023    MG 2.38 12/14/2023    K 5.4 (H) 12/14/2023     (L) 12/14/2023       Constitutional:       General: NAD  HENT:   Normocephalic.  No other gross abnormality.   +JVD   Cardiovascular:      Rate and Rhythm: Normal rate and regular rhythm.      Heart sounds: S1, S2 normal, no murmurs, no S3 or S4    Pulmonary:      Effort: Pulmonary effort is normal.      Breath sounds:  Normal respiratory excursion. No wheezes or rales  Abdominal:      General: Abdomen is softly distended. Bowel sounds are normal.   Musculoskeletal:         General:  BACK well. Firm pitting edema extending into the groin.   Skin:     General: Skin is warm and dry.      Assessment/Plan         Problem  List Items Addressed This Visit          Cardiac and Vasculature    Chronic diastolic congestive heart failure (CMS/HCC) - Primary    Relevant Orders    Basic Metabolic Panel    B-Type Natriuretic Peptide    Magnesium         Chronic  systolic/diastolic heart failure   Etiology:  Amyloidosis   AHA Stage: D  NYHA class: 4   Volume Status:  He has gained 23# in the past three weeks.   Discharge weight from #  GFR: 27     GDMT:  BB- hold  ( on amiodarone)   ARB/ACEI/ARNI - on hold due to renal insufficiency   MRA -  Eplerenone   SGLT2i - on hold  Diuretic -  Torsemide 100 mg daily   Device Therapy:  PPM      CHF:  Weight and sx unchanged with OP IV diuretic therapy.   Check BMP/Mag. TSH with reflex T4 today.  VS are stable.  If BUN/CR are improved will repeat IV Lasix drip and diuril.   Can consider  trial of Vericiguat (EF 45%) Prognosis is poor.  Would be appropriate to consider palliative or hospice care.     He has decided on a DNR [facility]- Arrest status.        2. Chronic blood loss anemia:  Following with GI.  He has active bleeding which has not been able to be completely cauterized. Still have some bleeding from colostomy on current medications. Hb 7.3 on 12/7/23.     3.  PAF:  RRR. (Paced) No OAC due to ongoing bleeding.      4. CKD 4:  Cr. 3.0 today.   BRUNO in setting of acute systolic/diastolic heart failure.  Patient will be referred to ER for further evaluation and treatment.

## 2023-12-18 NOTE — PROGRESS NOTES
Michael Duran is a 83 y.o. male admitted for Heart failure, acute on chronic, systolic and diastolic (CMS/[facility]). Pharmacy reviewed the patient's [id number] medications and allergies for accuracy.    The list below reflects the PTA list prior to pharmacy medication history. A summary a changes to the PTA medication list has been listed below. Please review each medication in order reconciliation for additional clarification and justification.    Medications added:   ASPIRIN 81 MG  JARDIANCE 25 MG    Medications modified:   TORSEMIDE 100 MG 0.5 BID---> 1 QD    Medications to be removed:   FUROSEMIDE 80 MG/10 ML KIT    Medications of concern:      Prior to Admission Medications   Prescriptions Last Dose Informant Patient Reported?    amiodarone (Pacerone) 200 mg tablet   Yes    Sig: Take 1 tablet (200 mg) by mouth once daily.   diphenoxylate-atropine (Lomotil) 2.5-0.025 mg tablet   Yes    Sig: Take 2 tablets by mouth 4 times a day as needed for diarrhea.   eplerenone (Inspra) 25 mg tablet   Yes    Sig: Take 2 tablets (50 mg) by mouth once daily.   furosemide (Furoscix) 80 mg/10 mL kit   No    Sig: Inject 80 mg under the skin once daily. For one week. Then once weekly after that   Patient not taking: Reported on 12/18/2023   levothyroxine (Synthroid, Levoxyl) 25 mcg tablet   Yes    Sig: Take 1 tablet (25 mcg) by mouth once daily.   molnupiravir (Lagevrio, EUA,) 200 mg capsule   Yes    Sig: every 12 hours.   pantoprazole (ProtoNix) 40 mg EC tablet   No    Sig: Take 1 tablet (40 mg) by mouth once daily. Do not crush, chew, or split.   rosuvastatin (Crestor) 5 mg tablet   No    Sig: Take 1 tablet (5 mg) by mouth once daily.   sucralfate (Carafate) 1 gram tablet   No    Sig: Take 1 tablet (1 g) by mouth 4 times a day for 14 days. Take 1 hour before meals and at bedtime   tafamidis (Vyndamax) 61 mg capsule   No    Sig: Take 1 capsule (61 mg) by mouth once daily.   tamsulosin (Flomax) 0.4 mg 24 hr capsule   Yes     Sig: Take 1 capsule (0.4 mg) by mouth once daily.   torsemide (Demadex) 100 mg tablet   No    Sig: Take 0.5 tablets (50 mg) by mouth 2 times a day.   Patient taking differently: Take 1 tablet (100 mg) by mouth once daily.   traMADol (Ultram) 50 mg tablet   Yes    Sig: Take 1 tablet (50 mg) by mouth once daily as needed.      Facility-Administered Medications: None       Elaine Ailing, hT

## 2023-12-19 NOTE — PROGRESS NOTES
Occupational Therapy                 Therapy Communication Note    Patient Name: Michael Duran  MRN: [id number]  Today's Date: 12/19/2023     Discipline: Occupational Therapy     Missed Visit Reason: Other (Comment) (No O.T. needs, screen/DC)    Comment: Patient observed amb. With P.T. in li with SBA. No O.T. needs.  Discharge O.T.

## 2023-12-19 NOTE — CONSULTS
" Palliative Care Consultation    History obtained from: patient and medical records     HPI: Michael Duran is a 83 y.o. 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 Follows with HF clinic. Patient came to the ED 12/18 d/t increasing edema, SOB, and weight gain. He is being treated for acute on chronic systolic and diastolic heart failure, BRUNO on CKD, type II MI/demand ischemia, and anemia.     Living arrangement and functional status prior to admission: Perez lives alone, uses a walker, not driving-sister transports to medical appointments who also does errands and gets groceries-daughter lives nearby and also assists as needed. Independent in dressing and bathing. Son, daughter and grandson live nearby and visit routinely.   QOL over last 6 months: \"ups and downs\"   Falls: October   Prior Hospitalizations: patient has had 13 hospitalizations and 11 ED visits in the last 12 months    11/13-11/22/23: [facility]: acute decompensated heart failure   10/26-10/28/23:  [facility]: acute on chronic blood loss anemia, upper GIB  10/7-10/11/23:  [facility]: GI bleed  10/5/23: ED weakness, mechanical fall hitting head  9/26-10/3/23: [facility]: pericardial effusion, acute decompensated heart failure   9/17/23: ED: anemia  9/13/23: ED: hypertensive chronic kidney disease  9/8/23: ED: anemia   8/31-9/2/23:  [facility]: blood loss anemia  8/30-8/31/23:  [facility]: gastrointestinal hemorrhage  7/27-8/1/23: [facility]: BRUNO, cellulitis left lower extremity   7/4/23: ED anemia   6/3-6/15/23: [facility]: HFpEF  5/31-6/3/23:  [facility] acute on chronic combined congestive heart failure, BRUNO on CKD   5/28/23: ED (2nd visit in one day) rivera leaking  5/28/23: ED rivera leaking   5/26/23: ED fluid overload  5/25/23: ED urinary retention   5/19-5/21/23:  [facility] anemia, nonischemic " "MI  4/27-5/1/23:  [facility]: fluid overload, BRUNO on CKD   4/22/23: ED edema  4/14-4/16/23:  [facility]: 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   Employment: retired     Status: yes, served in the "[employer] active with [employer]   Tobacco/alcohol/elicit substance use history: former smoker, quit 20 years ago. Denies alcohol or drugs   Anabaptist/Cultural/Spiritual: Believes in God   Patient finds ladonna and happiness in: \"just feeling good\" and traveling in the past     Palliative care consulted for goals of care, code status, advanced directive, high risk for readmissions, symptom management and outpatient support.    Met with patient who declined other loved ones to be present during meeting. Discussed PMH, HPI, and current hospitalization.     Symptom Assessment  Pain: fluid retention causes some pain but current declines, PRN tylenol and tramadol (tramadol noted on OARRS as well)  Dyspnea: diuretics, heart failure medications  Bowels: colostomy, chronically loose/watery/pasty stools, last BM today, daily miralax, PRN bisacodyl, diphenoxylate-atropine (Lomotil) noted on OARRS  GI bleed: denies recent bloody stools   Nausea/Vomiting: Carafate QID, chronic GI bleed, PRN phenergan (prolonged Qtc). Denies   Depression: denies  Anxiety: denies but states he feels \"antsy\"which was noted during meeting with fidgeting and need to stand  Insomnia: scheduled melatonin, zolpidem noted on OARRS  Drowsiness/tiredness/fatigue: denies   Appetite: \"too good\"  Weight changes: recently gained weight from appetite and fluid   Dysphagia: denies   Skin " "impairment: denies     Goals of Care  Decisional Capacity: Yes  Understanding of illness: good   Expectations of treatment/goals of care: \"just get the fluids off my body, bring the weight down\" discussed renal impairment and hypotension may limit optimization of cardiac medications in which he verbalized understanding, accepting of further hospital readmissions  Fears: denies     Advanced Care Planning  Advance Directives:    Health care power of : patient initially stated he had documents but later requested assistance to complete tomorrow.    Living Will: will assist patient in completing tomorrow    [place] DNR form: DNR CCA on file in EMR-copy printed per patient request as he states he does not have a copy at home     Current code status: DNR and No Intubation   Code status discussed today? Yes, confirmed DNR/DNI, ok for pressors      Is the patient hospice-eligible? Yes  Was a discussion held re: hospice services? No, does not align with patients goals to continue hospital readmissions  Was a discussion held re: outpatient palliative care services? Yes  Was a decision made re: outpatient palliative care services? No, provided education, agency list and pamphlets. Patient to consider and will follow up tomorrow     Review of Systems   Constitutional:  Positive for fatigue. Negative for activity change and appetite change.   Cardiovascular:  Positive for leg swelling.   Gastrointestinal:         Loose stools    Neurological:  Positive for weakness.   Psychiatric/Behavioral:  Negative for confusion.        Physical Exam  Constitutional:       General: He is not in acute distress.  HENT:      Head: Normocephalic.      Nose: Nose normal.      Mouth/Throat:      Mouth: Mucous membranes are moist.   Eyes:      General: No scleral icterus.  Pulmonary:      Comments: No conversational dyspnea noted, on room air   Abdominal:      Palpations: Abdomen is soft.   Genitourinary:     Comments: Clear yellow urine noted " in urinal   Musculoskeletal:      Right lower leg: Edema present.      Left lower leg: Edema present.   Skin:     General: Skin is dry.   Neurological:      Mental Status: He is alert and oriented to person, place, and time.   Psychiatric:         Mood and Affect: Mood normal.         Judgment: Judgment normal.       Plan    Goals of care: continue further hospital admissions, consider outpatient palliative care   Completed advanced directives: No, will assist tomorrow per patients request   Change in code status: No, confirmed current DNR/DNI  Completed [place] DNR: No, printed a copy of DNR CCA on file in EMR and instructed patient to place on refrigerator once home.   Interventions for symptom management: No  Consult music therapy and pastoral care   Outpatient services/resources recommended: outpatient palliative care, will follow up tomorrow on decision    Collaborated with: RN    I spent 80 minutes in the professional and overall care of this patient.      Linwood Mccarthy, APRN-CNP

## 2023-12-19 NOTE — CARE PLAN
The patient's goals for the shift include      The clinical goals for the shift include remain hemodynamically stable    Problem: Pain - Adult  Goal: Verbalizes/displays adequate comfort level or baseline comfort level  Outcome: Progressing     Problem: Safety - Adult  Goal: Free from fall injury  Outcome: Progressing     Problem: Discharge Planning  Goal: Discharge to home or other facility with appropriate resources  Outcome: Progressing     Problem: Chronic Conditions and Co-morbidities  Goal: Patient's chronic conditions and co-morbidity symptoms are monitored and maintained or improved  Outcome: Progressing     Problem: Fall/Injury  Goal: Not fall by end of shift  Outcome: Progressing  Goal: Be free from injury by end of the shift  Outcome: Progressing  Goal: Verbalize understanding of personal risk factors for fall in the hospital  Outcome: Progressing  Goal: Verbalize understanding of risk factor reduction measures to prevent injury from fall in the home  Outcome: Progressing  Goal: Use assistive devices by end of the shift  Outcome: Progressing  Goal: Pace activities to prevent fatigue by end of the shift  Outcome: Progressing     Problem: Heart Failure  Goal: Improved gas exchange this shift  Outcome: Progressing  Goal: Improved urinary output this shift  Outcome: Progressing  Goal: Reduction in peripheral edema within 24 hours  Outcome: Progressing  Goal: Report improvement of dyspnea/breathlessness this shift  Outcome: Progressing  Goal: Weight from fluid excess reduced over 2-3 days, then stabilize  Outcome: Progressing  Goal: Increase self care and/or family involvement in 24 hours  Outcome: Progressing

## 2023-12-19 NOTE — PROGRESS NOTES
12/19/23 1100   Discharge Planning   Living Arrangements Alone   Support Systems Children;Family members   Assistance Needed ADL`s   Type of Residence Private residence   Home or Post Acute Services In home services   Type of Home Care Services Home health aide   Patient expects to be discharged to: home   Financial Resource Strain   How hard is it for you to pay for the very basics like food, housing, medical care, and heating? Not hard   Transportation Needs   In the past 12 months, has lack of transportation kept you from medical appointments or from getting medications? no   In the past 12 months, has lack of transportation kept you from meetings, work, or from getting things needed for daily living? No     Met with patient at bedside to discuss discharge planning.   Pt lives alone, however family lives very close and provides assistance with groceries and transportation.  Pt uses a home care agency that provides home health aides, however patient could not remember the name of the company.  [facility]  Pt expects to be discharged home.

## 2023-12-19 NOTE — CARE PLAN
Problem: Mobility  Goal: STG - Patient will ambulate  Description: FWW  FT   Outcome: Not Progressing  Goal: STG - Patient will ascend and descend four to six stairs  Description: USING RAIL FOR SAFE HOME ENTRY SBA  Outcome: Not Progressing  Goal: STRENGTHEING AND BALANCE  Description: 20+ REPS RROM INCREASING STRENGTH AND BALANCE ACTIVITIES TO STABILIZE GAIT  Outcome: Not Progressing

## 2023-12-19 NOTE — PROGRESS NOTES
Michael Duran is a 83 y.o. male on day 0 of admission presenting with Heart failure, acute on chronic, systolic and diastolic (CMS/HCC).      Subjective   Michael Duran is a 83 y.o. 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 Patient reports he is had recurrent bouts of weight gain and required admission multiple times for IV diuresis, noting most recently he has been going to the heart failure clinic and described continued lower extremity edema all the way up into his scrotum. He trialed IV Lasix in the outpatient setting and lost 3 pounds, but did subsequently regain this and more. Believes he has gained a total of 25-30 pounds. He has also noticed some very mild increased shortness of breath on exertion from his baseline; denies chest pain/pressure, dizziness or lightheadedness, diaphoresis, syncope or presyncope, fevers, chills, nausea, vomiting, abdominal pain. Has noticed decreased urine output. He reports compliance with all of his medications and with dietary modifications.     ED Course (Summary):   Vitals on presentation: T97.8, /62, HR 79, RR 18, SpO2 95% RA  Labs: CBC with WBC 4.4, Hgb 7.8, platelets 140.  Chemistry panel with glucose 86, sodium 129 potassium 5.1, BUN 90, serum creatinine 3.00, mag 2.46.  .  High-sensitivity troponin 143-136.  ECG paced, similar in comparison in the system to last few per quick review  Patient was given one-time dose 40 mg IV Lasix    12/19: Patient was evaluated this morning, feeling better, shortness of breath is improving, leg edema getting better.  Denies  apparent bleeding       Objective     Last Recorded Vitals  /70 (BP Location: Left arm, Patient Position: Lying)   Pulse 80   Temp 36.1 °C (97 °F) (Temporal)   Resp 16   Wt 90.5 kg (199 lb 8.3 oz)   SpO2 93%   Intake/Output last 3 Shifts:    Intake/Output Summary (Last 24 hours) at 12/19/2023 1237  Last data filed at 12/19/2023 1038  Gross per 24 hour   Intake 250 ml   Output 1275 ml   Net -1025 ml       Admission Weight  Weight: 85.7 kg (189 lb) (12/18/23 1006)    Daily Weight  12/19/23 : 90.5 kg (199 lb 8.3 oz)    Image Results  XR chest 1 view  Narrative: Interpreted By:  Wendi Hu,   STUDY:  XR CHEST 1 VIEW;  12/18/2023 11:34 am      INDICATION:  Signs/Symptoms:sob.      COMPARISON:  11/07/2023      ACCESSION NUMBER(S):  [id number]      ORDERING CLINICIAN:  JELENA CHAVARRIA      TECHNIQUE:  Portable AP upright      FINDINGS:  ICD is present in the left upper chest wall with unchanged right  atrial and right ventricular electrode wire positions. Sternotomy  wires appear unchanged.      Cardiac silhouette appears upper normal to mildly enlarged. Opacity  is noted mid and lower left lung, new since the prior study and  suspicious for pneumonia. No pleural effusion is noted on the left.  Right costophrenic angle is blunted which may indicate minimal right  pleural fluid. Osseous structures show no acute interval change..      Impression: Infiltrate mid and lower left lung suspicious for pneumonia      Probable minimal right pleural fluid      MACRO:  None.      Signed by: Wendi Hu 12/18/2023 12:06 PM  Dictation workstation:   [id number]      Physical Exam  Patient is awake and orient, not in apparent distress  Eyes: PERRLA, no conjunctival congestion  Chest: Bilateral Air entry, no crackles or wheezing  Heart: s1S2 regular, no murmur  Abdomen: Soft, non tender, BS present  Ext: Bilateral pedal edema  Relevant Results               Assessment/Plan   This patient currently has cardiac telemetry  ordered; if you would like to modify or discontinue the telemetry order, click here to go to the orders activity to modify/discontinue the order.     1.)  Acute on chronic combined systolic and diastolic congestive heart failure  -Continue on Lasix IV push 100 mg twice a day as well as metolazone  -Monitor renal function closely with diuresis   -Monitor electrolytes and replenish generously as needed   -Strict I&O’s   -2g salt restriction, 2L fluid restriction   -Monitor fluid status closely   -Cardiology consultation, appreciate further recommendations   -Follow-up repeat echocardiogram     2.) BRUNO on CKD, possibly secondary to cardiorenal syndrome  -Baseline serum creatinine: ~2.3-2.6  -Creatinine at admission: 3.00 with BUN 90 (increase > 0.3mg/dL from baseline)  -Follow UOP  -Recheck renal function panel in AM. Can check urine studies if continuing to worsen.   -Nephrology consultation, appreciate further recommendations      3.) Type II MI / Demand ischemia   -Baseline troponin leak, but slightly elevated from baseline   -Suspect multifactorial in setting of #1, #2   -No CP or anginal symptoms presently   -Continued on home therapies. Tele. Echo update.   -Cardiology following as above      4.) Paroxysmal atrial fibrillation/flutter s/p DCCV  -No current AC d/t hx GIB  -Continue home therapies  -Tele and electrolyte monitoring      5.) s/p PPM   -Tele  -Device clinic as scheduled      6.) HTN --> intermittent HoTN, DLD, CAD s/p remote CABGx3 and PCI to RCA  -Continued on home therapies   -Close BP monitoring, currently trending softer end of normal      7.) wtTTR amyloid  -Continue home meds  -Cardiology following as above      8.) COPD  -Not in acute exacerbation   -Continued on home therapies      9.) Rectal CA s/p proctocolectomy 2018 (with colostomy) and recurrent GI bleeds (following with GI)  -No evidence of bleeding acutely  -Continue home therapies  -Continue outpatient follow-up as scheduled     10.   Anemia  Secondary to chronic GI loss, no apparent bleeding  Will transfuse 1 unit of PRBC  Monitor H&H and transfuse to maintain hemoglobin of more than 7     CODE STATUS: DNR/DNI (ACP document placed in chart, has [place] DNR-CCA form as well), confirmed on admission      DVT Prophylaxis: SCDs, no AC d/t hx bleeds                 Taylor Hernández MD

## 2023-12-19 NOTE — CONSULTS
Nephrology Consult Note                                                                                                                                         Inpatient consult to Nephrology  Consult performed by: Doreen Serrano DO  Consult ordered by: Taylor Hernández MD                                                                                                               HPI  Patient is a 83 y.o. male with history of coronary artery disease, cardiac amyloidosis, CKD who is admitted to hospital with complaints of   shortness of breath and fluid retention.  Patient was sent in by heart failure clinic.  The patient has gained he thinks 20 pounds.  Nephrology consulted in view of BRUNO and CKD.  Patient is known to us from previous hospitalizations and from the office.  Baseline creatinine is around 2.0-2.6.  Admit creatinine 3.0.  He did receive IV Lasix 100 mg twice daily.  He has some urine output but not a real diuresis so far.  Complains of lower extremity swelling, orthopnea.    Past Medical History:   Diagnosis Date    Anemia     Arrhythmia     Atherosclerosis of coronary artery bypass graft(s) without angina pectoris 07/15/2016    Atherosclerosis of coronary artery bypass graft(s) without angina pectoris    Atherosclerosis of coronary artery bypass graft(s), unspecified, with other forms of angina pectoris (CMS/[facility]) 04/22/2019    Coronary artery disease of bypass graft of native heart with stable angina pectoris    Benign prostatic hyperplasia without lower urinary tract symptoms     BPH (benign prostatic hyperplasia)    CHF (congestive heart failure) (CMS/[facility])     Hyperlipidemia     Hypertension     Other chest pain 04/01/2016    Chest discomfort    Other chest pain 09/17/2019    Atypical chest pain    Other chest pain 05/11/2018    Atypical chest pain    Peripheral  vascular disease (CMS/HCC)     Personal history of other diseases of the circulatory system 04/01/2016    History of angina pectoris    Personal history of other diseases of the digestive system     History of ulcerative colitis    Personal history of other diseases of the musculoskeletal system and connective tissue     History of osteoarthritis    Personal history of other diseases of the musculoskeletal system and connective tissue 12/07/2018    History of chronic back pain    Personal history of other diseases of urinary system 06/21/2019    History of hematuria    Personal history of other specified conditions 04/01/2016    History of chest pain    Personal history of other specified conditions 07/15/2016    History of precordial chest pain    Sleep apnea       Social History     Socioeconomic History    Marital status:      Spouse name: Not on file    Number of children: Not on file    Years of education: Not on file    Highest education level: Not on file   Occupational History    Not on file   Tobacco Use    Smoking status: Former     Types: Cigarettes    Smokeless tobacco: Never   Vaping Use    Vaping Use: Never used   Substance and Sexual Activity    Alcohol use: Not Currently    Drug use: Never    Sexual activity: Not on file   Other Topics Concern    Not on file   Social History Narrative    Not on file     Social Determinants of Health     Financial Resource Strain: Low Risk  (12/19/2023)    Overall Financial Resource Strain (CARDIA)     Difficulty of Paying Living Expenses: Not hard at all   Food Insecurity: No Food Insecurity (12/12/2023)    Hunger Vital Sign     Worried About Running Out of Food in the Last Year: Never true     Ran Out of Food in the Last Year: Never true   Transportation Needs: No Transportation Needs (12/19/2023)    PRAPARE - Transportation     Lack of Transportation (Medical): No     Lack of Transportation (Non-Medical): No   Physical Activity: Not on file   Stress: Not on  file   Social Connections: Not on file   Intimate Partner Violence: Not on file   Housing Stability: Low Risk  (12/19/2023)    Housing Stability Vital Sign     Unable to Pay for Housing in the Last Year: No     Number of Places Lived in the Last Year: 1     Unstable Housing in the Last Year: No      Family History   Problem Relation Name Age of Onset    Other (Cardiac disorder) Other Sibling       No current facility-administered medications on file prior to encounter.     Current Outpatient Medications on File Prior to Encounter   Medication Sig Dispense Refill    amiodarone (Pacerone) 200 mg tablet Take 1 tablet (200 mg) by mouth once daily.      aspirin 81 mg EC tablet Take 1 tablet (81 mg) by mouth once daily.      diphenoxylate-atropine (Lomotil) 2.5-0.025 mg tablet Take 2 tablets by mouth 4 times a day as needed for diarrhea.      empagliflozin (Jardiance) 25 mg Take 1 tablet (25 mg) by mouth once daily.      eplerenone (Inspra) 25 mg tablet Take 2 tablets (50 mg) by mouth once daily.      furosemide (Furoscix) 80 mg/10 mL kit Inject 80 mg under the skin once daily. For one week. Then once weekly after that (Patient not taking: Reported on 12/18/2023) 12 kit 0    levothyroxine (Synthroid, Levoxyl) 25 mcg tablet Take 1 tablet (25 mcg) by mouth once daily.      molnupiravir (Lagevrio, EUA,) 200 mg capsule 1 capsule (200 mg) every 12 hours.      pantoprazole (ProtoNix) 40 mg EC tablet Take 1 tablet (40 mg) by mouth once daily. Do not crush, chew, or split. 30 tablet 0    rosuvastatin (Crestor) 5 mg tablet Take 1 tablet (5 mg) by mouth once daily. 30 tablet 11    sucralfate (Carafate) 1 gram tablet Take 1 tablet (1 g) by mouth 4 times a day for 14 days. Take 1 hour before meals and at bedtime 56 tablet 0    tafamidis (Vyndamax) 61 mg capsule Take 1 capsule (61 mg) by mouth once daily. 90 capsule 3    tamsulosin (Flomax) 0.4 mg 24 hr capsule Take 1 capsule (0.4 mg) by mouth once daily.      torsemide (Demadex) 100  "mg tablet Take 0.5 tablets (50 mg) by mouth 2 times a day. (Patient taking differently: Take 1 tablet (100 mg) by mouth once daily.) 30 tablet 1    traMADol (Ultram) 50 mg tablet Take 1 tablet (50 mg) by mouth once daily as needed.      [DISCONTINUED] potassium chloride CR 20 mEq ER tablet Take 1 tablet (20 mEq) by mouth once daily. Do not crush or chew.        Scheduled medications  amiodarone, 200 mg, oral, Daily  [Held by provider] eplerenone, 50 mg, oral, Daily  furosemide, 100 mg, intravenous, BID  levothyroxine, 25 mcg, oral, Daily  melatonin, 3 mg, oral, Daily  metOLazone, 5 mg, oral, Daily  pantoprazole, 40 mg, oral, Daily before breakfast   Or  pantoprazole, 40 mg, intravenous, Daily before breakfast  polyethylene glycol, 17 g, oral, Daily  rosuvastatin, 5 mg, oral, Daily  sucralfate, 1 g, oral, 4x daily  tafamidis, 61 mg, oral, Daily      Continuous medications     PRN medications  PRN medications: acetaminophen, bisacodyl, guaiFENesin, promethazine **OR** promethazine, traMADol     Review of systems  as per HPI otherwise 10 point review systems negative    /57   Pulse 71   Temp 35.9 °C (96.7 °F)   Resp 16   Ht 1.702 m (5' 7\")   Wt 90.5 kg (199 lb 8.3 oz)   SpO2 99%   BMI 31.25 kg/m²     Input / Output:  24 HR:   Intake/Output Summary (Last 24 hours) at 12/19/2023 1609  Last data filed at 12/19/2023 1521  Gross per 24 hour   Intake 250 ml   Output 2375 ml   Net -2125 ml       Physical Exam   Alert and oriented x 3, NAD  EOMI  Neck: supple, JVD to jaw  CV: RRR without m/r/g  Lungs: Few rales at the bases  Abd: soft NT/ND +BS  Ext: 2-3+ to thighs lower extremity edema   Scrotal edema  Neuro: grossly intact  Skin: no rashes    Results from last 7 days   Lab Units 12/19/23  0527 12/18/23  0844 12/14/23  1000   SODIUM mmol/L 131* 129* 129*   POTASSIUM mmol/L 4.1 5.1 5.4*   CHLORIDE mmol/L 96* 94* 95*   CO2 mmol/L 25 25 25   BUN mg/dL 87* 90* 82*   CREATININE mg/dL 2.92* 3.00* 2.68*   GLUCOSE mg/dL " 83 86 88   CALCIUM mg/dL 8.0* 8.7 8.4*        Results from last 7 days   Lab Units 12/19/23  0527   SODIUM mmol/L 131*   POTASSIUM mmol/L 4.1   CHLORIDE mmol/L 96*   CO2 mmol/L 25   BUN mg/dL 87*   CREATININE mg/dL 2.92*   CALCIUM mg/dL 8.0*   GLUCOSE mg/dL 83      Results from last 7 days   Lab Units 12/19/23  0527 12/18/23  0844 12/14/23  1000   MAGNESIUM mg/dL 2.35 2.46* 2.38      Results from last 7 days   Lab Units 12/19/23  0527 12/18/23  1025   WBC AUTO x10*3/uL 3.3* 4.4   HEMOGLOBIN g/dL 6.9* 7.8*   HEMATOCRIT % 21.8* 24.9*   PLATELETS AUTO x10*3/uL 128* 140*        XR chest 1 view   Final Result   Infiltrate mid and lower left lung suspicious for pneumonia        Probable minimal right pleural fluid        MACRO:   None.        Signed by: Wendi Hu 12/18/2023 12:06 PM   Dictation workstation:   [id number]           Assessment:   Patient is 83 y.o. male who is admitted to hospital for SOB . Nephrology consulted in view of BRUNO on CKD.    BRUNO on CKD stage IIIb  -Baseline creatinine 2-2.6    Acute on chronic systolic and diastolic heart failure  Amyloid cardiomyopathy    Hyponatremia  -Hypervolemic in setting of BRUNO    Recommendations:   -Start Lasix drip  -No need for RRT at this time however patient is a poor chronic dialysis candidate  -Noted plans for palliative care involvement      Please message me through [employer] chat with any questions or concerns.     Doreen Serrano DO  12/19/2023  4:09 PM         [facility]    [address], [address]   [place] [address]  Office: [phone]

## 2023-12-19 NOTE — PROGRESS NOTES
Physical Therapy    Physical Therapy Evaluation    Patient Name: Michael Duran  MRN: [id number]  Today's Date: 12/19/2023   Time Calculation  Start Time: 1005  Stop Time: 1015  Time Calculation (min): 10 min    Assessment/Plan   PT Assessment  PT Assessment Results: Decreased strength, Decreased endurance, Impaired balance, Decreased safety awareness, Impaired hearing  Rehab Prognosis: Fair  Evaluation/Treatment Tolerance: Patient limited by fatigue  End of Session Communication: Bedside nurse  Assessment Comment:  (DECRESED AMB BALCNE AND ENDURANCE WOULD BENEFIT FORM LOW REHAB ON DISCH)  End of Session Patient Position: Bed, 3 rail up, Alarm on (CALL LIGHT IN REACH)  IP OR SWING BED PT PLAN  Inpatient or Swing Bed: Inpatient  PT Plan  Treatment/Interventions: Transfer training, Gait training, Stair training, Endurance training, Strengthening  PT Plan: Skilled PT  PT Frequency: 3 times per week  PT Discharge Recommendations: Low intensity level of continued care  Equipment Recommended upon Discharge:  (TBD)  PT Recommended Transfer Status: Assist x1 (FWW)  PT - OK to Discharge: Yes (TO NEXT LOC WHEN MEDICALLY STABLE)      Subjective   General Visit Information:  General  Reason for Referral: IMPAIRED MOBILITY  Referred By: KOBAK ARCHIE PAC  Past Medical History Relevant to Rehab: SOB; DX: CHF, LE EDEMA; HX: CAD, CABG, PCI, PACER, COPD, RECTAL CA/SURG  Co-Treatment: OT  Co-Treatment Reason: MOBILTY ENDURANCE  Prior to Session Communication: Bedside nurse  Patient Position Received: Bed, 3 rail up, Alarm on (ALERT IV, ROOM 2029)  Home Living:  Home Living  Home Living Comments:  (ALONE, HOUSE, 3 STEPS, AMB FWW PRN FOR STABILTIY, WALKIN SHOWER STANDS TO BATHE, INDEP ADL, DOES CHORES, FAMIY DOES GROCEERY SHOPPING AND TAKES TO APPTS)     Precautions:  Precautions  Hearing/Visual Limitations: [place]  Medical Precautions: Fall precautions       Objective   Pain:  Pain Assessment  Pain Score: 0 - No  pain  Cognition:  Cognition  Overall Cognitive Status: Within Functional Limits  Safety/Judgement: Exceptions to WFL  Novel Situations: Minimal  Insight: Mild  Impulsive: Mildly                  Activity Tolerance  Endurance: Endurance does not limit participation in activity    Sensation  Sensation Comment: NO C/O    Strength  Strength Comments: ROM LEGS WFL, STRENGTH LEGS 3+/5 DECREASED MUSCULAR ENDURANCE  Strength  Strength Comments: ROM LEGS WFL, STRENGTH LEGS 3+/5 DECREASED MUSCULAR ENDURANCE                     Static Sitting Balance  Static Sitting-Comment/Number of Minutes: GOOD  Dynamic Sitting Balance  Dynamic Sitting-Comments: FAIR    Static Standing Balance  Static Standing-Comment/Number of Minutes: FAIR/FAIR-  Dynamic Standing Balance  Dynamic Standing-Comments: FAIR/FAIR-  Functional Assessments:  Bed Mobility  Bed Mobility:  (SBA SUPINE<>SIT SITS EOB INDEP)    Transfers  Transfer:  (SIT<>STAND FWW SBA AND VC FOR FWW SAFETY (PULLS TO STAND ADN PLOPS TO SIT))    Ambulation/Gait Training  Ambulation/Gait Training Performed:  (FWW  FT,  1 LOB DURING TURN TO RETURN TO ROOM, MIN A TO CORRECT, FOOT FLAT GAIT, MILD SOB W/ GAIT)  Extremity/Trunk Assessments:                      Outcome Measures:  [facility] Basic Mobility  Turning from your back to your side while in a flat bed without using bedrails: None  Moving from lying on your back to sitting on the side of a flat bed without using bedrails: None  Moving to and from bed to chair (including a wheelchair): A little  Standing up from a chair using your arms (e.g. wheelchair or bedside chair): A little  To walk in hospital room: A little  Climbing 3-5 steps with railing: A lot  Basic Mobility - Total Score: 19    Encounter Problems       Encounter Problems (Active)       Mobility       STG - Patient will ambulate (Not Progressing)       Start:  12/19/23    Expected End:  12/29/23       FWW  FT          STG - Patient will ascend and descend four to  six stairs (Not Progressing)       Start:  12/19/23    Expected End:  12/29/23       USING RAIL FOR SAFE HOME ENTRY SBA         STRENGTHEING AND BALANCE (Not Progressing)       Start:  12/19/23    Expected End:  01/02/24       20+ REPS RROM INCREASING STRENGTH AND BALANCE ACTIVITIES TO STABILIZE GAIT            Pain - Adult              Education Documentation  Mobility Training, taught by Ene Villegas, PT at 12/19/2023  1:08 PM.  Learner: Patient  Readiness: Acceptance  Method: Explanation  Response: Needs Reinforcement  Comment: FWW SAFETY    Education Comments  No comments found.

## 2023-12-19 NOTE — PROGRESS NOTES
PROGRESS NOTE    HPI:  Michael Duran is a 83 y.o. male presenting with worsening ankle swelling, currently up to the groin and scrotum, failing outpatient diuretic therapy.  He has history of coronary artery disease, status post bypass surgery several years ago, PCI to left circumflex artery in 2014, and more recently in 5/4/18 for chronic stable angina, inability to tolerate medicines. He received a 2.5 mm resolute Robin drug-eluting stent to left circumflex. He had a preserved LV function, previously, now around 45%.    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   Somewhere is 2019/2020, he developed atrial flutter with slow ventricular response. He was asymptomatic initially, we still arranged a stress test for chronotropic response initially this was normal. The he slowly became symptomatic with shortness of breath. He was referred to Dr. Reynoso who performed a DC cardioversion, hoping that heart rate would improve after restoration of sinus rhythm. After the cardioversion he came in for an urgent evaluation. He was not feeling well, in fact worse after the cardioversion more short of breath with dizziness. His EKG showed sinus rhythm with prolonged first-degree AV delay occasional PVCs and IVCD. Heart rate is 65 bpm. Subsequently we detected Mobitz type I blockage. At that time given the symptoms of dizziness shortness of breath on exertion we decided to implant a pacemaker. He then started developing some heart failure symptoms. Because of recurrent atrial fibrillation he was started on amiodarone. He was having lot of dizziness/balance problems-after multiple investigations we concluded that his balance problems are due to vertigo. Currently this is slowly improving with physical therapy.  A more recent echo in spring 2022 showed that LVEF has declined to 45%. 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 In  April 2022 it came to our attention on a routine pacemaker check that he had a sustained but slow ventricular tachycardia with a heart rate around 150 bpm. He was not admitted to the hospital with wide-complex tachycardia but turned out this was atrial tachycardia with tracking. Tracking was turned off. He was discharged home. At that point we decided to initiate evaluation for cardiac amyloidosis. Blood work and urine analysis was negative but PYP scan was strongly suggestive of ATTR amyloidosis. However an MRI that was concomitantly ordered by Dr. Stout does not support the diagnosis.  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    Most recently he has had multiple hospital admissions with GI bleed and severe anemia.  Status post multiple transfusions GI evaluation and subsequently oral anticoagulant therapy excepting aspirin was stopped.  He has follow-up with GI as well as with interventional team for Watchman device placement.     He states that shortness of breath is at baseline.  Going regularly to the CHF clinic.  Reviewed lab work that reveals pancytopenia.     His ECG demonstrated atrial ventricular paced rhythm.     Subjective Data:  Patient resting comfortably.  Laying flat in bed without shortness of breath.  His biggest issue is fluid in the abdomen, groin and and legs.  States that he has gained about 20 pounds of water weight.  Is on high-dose IV Lasix at 100 mg twice daily.  Telemetry shows AV paced rhythm.  Patient's hemoglobin has trended down to 6.9 and hemoglobin is 2.92.      Overnight Events:    None     Objective Data:  Last Recorded Vitals:  Vitals:    12/19/23 0400 12/19/23 0530 12/19/23 0548 12/19/23 0815   BP: 103/62  113/69 103/59   BP Location:   Left arm Left arm   Patient Position:   Lying Lying   Pulse: 69 70 71 69   Resp: 10 10 18    Temp:   36.2 °C (97.2 °F) 36 °C (96.8 °F)   TempSrc:   Temporal Temporal   SpO2: 94% 96% 94% 98%   Weight:   90.5  kg (199 lb 8.3 oz)    Height:           Last Labs:  CBC - 12/19/2023:  5:27 AM  3.3 6.9 128    21.8      CMP - 12/19/2023:  5:27 AM  8.0 7.2 55 --- 0.7   4.1 3.3 26 138      PTT - 10/26/2023: 12:26 PM  1.2   13.1 32     Last I/O:  I/O last 3 completed shifts:  In: - (0 mL/kg)   Out: 575 (6.4 mL/kg) [Urine:375 (0.1 mL/kg/hr); Stool:200]  Weight: 90.5 kg     Past Cardiology Tests (Last 3 Years):  Echo: 4/23--CONCLUSIONS:  1. Left ventricular systolic function is mildly decreased with a 45% estimated ejection fraction.  2. Spectral Doppler shows a restrictive pattern of left ventricular diastolic filling.  3. There is severe concentric left ventricular hypertrophy.  4. There is low normal right ventricular systolic function.  5. The left atrium is severely dilated.  6. The right atrium is moderately to severely dilated.  7. Mild to moderate mitral valve regurgitation.  8. Moderate tricuspid regurgitation.  9. Moderately elevated right ventricular systolic pressure.  10. There is global hypokinesis of the left ventricle with minor regional variations.  No results found for this or any previous visit from the past 1095 days.    Stress Test:  Nuclear Stress Test 06/16/2022    Cardiac Imaging:  MR cardiac morphology and function w and wo IV contrast 10/03/2022      Inpatient Medications:  Scheduled medications   Medication Dose Route Frequency    amiodarone  200 mg oral Daily    [Held by provider] eplerenone  50 mg oral Daily    furosemide  100 mg intravenous BID    levothyroxine  25 mcg oral Daily    melatonin  3 mg oral Daily    metOLazone  5 mg oral Daily    pantoprazole  40 mg oral Daily before breakfast    Or    pantoprazole  40 mg intravenous Daily before breakfast    polyethylene glycol  17 g oral Daily    rosuvastatin  5 mg oral Daily    sucralfate  1 g oral 4x daily    tafamidis  61 mg oral Daily       Review of Systems   Constitutional: Negative for fever and malaise/fatigue.   Cardiovascular:  Negative for chest  pain, orthopnea and palpitations.        Ongoing complaints of abdominal fluid, groin fluid and significant LE edema   Respiratory:  Negative for shortness of breath and wheezing.    Skin:  Negative for itching and rash.   Gastrointestinal:  Negative for abdominal pain, diarrhea, nausea and vomiting.   Genitourinary:  Negative for dysuria.   Neurological:  Negative for weakness.        Physical Exam  Constitutional:       General: He is not in acute distress.  HENT:      Mouth/Throat:      Mouth: Mucous membranes are moist.   Neck:      Comments: Positive HJR  Cardiovascular:      Rate and Rhythm: Normal rate and regular rhythm.      Heart sounds: Normal heart sounds. No murmur heard.  Pulmonary:      Effort: Pulmonary effort is normal.      Breath sounds: Normal breath sounds.      Comments: Clear anteriorly  Abdominal:      General: Abdomen is flat. Bowel sounds are normal.      Palpations: Abdomen is soft.   Musculoskeletal:         General: No swelling.      Right lower leg: Edema present.      Left lower leg: Edema present.      Comments: Some scrotal edema   Skin:     General: Skin is warm and dry.   Neurological:      Mental Status: He is alert and oriented to person, place, and time.   Psychiatric:         Mood and Affect: Mood normal.          ASSESSMENT/PLAN  1-acute on chronic systolic and diastolic heart failure-repeat echo, limited for LV function.  Patient has amyloid cardiomyopathy.  Continue IV diuretics plan on adding metolazone for symptom control and continue metolazone outpatient 2-3 times a week and close outpatient follow-up in CHF clinic for electrolyte monitoring.  12-19-23: Patient still with concerns of abdominal/groin and lower extremity edema.  Again he had gained 20 pounds prior to his admission.  Continue on high-dose IV Lasix.  If patient not starting to diurese even with the metolazone may need to consider Lasix infusion.     2-atrial fibrillation-maintaining sinus rhythm with  amiodarone.  12-19-23: AV paced rhythm on telemetry.  Denies any palpitations.  He is not on anticoagulation due to history of recurrent bleeding.  May need transfused.          3-coronary artery disease-stable continue outpatient therapy.  12-19-23: No chest pain or angina.  Blood pressures are well-controlled.  Recommendations--continue conservative management with high-dose Lasix however were not starting to see increase in his diuresis may need Lasix infusion.  As per notes from CHF clinic and the nurse practitioner patient might be nearing the point of palliative care/hospice consideration.        Milton Ricardo PA-C  12/19/2023  9:56 AM

## 2023-12-20 NOTE — CARE PLAN
The patient's goals for the shift include      The clinical goals for the shift include Pt will rermain free of falls.    Over the shift, the patient did not make progress toward the following goals. Barriers to progression include Pt is impulsive at times. Recommendations to address these barriers include frequent rounding and use of a bed alarm.

## 2023-12-20 NOTE — PROGRESS NOTES
"      Nephrology Progress Note      Nephrology following for BRUNO on CKD.   Events over night:   Did not get PRBC  Good diuresis on lasix drip      /55 (BP Location: Right arm, Patient Position: Lying)   Pulse 72   Temp 36 °C (96.8 °F) (Temporal)   Resp 19   Ht 1.702 m (5' 7\")   Wt 90.3 kg (199 lb 1.2 oz)   SpO2 95%   BMI 31.18 kg/m²     Input / Output:  24 HR:   Intake/Output Summary (Last 24 hours) at 12/20/2023 1048  Last data filed at 12/20/2023 0654  Gross per 24 hour   Intake 755.6 ml   Output 4375 ml   Net -3619.4 ml       Physical Exam   Alert and oriented x 3 NAD  Neck: + JVD  CV: RRR  Lungs: CTA bilaterally  Abd: soft, NT, ND   Ext: 3+ lower extremity edema    Scheduled medications  amiodarone, 200 mg, oral, Daily  [Held by provider] eplerenone, 50 mg, oral, Daily  levothyroxine, 25 mcg, oral, Daily  melatonin, 3 mg, oral, Daily  metOLazone, 5 mg, oral, Daily  pantoprazole, 40 mg, oral, Daily before breakfast   Or  pantoprazole, 40 mg, intravenous, Daily before breakfast  polyethylene glycol, 17 g, oral, Daily  rosuvastatin, 5 mg, oral, Daily  sucralfate, 1 g, oral, 4x daily  tafamidis, 61 mg, oral, Daily      Continuous medications  furosemide, 20 mg/hr, Last Rate: 20 mg/hr (12/20/23 0203)      PRN medications  PRN medications: acetaminophen, bisacodyl, guaiFENesin, promethazine **OR** promethazine, traMADol   Results from last 7 days   Lab Units 12/20/23  0329   SODIUM mmol/L 132*   POTASSIUM mmol/L 3.0*   CHLORIDE mmol/L 92*   CO2 mmol/L 28   BUN mg/dL 84*   CREATININE mg/dL 2.67*   CALCIUM mg/dL 8.2*   GLUCOSE mg/dL 90      Results from last 7 days   Lab Units 12/20/23  0329 12/19/23  0527 12/18/23  0844   MAGNESIUM mg/dL 2.20 2.35 2.46*      Results from last 7 days   Lab Units 12/20/23  0329 12/19/23  0527 12/18/23  1025   WBC AUTO x10*3/uL 3.6* 3.3* 4.4   HEMOGLOBIN g/dL 6.8* 6.9* 7.8*   HEMATOCRIT % 21.7* 21.8* 24.9*   PLATELETS AUTO x10*3/uL 133* 128* 140*        Assessment & Plan: "     Patient is 83 y.o. male who is admitted to hospital for SOB . Nephrology consulted in view of BRUNO on CKD.     BRUNO on CKD stage IIIb  -Baseline creatinine 2-2.6  -stable on Lasix drip started on 12/19     Acute on chronic systolic and diastolic heart failure  Amyloid cardiomyopathy     Hyponatremia  -Hypervolemic in setting of BRUNO     Recommendations:   -cont. Lasix drip, good response so far   -No need for RRT at this time however patient is a poor chronic dialysis candidate  -Noted plans for palliative care involvement     Please message me through [facility] chat with any questions or concerns.     Doreen Serrano DO  12/20/2023  10:48 AM     [facility]    [address], [address]   [place] [address]  Office: [phone]

## 2023-12-20 NOTE — PROGRESS NOTES
Michael Duran is a 83 y.o. male on day 1 of admission presenting with Heart failure, acute on chronic, systolic and diastolic (CMS/HCC).      Subjective   Michael Duran is a 83 y.o. 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 Patient reports he is had recurrent bouts of weight gain and required admission multiple times for IV diuresis, noting most recently he has been going to the heart failure clinic and described continued lower extremity edema all the way up into his scrotum. He trialed IV Lasix in the outpatient setting and lost 3 pounds, but did subsequently regain this and more. Believes he has gained a total of 25-30 pounds. He has also noticed some very mild increased shortness of breath on exertion from his baseline; denies chest pain/pressure, dizziness or lightheadedness, diaphoresis, syncope or presyncope, fevers, chills, nausea, vomiting, abdominal pain. Has noticed decreased urine output. He reports compliance with all of his medications and with dietary modifications.     ED Course (Summary):   Vitals on presentation: T97.8, /62, HR 79, RR 18, SpO2 95% RA  Labs: CBC with WBC 4.4, Hgb 7.8, platelets 140.  Chemistry panel with glucose 86, sodium 129 potassium 5.1, BUN 90, serum creatinine 3.00, mag 2.46.  .  High-sensitivity troponin 143-136.  ECG paced, similar in comparison in the system to last few per quick review  Patient was given one-time dose 40 mg IV Lasix    12/19: Patient was evaluated this morning, feeling better, shortness of breath is improving, leg edema getting better.  Denies  apparent bleeding  12/20: Patient was seen and examined, shortness of breath and leg edema improving, no fever or chills, no apparent bleeding.  Diuresis switched to Lasix drip at 20 Mg per hour     Objective     Last Recorded Vitals  BP 94/55   Pulse 71   Temp 36.2 °C (97.2 °F) (Temporal)   Resp 16   Wt 90.3 kg (199 lb 1.2 oz)   SpO2 97%   Intake/Output last 3 Shifts:    Intake/Output Summary (Last 24 hours) at 12/20/2023 1344  Last data filed at 12/20/2023 0654  Gross per 24 hour   Intake 755.6 ml   Output 3675 ml   Net -2919.4 ml         Admission Weight  Weight: 85.7 kg (189 lb) (12/18/23 1006)    Daily Weight  12/20/23 : 90.3 kg (199 lb 1.2 oz)    Image Results  Transthoracic Echo (TTE) [place] [address]       Phone [phone] Fax [phone]    TRANSTHORACIC ECHOCARDIOGRAM REPORT       Patient Name:     LUIS CARLOS Linda Physician:  [id number] Connor Florence MD  Study Date:       12/19/2023         Ordering Provider:  [id number] IRENE GAO  MRN/PID:          [id number]           Fellow:  Accession#:       [id number]       Nurse:  Date of           1940 / 83      Sonographer:        Ernestina Landis RDCS  Birth/Age:        years  Gender:           M                  Additional Staff:  Height:           177.00 cm          Admit Date:         12/18/2023  Weight:           86.00 kg           Admission Status:   Inpatient - Routine  BSA:              2.03 m2            [facility] ICU                                       Location:  Blood Pressure: 122 /60 mmHg    Study Type:    TRANSTHORACIC ECHO (TTE) LIMITED  Diagnosis/ICD: Localized edema-R60.0  Indication:    Scrotal Edema  CPT Codes:     Echo Limited-50295; Myocardial Strain Imaging-23098   Study Detail: The following Echo studies were performed: 2D, M-Mode and Strain.       PHYSICIAN  INTERPRETATION:  Left Ventricle: Left ventricular systolic function is moderately decreased, with an estimated ejection fraction of 40-45%. Wall motion is abnormal. The left ventricular cavity size is normal. Left Ventricular Global Longitudinal Strain - -13.2 %. Spectral Doppler shows an impaired relaxation pattern of left ventricular diastolic filling. Strain values are suspicious for cardiac amyloidosis with relative apical sparing.Clinical correlation is suggested  Left Atrium: The left atrium was not assessed. Not assessed.  Right Ventricle: The right ventricle was not assessed. Right ventricular systolic function not assessed.  Right Atrium: The right atrium was not assessed.  Aortic Valve: The aortic valve was not assessed. Aortic valve regurgitation was not assessed.  Mitral Valve: The mitral valve was not assessed. Mitral valve regurgitation was not assessed.  Tricuspid Valve: The tricuspid valve was not assessed. Tricuspid regurgitation was not assessed.  Pulmonic Valve: The pulmonic valve was not assessed. The pulmonic valve regurgitation was not assessed.  Pericardium: Pericardial effusion was not assessed.  Aorta: The aortic root was not assessed.       CONCLUSIONS:   1. Left ventricular systolic function is moderately decreased with a 40-45% estimated ejection fraction.   2. Spectral Doppler shows an impaired relaxation pattern of left ventricular diastolic filling.    QUANTITATIVE DATA SUMMARY:  2D MEASUREMENTS:                            Normal Ranges:  LAs:           5.17 cm    (2.7-4.0cm)  IVSd:          1.67 cm    (0.6-1.1cm)  LVPWd:         1.68 cm    (0.6-1.1cm)  LVIDd:         4.64 cm    (3.9-5.9cm)  LVIDs:         3.63 cm  LV Mass Index: 168.1 g/m2  LV % FS        21.7 %    LV SYSTOLIC FUNCTION BY 2D PLANIMETRY (MOD):                                            Normal Ranges:  EF-A4C View:                      34.8 %  (>=55%)  EF-A2C View:                      48.7 %  EF-Biplane:                        42.2 %  Global Longitudinal Strain (GLS): -13.2 %       [id number] Connor Florence MD  Electronically signed on 12/19/2023 at 1:35:41 PM       ** Final **      Physical Exam  Patient is awake and orient, not in apparent distress  Eyes: PERRLA, no conjunctival congestion  Chest: Bilateral Air entry, no crackles or wheezing  Heart: s1S2 regular, no murmur  Abdomen: Soft, non tender, BS present  Ext: Bilateral pedal edema  Relevant Results               Assessment/Plan   This patient currently has cardiac telemetry ordered; if you would like to modify or discontinue the telemetry order, click here to go to the orders activity to modify/discontinue the order.     1.)  Acute on chronic combined systolic and diastolic congestive heart failure  -Started on Lasix IV push 100 mg twice a day as well as metolazone, switch to Lasix drip at 20 Mg per hour on 12/20  -Monitor renal function closely with diuresis   -Monitor electrolytes and replenish generously as needed   -Strict I&O’s   -2g salt restriction, 2L fluid restriction   -Monitor fluid status closely   -Cardiology consultation, appreciate further recommendations   -Follow-up repeat echocardiogram     2.) BRUNO on CKD, possibly secondary to cardiorenal syndrome  -Baseline serum creatinine: ~2.3-2.6  -Creatinine at admission: 3.00 with BUN 90 (increase > 0.3mg/dL from baseline)  -Follow UOP  -Recheck renal function panel in AM. Can check urine studies if continuing to worsen.   -Nephrology consultation, appreciate further recommendations      3.) Type II MI / Demand ischemia   -Baseline troponin leak, but slightly elevated from baseline   -Suspect multifactorial in setting of #1, #2   -No CP or anginal symptoms presently   -Continued on home therapies. Tele. Echo update.   -Cardiology following as above      4.) Paroxysmal atrial fibrillation/flutter s/p DCCV  -No current AC d/t hx GIB  -Continue home therapies  -Tele and electrolyte monitoring      5.) s/p PPM    -Tele  -Device clinic as scheduled      6.) HTN --> intermittent HoTN, DLD, CAD s/p remote CABGx3 and PCI to RCA  -Continued on home therapies   -Close BP monitoring, currently trending softer end of normal      7.) wtTTR amyloid  -Continue home meds  -Cardiology following as above      8.) COPD  -Not in acute exacerbation   -Continued on home therapies      9.) Rectal CA s/p proctocolectomy 2018 (with colostomy) and recurrent GI bleeds (following with GI)  -No evidence of bleeding acutely  -Continue home therapies  -Continue outpatient follow-up as scheduled     10.  Anemia  Secondary to chronic GI loss, no apparent bleeding  Will transfuse 1 unit of PRBC  Monitor H&H and transfuse to maintain hemoglobin of more than 7     CODE STATUS: DNR/DNI (ACP document placed in chart, has [place] DNR-CCA form as well), confirmed on admission      DVT Prophylaxis: SCDs, no AC d/t hx bleeds                 Taylor Hernández MD

## 2023-12-20 NOTE — PROGRESS NOTES
Palliative Care Follow-Up Progress Note    Michael Duran is a 83 y.o. male on day 1 of admission presenting with increasing edema, SOB, and weight gain. He is being treated for acute on chronic systolic and diastolic heart failure, BRUNO on CKD, type II MI/demand ischemia, and anemia.      12/20/2023: Joint interprofessional visit completed with Rosa Mohan PharmD. Patient seen and examined while sitting at the side of the bed, no visitors at bedside. States he is feeling better, no new complaints. Assisted patient in completing HCPOA and living will naming sister Yumi VELOZ.     Will sign off     Symptom Assessment  Symptoms currently managed Yes, per patient    Physical Exam  Constitutional:       General: He is not in acute distress.  HENT:      Head: Normocephalic.      Nose: Nose normal.      Mouth/Throat:      Mouth: Mucous membranes are moist.   Eyes:      General: No scleral icterus.  Pulmonary:      Comments: Room air, no conversational dyspnea noted  Musculoskeletal:      Cervical back: Neck supple.      Right lower leg: Edema present.      Left lower leg: Edema present.   Skin:     General: Skin is warm and dry.   Neurological:      Mental Status: He is alert and oriented to person, place, and time.   Psychiatric:         Thought Content: Thought content normal.       Plan     Follow up re: Goals of Care and Advanced Directives   Goals of care: patient states he would not want intubation/ mechanical ventilation, dialysis, or a feeding tube and made his wishes known in his living will. Ok for pressors at this time.  Advanced Directives: Assisted patient in completing HCPOA and living will     Outpatient services: provided outpatient palliative care information, patient not interested at this time    Collaborated with nephrology    I spent 20 minutes in the professional and overall care of this patient. Will sign off, please call with further questions or concerns.     Linwood Mccarthy, APRN-CNP

## 2023-12-21 NOTE — PROGRESS NOTES
PROGRESS NOTE    HPI:  Michael Duran is a 83 y.o. male presenting with worsening ankle swelling, currently up to the groin and scrotum, failing outpatient diuretic therapy.  He has history of coronary artery disease, status post bypass surgery several years ago, PCI to left circumflex artery in 2014, and more recently in 5/4/18 for chronic stable angina, inability to tolerate medicines. He received a 2.5 mm resolute Robin drug-eluting stent to left circumflex. He had a preserved LV function, previously, now around 45%.    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   Somewhere is 2019/2020, he developed atrial flutter with slow ventricular response. He was asymptomatic initially, we still arranged a stress test for chronotropic response initially this was normal. The he slowly became symptomatic with shortness of breath. He was referred to Dr. Reynoso who performed a DC cardioversion, hoping that heart rate would improve after restoration of sinus rhythm. After the cardioversion he came in for an urgent evaluation. He was not feeling well, in fact worse after the cardioversion more short of breath with dizziness. His EKG showed sinus rhythm with prolonged first-degree AV delay occasional PVCs and IVCD. Heart rate is 65 bpm. Subsequently we detected Mobitz type I blockage. At that time given the symptoms of dizziness shortness of breath on exertion we decided to implant a pacemaker. He then started developing some heart failure symptoms. Because of recurrent atrial fibrillation he was started on amiodarone. He was having lot of dizziness/balance problems-after multiple investigations we concluded that his balance problems are due to vertigo. Currently this is slowly improving with physical therapy.  A more recent echo in spring 2022 showed that LVEF has declined to 45%. 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 In  April 2022 it came to our attention on a routine pacemaker check that he had a sustained but slow ventricular tachycardia with a heart rate around 150 bpm. He was not admitted to the hospital with wide-complex tachycardia but turned out this was atrial tachycardia with tracking. Tracking was turned off. He was discharged home. At that point we decided to initiate evaluation for cardiac amyloidosis. Blood work and urine analysis was negative but PYP scan was strongly suggestive of ATTR amyloidosis. However an MRI that was concomitantly ordered by Dr. Stout does not support the diagnosis.  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    Most recently he has had multiple hospital admissions with GI bleed and severe anemia.  Status post multiple transfusions GI evaluation and subsequently oral anticoagulant therapy excepting aspirin was stopped.  He has follow-up with GI as well as with interventional team for Watchman device placement.     He states that shortness of breath is at baseline.  Going regularly to the CHF clinic.  Reviewed lab work that reveals pancytopenia.     His ECG demonstrated atrial ventricular paced rhythm.     Subjective Data:  Patient resting comfortably.  Laying flat in bed without shortness of breath.  His biggest issue is fluid in the abdomen, groin and and legs.  States that he has gained about 20 pounds of water weight.  Is on high-dose IV Lasix at 100 mg twice daily.  Telemetry shows AV paced rhythm.  Patient's hemoglobin has trended down to 6.9 and hemoglobin is 2.92.    12-20-23: Was changed to Lasix infusion by nephrology yesterday.  Patient now starting to diurese very well.  Will get bedside weight today.  Denies shortness of breath.  Telemetry shows AV paced rhythm.  12-21-23: Continues to diurese.  Remains on Lasix gtt.  Edema and dyspnea have improved.  Currently AV alternating with V pacing on tele.  Had 1 unit PRBCs yesterday. Creatinine 2.47, K  2.5.  Palliative care is following.    Overnight Events:    None     Objective Data:  Last Recorded Vitals:  Vitals:    12/20/23 2035 12/21/23 0010 12/21/23 0415 12/21/23 0750   BP: 120/62 118/53 108/63 103/50   BP Location: Right arm Right arm Left arm Left arm   Patient Position: Lying Lying Sitting Sitting   Pulse: 70 80 73 79   Resp: 16 18 18 16   Temp: 36.6 °C (97.9 °F) 36.9 °C (98.5 °F) 36.7 °C (98 °F) 36.4 °C (97.6 °F)   TempSrc: Temporal Temporal Temporal Temporal   SpO2: 95% 94% 95% 98%   Weight:   89.9 kg (198 lb 3.1 oz)    Height:           Last Labs:  Results from last 7 days   Lab Units 12/21/23  0532 12/20/23  0329 12/19/23  0527   SODIUM mmol/L 133* 132* 131*   POTASSIUM mmol/L 2.5* 3.0* 4.1   CHLORIDE mmol/L 90* 92* 96*   CO2 mmol/L 31 28 25   BUN mg/dL 79* 84* 87*   CREATININE mg/dL 2.47* 2.67* 2.92*   GLUCOSE mg/dL 81 90 83   CALCIUM mg/dL 8.5* 8.2* 8.0*     Results from last 7 days   Lab Units 12/21/23  0532   WBC AUTO x10*3/uL 3.5*   HEMOGLOBIN g/dL 8.2*   HEMATOCRIT % 25.4*   PLATELETS AUTO x10*3/uL 127*         Last I/O:  I/O last 3 completed shifts:  In: 1255.6 (14 mL/kg) [P.O.:1240; I.V.:15.6 (0.2 mL/kg)]  Out: 4900 (54.5 mL/kg) [Urine:4500 (1.4 mL/kg/hr); Stool:400]  Weight: 89.9 kg     Past Cardiology Tests (Last 3 Years):  Echo: 4/23--CONCLUSIONS:  1. Left ventricular systolic function is mildly decreased with a 45% estimated ejection fraction.  2. Spectral Doppler shows a restrictive pattern of left ventricular diastolic filling.  3. There is severe concentric left ventricular hypertrophy.  4. There is low normal right ventricular systolic function.  5. The left atrium is severely dilated.  6. The right atrium is moderately to severely dilated.  7. Mild to moderate mitral valve regurgitation.  8. Moderate tricuspid regurgitation.  9. Moderately elevated right ventricular systolic pressure.  10. There is global hypokinesis of the left ventricle with minor regional variations.  No results  found for this or any previous visit from the past 1095 days.      Inpatient Medications:  Scheduled medications   Medication Dose Route Frequency    amiodarone  200 mg oral Daily    [Held by provider] eplerenone  50 mg oral Daily    levothyroxine  25 mcg oral Daily    melatonin  3 mg oral Daily    metOLazone  5 mg oral Daily    pantoprazole  40 mg oral Daily before breakfast    Or    pantoprazole  40 mg intravenous Daily before breakfast    polyethylene glycol  17 g oral Daily    potassium chloride  20 mEq intravenous q2h    potassium chloride CR  40 mEq oral q6h    rosuvastatin  5 mg oral Daily    sucralfate  1 g oral 4x daily    tafamidis  61 mg oral Daily       Review of Systems   Constitutional: Negative for fever and malaise/fatigue.   Cardiovascular:  Positive for leg swelling (Improving.). Negative for chest pain, orthopnea and palpitations.   Respiratory:  Negative for shortness of breath and wheezing.    Skin:  Negative for itching and rash.   Gastrointestinal:  Negative for abdominal pain, diarrhea, nausea and vomiting.   Genitourinary:  Negative for dysuria.   Neurological:  Negative for weakness.        Physical Exam  Constitutional:       General: He is not in acute distress.  HENT:      Mouth/Throat:      Mouth: Mucous membranes are moist.   Cardiovascular:      Rate and Rhythm: Normal rate and regular rhythm.      Heart sounds: Normal heart sounds. No murmur heard.  Pulmonary:      Effort: Pulmonary effort is normal.      Breath sounds: Normal breath sounds.   Abdominal:      General: Abdomen is flat. Bowel sounds are normal.      Palpations: Abdomen is soft.   Musculoskeletal:         General: No swelling.      Right lower leg: Edema (trace) present.      Left lower leg: Edema (trace) present.      Comments: Some scrotal edema   Skin:     General: Skin is warm and dry.   Neurological:      Mental Status: He is alert and oriented to person, place, and time.   Psychiatric:         Mood and Affect:  Mood normal.          ASSESSMENT/PLAN  1-acute on chronic systolic and diastolic heart failure-repeat echo, limited for LV function.  Patient has amyloid cardiomyopathy.  Continue IV diuretics plan on adding metolazone for symptom control and continue metolazone outpatient 2-3 times a week and close outpatient follow-up in CHF clinic for electrolyte monitoring.  12-19-23: Patient still with concerns of abdominal/groin and lower extremity edema.  Again he had gained 20 pounds prior to his admission.  Continue on high-dose IV Lasix.  If patient not starting to diurese even with the metolazone may need to consider Lasix infusion.  12-20-23: Lasix infusion was initiated by nephrology and the patient is starting to diurese very well.  Will continue Lasix infusion and monitor labs closely.  Creatinine is actually improved to 2.67 today.  He has some low potassium so he will need replacement which is already ordered.  12-21-23: Remains on Lasix gtt.  Weight (standing) is 177.9.  Creatinine 2.47.  Nephro managing diuretics. May be able to transition to oral diuretics soon.  Patient would like to be home for [place].     2-atrial fibrillation-maintaining sinus rhythm with amiodarone.  12-19-23: AV paced rhythm on telemetry.  Denies any palpitations.  He is not on anticoagulation due to history of recurrent bleeding.  May need transfused.     12-20-23: Remains AV paced.  No palpitations or other new cardiac complaints.  12-21-23: AV alternating with V pacing on tele.  Not good anticoagulation candidate d/t anemia and GI bleed.      3-coronary artery disease-stable continue outpatient therapy.  12-19-23: No chest pain or angina.  Blood pressures are well-controlled.  Recommendations--continue conservative management with high-dose Lasix however were not starting to see increase in his diuresis may need Lasix infusion.  As per notes from CHF clinic and the nurse practitioner patient might be nearing the point of palliative  care/hospice consideration.  12-20-23:   fortunately no chest pain or angina.  Telemetry shows AV paced rhythm.  Continue Lasix infusion and will monitor labs and ongoing diuresis.  12-21-23: No CP/pressure/palpitations.  Medical tx.         Bonita Neff, SOFÍA-CNP  12/21/2023  8:25 AM

## 2023-12-21 NOTE — PROGRESS NOTES
Met with patient, discussed HHC if interested, patient states [facility], he moves around fine, does want any type of therapy. Dr Mann nephezra at bedside, changing patient to oral Lasix, possible DC tomorrow pending tolerance with oral lasix and potassium levels.

## 2023-12-21 NOTE — PROGRESS NOTES
"      Nephrology Progress Note      Nephrology following for BRUNO on CKD.     Events over night:   Did not get PRBC  Good diuresis on lasix drip      /50 (BP Location: Left arm, Patient Position: Sitting)   Pulse 79   Temp 36.4 °C (97.6 °F) (Temporal)   Resp 16   Ht 1.702 m (5' 7\")   Wt 80.7 kg (177 lb 14.4 oz)   SpO2 98%   BMI 27.86 kg/m²     Input / Output:  24 HR:   Intake/Output Summary (Last 24 hours) at 12/21/2023 1258  Last data filed at 12/21/2023 1029  Gross per 24 hour   Intake 840 ml   Output 2900 ml   Net -2060 ml         Physical Exam   Alert and oriented x 3 NAD  Neck: JVD improved   CV: RRR  Lungs: CTA bilaterally  Abd: soft, NT, ND   Ext: 2+ lower extremity edema    Scheduled medications  amiodarone, 200 mg, oral, Daily  [Held by provider] eplerenone, 50 mg, oral, Daily  levothyroxine, 25 mcg, oral, Daily  melatonin, 3 mg, oral, Daily  metOLazone, 5 mg, oral, Daily  pantoprazole, 40 mg, oral, Daily before breakfast   Or  pantoprazole, 40 mg, intravenous, Daily before breakfast  polyethylene glycol, 17 g, oral, Daily  potassium chloride CR, 40 mEq, oral, q6h  rosuvastatin, 5 mg, oral, Daily  sucralfate, 1 g, oral, 4x daily  tafamidis, 61 mg, oral, Daily      Continuous medications  furosemide, 20 mg/hr, Last Rate: 20 mg/hr (12/20/23 1000)      PRN medications  PRN medications: acetaminophen, bisacodyl, guaiFENesin, promethazine **OR** promethazine, traMADol   Results from last 7 days   Lab Units 12/21/23  0532   SODIUM mmol/L 133*   POTASSIUM mmol/L 2.5*   CHLORIDE mmol/L 90*   CO2 mmol/L 31   BUN mg/dL 79*   CREATININE mg/dL 2.47*   CALCIUM mg/dL 8.5*   GLUCOSE mg/dL 81        Results from last 7 days   Lab Units 12/21/23  0532 12/20/23  0329 12/19/23  0527   MAGNESIUM mg/dL 2.05 2.20 2.35        Results from last 7 days   Lab Units 12/21/23  0532 12/20/23  0329 12/19/23  0527   WBC AUTO x10*3/uL 3.5* 3.6* 3.3*   HEMOGLOBIN g/dL 8.2* 6.8* 6.9*   HEMATOCRIT % 25.4* 21.7* 21.8*   PLATELETS " AUTO x10*3/uL 127* 133* 128*          Assessment & Plan:     Patient is 83 y.o. male who is admitted to hospital for SOB . Nephrology consulted in view of BRUNO on CKD.     BRUNO on CKD stage IIIb  -Baseline creatinine 2-2.6  -stable on Lasix drip started on 12/19     Acute on chronic systolic and diastolic heart failure  Amyloid cardiomyopathy     Hyponatremia  -Hypervolemic in setting of BRUNO     Recommendations:   -Stop Lasix drip and start p.o. diuretics to see how he responds  -Replacing potassium IV and p.o.  -No need for RRT at this time however patient is a poor chronic dialysis candidate  -Thank you for the palliative care input       Please message me through [facility] chat with any questions or concerns.     Doreen Serrano DO  12/21/2023  12:58 PM     [facility]    [address] [place] [address], [address]   [place] [address]  Office: [phone]

## 2023-12-21 NOTE — CARE PLAN
The patient's goals for the shift include      The clinical goals for the shift include have no arrhythmias during electrolyte replacement      Problem: Pain - Adult  Goal: Verbalizes/displays adequate comfort level or baseline comfort level  Outcome: Progressing     Problem: Safety - Adult  Goal: Free from fall injury  Outcome: Progressing     Problem: Discharge Planning  Goal: Discharge to home or other facility with appropriate resources  Outcome: Progressing     Problem: Chronic Conditions and Co-morbidities  Goal: Patient's chronic conditions and co-morbidity symptoms are monitored and maintained or improved  Outcome: Progressing     Problem: Fall/Injury  Goal: Not fall by end of shift  Outcome: Progressing  Goal: Be free from injury by end of the shift  Outcome: Progressing  Goal: Verbalize understanding of personal risk factors for fall in the hospital  Outcome: Progressing  Goal: Verbalize understanding of risk factor reduction measures to prevent injury from fall in the home  Outcome: Progressing  Goal: Use assistive devices by end of the shift  Outcome: Progressing  Goal: Pace activities to prevent fatigue by end of the shift  Outcome: Progressing     Problem: Heart Failure  Goal: Improved gas exchange this shift  Outcome: Progressing  Goal: Improved urinary output this shift  Outcome: Progressing  Goal: Reduction in peripheral edema within 24 hours  Outcome: Progressing  Goal: Report improvement of dyspnea/breathlessness this shift  Outcome: Progressing  Goal: Weight from fluid excess reduced over 2-3 days, then stabilize  Outcome: Progressing  Goal: Increase self care and/or family involvement in 24 hours  Outcome: Progressing

## 2023-12-21 NOTE — PROGRESS NOTES
Michael Duran is a 83 y.o. male on day 2 of admission presenting with Heart failure, acute on chronic, systolic and diastolic (CMS/HCC).      Subjective   Michael Duran is a 83 y.o. 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 Patient reports he is had recurrent bouts of weight gain and required admission multiple times for IV diuresis, noting most recently he has been going to the heart failure clinic and described continued lower extremity edema all the way up into his scrotum. He trialed IV Lasix in the outpatient setting and lost 3 pounds, but did subsequently regain this and more. Believes he has gained a total of 25-30 pounds. He has also noticed some very mild increased shortness of breath on exertion from his baseline; denies chest pain/pressure, dizziness or lightheadedness, diaphoresis, syncope or presyncope, fevers, chills, nausea, vomiting, abdominal pain. Has noticed decreased urine output. He reports compliance with all of his medications and with dietary modifications.     ED Course (Summary):   Vitals on presentation: T97.8, /62, HR 79, RR 18, SpO2 95% RA  Labs: CBC with WBC 4.4, Hgb 7.8, platelets 140.  Chemistry panel with glucose 86, sodium 129 potassium 5.1, BUN 90, serum creatinine 3.00, mag 2.46.  .  High-sensitivity troponin 143-136.  ECG paced, similar in comparison in the system to last few per quick review  Patient was given one-time dose 40 mg IV Lasix    12/19: Patient was evaluated this morning, feeling better, shortness of breath is improving, leg edema getting better.  Denies  apparent bleeding  12/20: Patient was seen and examined, shortness of breath and leg edema improving, no fever or chills, no apparent bleeding.  Diuresis switched to Lasix drip at 20 Mg per hour   12/21: Patient was seen and examined, feeling a lot better, shortness of breath and leg swelling improving.  On Lasix drip-nephrology considering switching to oral today    Objective     Last Recorded Vitals  /50 (BP Location: Left arm, Patient Position: Sitting)   Pulse 79   Temp 36.4 °C (97.6 °F) (Temporal)   Resp 16   Wt 80.7 kg (177 lb 14.4 oz)   SpO2 98%   Intake/Output last 3 Shifts:    Intake/Output Summary (Last 24 hours) at 12/21/2023 1251  Last data filed at 12/21/2023 1029  Gross per 24 hour   Intake 840 ml   Output 2900 ml   Net -2060 ml         Admission Weight  Weight: 85.7 kg (189 lb) (12/18/23 1006)    Daily Weight  12/21/23 : 80.7 kg (177 lb 14.4 oz)    Image Results  Transthoracic Echo (TTE) [place] [address]       Phone [phone] Fax [phone]    TRANSTHORACIC ECHOCARDIOGRAM REPORT       Patient Name:     LUIS CARLOS Linda Physician:  [id number] Connor Florence MD  Study Date:       12/19/2023         Ordering Provider:  [id number] IRENE GAO  MRN/PID:          [id number]           Fellow:  Accession#:       [id number]       Nurse:  Date of           1940 / [birth year]      Sonographer:        Ernestina Landis RDCS  Birth/Age:        years  Gender:           M                  Additional Staff:  Height:           177.00 cm          Admit Date:         12/18/2023  Weight:           86.00 kg           Admission Status:   Inpatient - Routine  BSA:              2.03 m2            Department          [facility]                                       Location:  Blood Pressure: 122 /60 mmHg    Study Type:    TRANSTHORACIC ECHO (TTE) LIMITED  Diagnosis/ICD: Localized  edema-R60.0  Indication:    Scrotal Edema  CPT Codes:     Echo Limited-80422; Myocardial Strain Imaging-45027   Study Detail: The following Echo studies were performed: 2D, M-Mode and Strain.       PHYSICIAN INTERPRETATION:  Left Ventricle: Left ventricular systolic function is moderately decreased, with an estimated ejection fraction of 40-45%. Wall motion is abnormal. The left ventricular cavity size is normal. Left Ventricular Global Longitudinal Strain - -13.2 %. Spectral Doppler shows an impaired relaxation pattern of left ventricular diastolic filling. Strain values are suspicious for cardiac amyloidosis with relative apical sparing.Clinical correlation is suggested  Left Atrium: The left atrium was not assessed. Not assessed.  Right Ventricle: The right ventricle was not assessed. Right ventricular systolic function not assessed.  Right Atrium: The right atrium was not assessed.  Aortic Valve: The aortic valve was not assessed. Aortic valve regurgitation was not assessed.  Mitral Valve: The mitral valve was not assessed. Mitral valve regurgitation was not assessed.  Tricuspid Valve: The tricuspid valve was not assessed. Tricuspid regurgitation was not assessed.  Pulmonic Valve: The pulmonic valve was not assessed. The pulmonic valve regurgitation was not assessed.  Pericardium: Pericardial effusion was not assessed.  Aorta: The aortic root was not assessed.       CONCLUSIONS:   1. Left ventricular systolic function is moderately decreased with a 40-45% estimated ejection fraction.   2. Spectral Doppler shows an impaired relaxation pattern of left ventricular diastolic filling.    QUANTITATIVE DATA SUMMARY:  2D MEASUREMENTS:                            Normal Ranges:  LAs:           5.17 cm    (2.7-4.0cm)  IVSd:          1.67 cm    (0.6-1.1cm)  LVPWd:         1.68 cm    (0.6-1.1cm)  LVIDd:         4.64 cm    (3.9-5.9cm)  LVIDs:         3.63 cm  LV Mass Index: 168.1 g/m2  LV % FS        21.7 %    LV SYSTOLIC  FUNCTION BY 2D PLANIMETRY (MOD):                                            Normal Ranges:  EF-A4C View:                      34.8 %  (>=55%)  EF-A2C View:                      48.7 %  EF-Biplane:                       42.2 %  Global Longitudinal Strain (GLS): -13.2 %       [id number] Connor Florence MD  Electronically signed on 12/19/2023 at 1:35:41 PM       ** Final **      Physical Exam  Patient is awake and orient, not in apparent distress  Eyes: PERRLA, no conjunctival congestion  Chest: Bilateral Air entry, no crackles or wheezing  Heart: s1S2 regular, no murmur  Abdomen: Soft, non tender, BS present  Ext: Bilateral pedal edema  Relevant Results               Assessment/Plan   This patient currently has cardiac telemetry ordered; if you would like to modify or discontinue the telemetry order, click here to go to the orders activity to modify/discontinue the order.     1.)  Acute on chronic combined systolic and diastolic congestive heart failure  -Started on Lasix IV push 100 mg twice a day as well as metolazone, switch to Lasix drip at 20 Mg per hour on 12/20.  Nephrology considering switching Lasix to oral today  -Monitor renal function closely with diuresis   -Monitor electrolytes and replenish generously as needed   -Strict I&O’s   -2g salt restriction, 2L fluid restriction   -Monitor fluid status closely   -Cardiology consultation, appreciate further recommendations   -Discharge plan in a.m. if patient condition continues to improve, potassium level improve     2.) BRUNO on CKD, possibly secondary to cardiorenal syndrome  -Baseline serum creatinine: ~2.3-2.6  -Creatinine at admission: 3.00 with BUN 90 (increase > 0.3mg/dL from baseline)  -Follow UOP  -Recheck renal function panel in AM. Can check urine studies if continuing to worsen.   -Nephrology consultation, appreciate further recommendations      3.) Type II MI / Demand ischemia   -Baseline troponin leak, but slightly elevated from baseline   -Suspect  multifactorial in setting of #1, #2   -No CP or anginal symptoms presently   -Continued on home therapies. Tele. Echo update.   -Cardiology following as above      4.) Paroxysmal atrial fibrillation/flutter s/p DCCV  -No current AC d/t hx GIB  -Continue home therapies  -Tele and electrolyte monitoring      5.) s/p PPM   -Tele  -Device clinic as scheduled      6.) HTN --> intermittent HoTN, DLD, CAD s/p remote CABGx3 and PCI to RCA  -Continued on home therapies   -Close BP monitoring, currently trending softer end of normal      7.) wtTTR amyloid  -Continue home meds  -Cardiology following as above      8.) COPD  -Not in acute exacerbation   -Continued on home therapies      9.) Rectal CA s/p proctocolectomy 2018 (with colostomy) and recurrent GI bleeds (following with GI)  -No evidence of bleeding acutely  -Continue home therapies  -Continue outpatient follow-up as scheduled     10.  Anemia  Secondary to chronic GI loss, no apparent bleeding  Status post PRBC transfusion  Monitor H&H and transfuse to maintain hemoglobin of more than 7  11.  Hypokalemia-will replace IV as well as oral today, monitor electrolytes and replace as needed      CODE STATUS: DNR/DNI (ACP document placed in chart, has [place] DNR-CCA form as well), confirmed on admission      DVT Prophylaxis: SCDs, no AC d/t hx bleeds                 Taylor Hernández MD

## 2023-12-21 NOTE — CARE PLAN
The patient's goals for the shift include      The clinical goals for the shift include Pt will remain free of falls.    Over the shift, the patient did not make progress toward the following goals. Barriers to progression include generalized weakness. Recommendations to address these barriers include use of a bed alarm and frequent rounding.

## 2023-12-22 NOTE — CARE PLAN
The patient's goals for the shift include      The clinical goals for the shift include Pt will remain free of falls.    Over the shift, the patient did not make progress toward the following goals. Barriers to progression include generalized weakness. Recommendations to address these barriers include use of bed alarm and frequent rounding.

## 2023-12-22 NOTE — PROGRESS NOTES
Physical Therapy    Physical Therapy Treatment    Patient Name: Michael Duran  MRN: [id number]  Today's Date: 12/22/2023  Time Calculation  Start Time: 1324  Stop Time: 1348  Time Calculation (min): 24 min       Assessment/Plan   PT Assessment  End of Session Communication: Bedside nurse  End of Session Patient Position: Alarm on (sitting EOB)     PT Plan  Treatment/Interventions: Transfer training, Gait training, Stair training, Endurance training, Strengthening  PT Plan: Skilled PT  PT Frequency: 3 times per week  PT Discharge Recommendations: Low intensity level of continued care  Equipment Recommended upon Discharge:  (TBD)  PT Recommended Transfer Status: Assist x1 (FWW)  PT - OK to Discharge: Yes (TO NEXT LOC WHEN MEDICALLY STABLE)      General Visit Information:   PT  Visit  PT Received On: 12/22/23  General  Prior to Session Communication: Bedside nurse  Patient Position Received: Bed, 3 rail up, Alarm on    Subjective     Objective   Pain:  Pain Assessment  Pain Assessment: 0-10  Pain Score: 0 - No pain  Cognition:  Cognition  Overall Cognitive Status: Within Functional Limits      Bed Mobility  Bed Mobility: Yes  Bed Mobility 1  Bed Mobility 1: Supine to sitting  Level of Assistance 1: Close supervision    Ambulation/Gait Training  Ambulation/Gait Training Performed: Yes  Ambulation/Gait Training 1  Surface 1: Level tile  Device 1: Rolling walker  Assistance 1: Contact guard  Quality of Gait 1: Decreased step length, Forward flexed posture  Comments/Distance (ft) 1: 500 feet x 2  Transfers  Transfer: Yes  Transfer 1  Technique 1: Sit to stand  Transfer Device 1: Walker  Transfer Level of Assistance 1: Close supervision, Minimal verbal cues    Outcome Measures:  [facility] Basic Mobility  Turning from your back to your side while in a flat bed without using bedrails: A little  Moving from lying on your back to sitting on the side of a flat bed without using bedrails: A little  Moving to and from bed to chair  (including a wheelchair): A little  Standing up from a chair using your arms (e.g. wheelchair or bedside chair): A little  To walk in hospital room: A little  Climbing 3-5 steps with railing: A lot  Basic Mobility - Total Score: 17    Education Documentation  Mobility Training, taught by Fely Perez PTA at 12/22/2023  2:34 PM.  Learner: Patient  Readiness: Acceptance  Method: Explanation  Response: Needs Reinforcement    Education Comments  No comments found.        OP EDUCATION:       Encounter Problems       Encounter Problems (Active)       Mobility       STG - Patient will ambulate (Progressing)       Start:  12/19/23    Expected End:  12/29/23       FWW  FT          STG - Patient will ascend and descend four to six stairs (Progressing)       Start:  12/19/23    Expected End:  12/29/23       USING RAIL FOR SAFE HOME ENTRY SBA         STRENGTHEING AND BALANCE (Progressing)       Start:  12/19/23    Expected End:  01/02/24       20+ REPS RROM INCREASING STRENGTH AND BALANCE ACTIVITIES TO STABILIZE GAIT            Pain - Adult

## 2023-12-22 NOTE — PROGRESS NOTES
PROGRESS NOTE    HPI:  Michael Duran is a 83 y.o. male presenting with worsening ankle swelling, currently up to the groin and scrotum, failing outpatient diuretic therapy.  He has history of coronary artery disease, status post bypass surgery several years ago, PCI to left circumflex artery in 2014, and more recently in 5/4/18 for chronic stable angina, inability to tolerate medicines. He received a 2.5 mm resolute Robin drug-eluting stent to left circumflex. He had a preserved LV function, previously, now around 45%.    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   Somewhere is 2019/2020, he developed atrial flutter with slow ventricular response. He was asymptomatic initially, we still arranged a stress test for chronotropic response initially this was normal. The he slowly became symptomatic with shortness of breath. He was referred to Dr. Reynoso who performed a DC cardioversion, hoping that heart rate would improve after restoration of sinus rhythm. After the cardioversion he came in for an urgent evaluation. He was not feeling well, in fact worse after the cardioversion more short of breath with dizziness. His EKG showed sinus rhythm with prolonged first-degree AV delay occasional PVCs and IVCD. Heart rate is 65 bpm. Subsequently we detected Mobitz type I blockage. At that time given the symptoms of dizziness shortness of breath on exertion we decided to implant a pacemaker. He then started developing some heart failure symptoms. Because of recurrent atrial fibrillation he was started on amiodarone. He was having lot of dizziness/balance problems-after multiple investigations we concluded that his balance problems are due to vertigo. Currently this is slowly improving with physical therapy.  A more recent echo in spring 2022 showed that LVEF has declined to 45%. 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 In  April 2022 it came to our attention on a routine pacemaker check that he had a sustained but slow ventricular tachycardia with a heart rate around 150 bpm. He was not admitted to the hospital with wide-complex tachycardia but turned out this was atrial tachycardia with tracking. Tracking was turned off. He was discharged home. At that point we decided to initiate evaluation for cardiac amyloidosis. Blood work and urine analysis was negative but PYP scan was strongly suggestive of ATTR amyloidosis. However an MRI that was concomitantly ordered by Dr. Stout does not support the diagnosis.  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    Most recently he has had multiple hospital admissions with GI bleed and severe anemia.  Status post multiple transfusions GI evaluation and subsequently oral anticoagulant therapy excepting aspirin was stopped.  He has follow-up with GI as well as with interventional team for Watchman device placement.     He states that shortness of breath is at baseline.  Going regularly to the CHF clinic.  Reviewed lab work that reveals pancytopenia.     His ECG demonstrated atrial ventricular paced rhythm.     Subjective Data:  Patient resting comfortably.  Laying flat in bed without shortness of breath.  His biggest issue is fluid in the abdomen, groin and and legs.  States that he has gained about 20 pounds of water weight.  Is on high-dose IV Lasix at 100 mg twice daily.  Telemetry shows AV paced rhythm.  Patient's hemoglobin has trended down to 6.9 and hemoglobin is 2.92.    12-20-23: Was changed to Lasix infusion by nephrology yesterday.  Patient now starting to diurese very well.  Will get bedside weight today.  Denies shortness of breath.  Telemetry shows AV paced rhythm.  12-21-23: Continues to diurese.  Remains on Lasix gtt.  Edema and dyspnea have improved.  Currently AV alternating with V pacing on tele.  Had 1 unit PRBCs yesterday. Creatinine 2.47, K  2.5.  Palliative care is following.  12-22-23: Had good night.  No CP/palpitations/dyspnea.  Lasix gtt change to oral Torsemide per nephro.  Monitor: AV and V pacing  (alternating). Creatinine 2.32, K 2.4, H/H 7.7/23.9    Overnight Events:    None     Objective Data:  Last Recorded Vitals:  Vitals:    12/21/23 1951 12/21/23 2322 12/22/23 0320 12/22/23 0729   BP: (!) 103/48 97/56 (!) 98/47 104/55   BP Location: Right arm Right arm Right arm Left arm   Patient Position: Sitting Lying Lying Sitting   Pulse: 73 72 76 94   Resp: 18 18 18 17   Temp: 36.3 °C (97.4 °F) 36.8 °C (98.2 °F) 37.1 °C (98.7 °F) 36.7 °C (98.1 °F)   TempSrc: Temporal Temporal Temporal Temporal   SpO2: 97% 91% 93% 94%   Weight:   82.7 kg (182 lb 5.1 oz)    Height:           Last Labs:  Results from last 7 days   Lab Units 12/22/23  0332 12/21/23  0532 12/20/23  0329   SODIUM mmol/L 134* 133* 132*   POTASSIUM mmol/L 2.4* 2.5* 3.0*   CHLORIDE mmol/L 89* 90* 92*   CO2 mmol/L 35* 31 28   BUN mg/dL 78* 79* 84*   CREATININE mg/dL 2.32* 2.47* 2.67*   GLUCOSE mg/dL 83 81 90   CALCIUM mg/dL 7.9* 8.5* 8.2*       Results from last 7 days   Lab Units 12/22/23  0332   WBC AUTO x10*3/uL 3.4*   HEMOGLOBIN g/dL 7.7*   HEMATOCRIT % 23.9*   PLATELETS AUTO x10*3/uL 120*           Last I/O:  I/O last 3 completed shifts:  In: 2540 (30.7 mL/kg) [P.O.:1960; I.V.:80 (1 mL/kg); Blood:300; IV Piggyback:200]  Out: 6250 (75.6 mL/kg) [Urine:6150 (2.1 mL/kg/hr); Stool:100]  Weight: 82.7 kg     Past Cardiology Tests (Last 3 Years):  Echo: 4/23--CONCLUSIONS:  1. Left ventricular systolic function is mildly decreased with a 45% estimated ejection fraction.  2. Spectral Doppler shows a restrictive pattern of left ventricular diastolic filling.  3. There is severe concentric left ventricular hypertrophy.  4. There is low normal right ventricular systolic function.  5. The left atrium is severely dilated.  6. The right atrium is moderately to severely dilated.  7. Mild to moderate  mitral valve regurgitation.  8. Moderate tricuspid regurgitation.  9. Moderately elevated right ventricular systolic pressure.  10. There is global hypokinesis of the left ventricle with minor regional variations.  No results found for this or any previous visit from the past 1095 days.      Inpatient Medications:  Scheduled medications   Medication Dose Route Frequency    amiodarone  200 mg oral Daily    [Held by provider] eplerenone  50 mg oral Daily    levothyroxine  25 mcg oral Daily    melatonin  3 mg oral Daily    metOLazone  5 mg oral Daily    pantoprazole  40 mg oral Daily before breakfast    Or    pantoprazole  40 mg intravenous Daily before breakfast    polyethylene glycol  17 g oral Daily    potassium chloride  20 mEq intravenous q2h    rosuvastatin  5 mg oral Daily    sucralfate  1 g oral 4x daily    tafamidis  61 mg oral Daily    torsemide  100 mg oral BID       Review of Systems   Constitutional: Negative for fever and malaise/fatigue.   Cardiovascular:  Negative for chest pain, leg swelling, orthopnea and palpitations.   Respiratory:  Negative for shortness of breath and wheezing.    Skin:  Negative for itching and rash.   Gastrointestinal:  Negative for abdominal pain, diarrhea, nausea and vomiting.   Genitourinary:  Negative for dysuria.   Neurological:  Negative for weakness.        Physical Exam  Constitutional:       General: He is not in acute distress.  HENT:      Mouth/Throat:      Mouth: Mucous membranes are moist.   Cardiovascular:      Rate and Rhythm: Normal rate and regular rhythm.      Heart sounds: Normal heart sounds. No murmur heard.  Pulmonary:      Effort: Pulmonary effort is normal.      Breath sounds: Normal breath sounds.   Abdominal:      General: Abdomen is flat. Bowel sounds are normal.      Palpations: Abdomen is soft.   Musculoskeletal:         General: No swelling.      Right lower leg: No edema.      Left lower leg: No edema.   Skin:     General: Skin is warm and dry.    Neurological:      Mental Status: He is alert and oriented to person, place, and time.   Psychiatric:         Mood and Affect: Mood normal.          ASSESSMENT/PLAN  1-acute on chronic systolic and diastolic heart failure-repeat echo, limited for LV function.  Patient has amyloid cardiomyopathy.  Continue IV diuretics plan on adding metolazone for symptom control and continue metolazone outpatient 2-3 times a week and close outpatient follow-up in CHF clinic for electrolyte monitoring.  12-19-23: Patient still with concerns of abdominal/groin and lower extremity edema.  Again he had gained 20 pounds prior to his admission.  Continue on high-dose IV Lasix.  If patient not starting to diurese even with the metolazone may need to consider Lasix infusion.  12-20-23: Lasix infusion was initiated by nephrology and the patient is starting to diurese very well.  Will continue Lasix infusion and monitor labs closely.  Creatinine is actually improved to 2.67 today.  He has some low potassium so he will need replacement which is already ordered.  12-21-23: Remains on Lasix gtt.  Weight (standing) is 177.9.  Creatinine 2.47.  Nephro managing diuretics. May be able to transition to oral diuretics soon.  Patient would like to be home for [place].  12-22-23: Lasix gtt changed to oral Torsemide per nephro. Creat 2.32.  K remains low, getting IV KCL currently.  Looks reasonably compensated today.     2-atrial fibrillation-maintaining sinus rhythm with amiodarone.  12-19-23: AV paced rhythm on telemetry.  Denies any palpitations.  He is not on anticoagulation due to history of recurrent bleeding.  May need transfused.     12-20-23: Remains AV paced.  No palpitations or other new cardiac complaints.  12-21-23: AV alternating with V pacing on tele.  Not good anticoagulation candidate d/t anemia and GI bleed.   12-22-23: Remains paced.  No anticoagulation with his GI bleed hx and anemia.      3-coronary artery disease-stable  continue outpatient therapy.  12-19-23: No chest pain or angina.  Blood pressures are well-controlled.  Recommendations--continue conservative management with high-dose Lasix however were not starting to see increase in his diuresis may need Lasix infusion.  As per notes from CHF clinic and the nurse practitioner patient might be nearing the point of palliative care/hospice consideration.  12-20-23:   fortunately no chest pain or angina.  Telemetry shows AV paced rhythm.  Continue Lasix infusion and will monitor labs and ongoing diuresis.  12-21-23: No CP/pressure/palpitations.  Medical tx.   12-22-23: No  CP, continue on medical tx.     Disposition:  Continue on current cardiac meds  Home when OK with IMS  Has close follow up arranged  Will sign off          SOFÍA Vidal-CNP  12/22/2023  8:41 AM

## 2023-12-22 NOTE — PROGRESS NOTES
"      Nephrology Progress Note      Nephrology following for BRUNO on CKD.     Events over night:   Has great UOP on torsemide      /55 (BP Location: Left arm, Patient Position: Sitting)   Pulse 94   Temp 36.7 °C (98.1 °F) (Temporal)   Resp 17   Ht 1.702 m (5' 7\")   Wt 79.3 kg (174 lb 14.4 oz)   SpO2 94%   BMI 27.39 kg/m²     Input / Output:  24 HR:   Intake/Output Summary (Last 24 hours) at 12/22/2023 1201  Last data filed at 12/22/2023 1057  Gross per 24 hour   Intake 1660 ml   Output 3600 ml   Net -1940 ml         Physical Exam   Alert and oriented x 3 NAD  Neck: JVD improved   CV: RRR  Lungs: CTA bilaterally  Abd: soft, NT, ND   Ext: 2+ lower extremity edema    Scheduled medications  amiodarone, 200 mg, oral, Daily  [Held by provider] eplerenone, 50 mg, oral, Daily  levothyroxine, 25 mcg, oral, Daily  melatonin, 3 mg, oral, Daily  metOLazone, 5 mg, oral, Daily  pantoprazole, 40 mg, oral, Daily before breakfast   Or  pantoprazole, 40 mg, intravenous, Daily before breakfast  polyethylene glycol, 17 g, oral, Daily  potassium chloride, 20 mEq, oral, TID  potassium chloride, 20 mEq, intravenous, q2h  rosuvastatin, 5 mg, oral, Daily  sucralfate, 1 g, oral, 4x daily  tafamidis, 61 mg, oral, Daily  torsemide, 100 mg, oral, BID      Continuous medications       PRN medications  PRN medications: acetaminophen, bisacodyl, guaiFENesin, promethazine **OR** promethazine, traMADol   Results from last 7 days   Lab Units 12/22/23  0332   SODIUM mmol/L 134*   POTASSIUM mmol/L 2.4*   CHLORIDE mmol/L 89*   CO2 mmol/L 35*   BUN mg/dL 78*   CREATININE mg/dL 2.32*   CALCIUM mg/dL 7.9*   GLUCOSE mg/dL 83        Results from last 7 days   Lab Units 12/22/23  0332 12/21/23  0532 12/20/23  0329   MAGNESIUM mg/dL 1.98 2.05 2.20        Results from last 7 days   Lab Units 12/22/23  0332 12/21/23  0532 12/20/23  0329   WBC AUTO x10*3/uL 3.4* 3.5* 3.6*   HEMOGLOBIN g/dL 7.7* 8.2* 6.8*   HEMATOCRIT % 23.9* 25.4* 21.7*   PLATELETS " AUTO x10*3/uL 120* 127* 133*          Assessment & Plan:     Patient is 83 y.o. male who is admitted to hospital for SOB . Nephrology consulted in view of BRUNO on CKD.     BRUNO on CKD stage IIIb  -Baseline creatinine 2-2.6  -stable on Lasix drip started on 12/19 stopped 1/21     Acute on chronic systolic and diastolic heart failure  -compensated   Amyloid cardiomyopathy     Hyponatremia  -Hypervolemic in setting of BRUNO    Hypokalemia  -IV and po replacement     Recommendations:   -cont torsemide 100 mg po BID  -Replacing potassium IV and p.o.  -No need for RRT at this time however patient is a poor chronic dialysis candidate and he is not interested anyways  -recheck K at 5 pm, if  above 3.0 I would be ok for discharge home and cont po replacement at home with labs early next week       Please message me through EPIC chat with any questions or concerns.     Doreen Serrano DO  12/22/2023  12:01 PM     [facility]    [address], [address]   [place] [address]  Office: [phone]

## 2023-12-22 NOTE — PROGRESS NOTES
I received TC from pts daughter, Garima, this morning asking me to send referral to [facility].  She explained that she had been in contact with them and had been trying to get a referral from pts PCP but without success so far.  I inquired with our SW who spoke with pt about this and pt declined.  I subsequently met with pt as well to make sure it was OK to speak with Garima.  He reiterated that he did not want it but it was OK to call her and let her know.  I did call Garima back and let her know and she wanted to conference call with pt and myself which we did.  At end of conversation, pt is agreeable.  I called [facility] and they confirmed they are aware of him and to send referral in careport.  MD aware and order placed.  Referral sent via careport with notice to followup as OP.  ADRONNIE tomorrow.

## 2023-12-22 NOTE — PROGRESS NOTES
VICKIE notified by TCC CYRUS Villela RN, pt's daughter calling in inquiring about hospice. Per chart review pt met with palliative care and declined outpatient palliative resources. SW met with pt to confirm his decision, introduced self and role as  with care transitions. Pt confirmed he is not interested in Palliative or Hospice care at this time. SW informed TCC, No other needs identified at this time, SW signing off,  pt and care team aware of SW availability while inpt.     Jassi Wheeler, MSW, LSW ([id number])   Care Transitions

## 2023-12-22 NOTE — PROGRESS NOTES
Subjective   Patient is sitting on the bed comfortably, denies any chest pain shortness of breath or palpitations.  Eager to go home.  No other significant overnight issues.      Objective     Intake/Output last 3 shifts:  I/O last 3 completed shifts:  In: 2540 (30.7 mL/kg) [P.O.:1960; I.V.:80 (1 mL/kg); Blood:300; IV Piggyback:200]  Out: 6250 (75.6 mL/kg) [Urine:6150 (2.1 mL/kg/hr); Stool:100]  Weight: 82.7 kg     Intake/Output this shift:  I/O this shift:  In: 700 [P.O.:400; IV Piggyback:300]  Out: 1450 [Urine:1450]    Recent Results (from the past 48 hour(s))   CBC    Collection Time: 12/21/23  5:32 AM   Result Value Ref Range    WBC 3.5 (L) 4.4 - 11.3 x10*3/uL    nRBC 0.0 0.0 - 0.0 /100 WBCs    RBC 2.68 (L) 4.50 - 5.90 x10*6/uL    Hemoglobin 8.2 (L) 13.5 - 17.5 g/dL    Hematocrit 25.4 (L) 41.0 - 52.0 %    MCV 95 80 - 100 fL    MCH 30.6 26.0 - 34.0 pg    MCHC 32.3 32.0 - 36.0 g/dL    RDW 18.7 (H) 11.5 - 14.5 %    Platelets 127 (L) 150 - 450 x10*3/uL   Basic Metabolic Panel    Collection Time: 12/21/23  5:32 AM   Result Value Ref Range    Glucose 81 74 - 99 mg/dL    Sodium 133 (L) 136 - 145 mmol/L    Potassium 2.5 (LL) 3.5 - 5.3 mmol/L    Chloride 90 (L) 98 - 107 mmol/L    Bicarbonate 31 21 - 32 mmol/L    Anion Gap 15 10 - 20 mmol/L    Urea Nitrogen 79 (H) 6 - 23 mg/dL    Creatinine 2.47 (H) 0.50 - 1.30 mg/dL    eGFR 25 (L) >60 mL/min/1.73m*2    Calcium 8.5 (L) 8.6 - 10.3 mg/dL   Magnesium    Collection Time: 12/21/23  5:32 AM   Result Value Ref Range    Magnesium 2.05 1.60 - 2.40 mg/dL   CBC    Collection Time: 12/22/23  3:32 AM   Result Value Ref Range    WBC 3.4 (L) 4.4 - 11.3 x10*3/uL    nRBC 0.0 0.0 - 0.0 /100 WBCs    RBC 2.55 (L) 4.50 - 5.90 x10*6/uL    Hemoglobin 7.7 (L) 13.5 - 17.5 g/dL    Hematocrit 23.9 (L) 41.0 - 52.0 %    MCV 94 80 - 100 fL    MCH 30.2 26.0 - 34.0 pg    MCHC 32.2 32.0 - 36.0 g/dL    RDW 18.2 (H) 11.5 - 14.5 %    Platelets 120 (L) 150 - 450 x10*3/uL   Basic Metabolic Panel    Collection  Time: 12/22/23  3:32 AM   Result Value Ref Range    Glucose 83 74 - 99 mg/dL    Sodium 134 (L) 136 - 145 mmol/L    Potassium 2.4 (LL) 3.5 - 5.3 mmol/L    Chloride 89 (L) 98 - 107 mmol/L    Bicarbonate 35 (H) 21 - 32 mmol/L    Anion Gap 12 10 - 20 mmol/L    Urea Nitrogen 78 (H) 6 - 23 mg/dL    Creatinine 2.32 (H) 0.50 - 1.30 mg/dL    eGFR 27 (L) >60 mL/min/1.73m*2    Calcium 7.9 (L) 8.6 - 10.3 mg/dL   Magnesium    Collection Time: 12/22/23  3:32 AM   Result Value Ref Range    Magnesium 1.98 1.60 - 2.40 mg/dL        Transthoracic Echo (TTE) [place] [address]       Phone [phone] Fax [phone]    TRANSTHORACIC ECHOCARDIOGRAM REPORT       Patient Name:     LUIS CARLOS Linda Physician:  [id number] Connor Florence MD  Study Date:       12/19/2023         Ordering Provider:  [id number] IRENE GAO  MRN/PID:          [id number]           Fellow:  Accession#:       [id number]       Nurse:  Date of           1940 / 83      Sonographer:        Ernestina Landis RDCS  Birth/Age:        years  Gender:           M                  Additional Staff:  Height:           177.00 cm          Admit Date:         12/18/2023  Weight:           86.00 kg           Admission Status:   Inpatient - Routine  BSA:              2.03 m2            [facility] ICU                                       Location:  Blood Pressure: 122 /60 mmHg    Study Type:    TRANSTHORACIC ECHO (TTE) LIMITED  Diagnosis/ICD: Localized edema-R60.0  Indication:    Scrotal Edema  CPT Codes:     Echo Limited-33591; Myocardial Strain Imaging-68184   Study Detail: The following Echo studies were performed: 2D, M-Mode and Strain.       PHYSICIAN INTERPRETATION:  Left Ventricle: Left ventricular systolic function is moderately decreased, with an estimated ejection fraction of 40-45%. Wall motion is abnormal. The left  ventricular cavity size is normal. Left Ventricular Global Longitudinal Strain - -13.2 %. Spectral Doppler shows an impaired relaxation pattern of left ventricular diastolic filling. Strain values are suspicious for cardiac amyloidosis with relative apical sparing.Clinical correlation is suggested  Left Atrium: The left atrium was not assessed. Not assessed.  Right Ventricle: The right ventricle was not assessed. Right ventricular systolic function not assessed.  Right Atrium: The right atrium was not assessed.  Aortic Valve: The aortic valve was not assessed. Aortic valve regurgitation was not assessed.  Mitral Valve: The mitral valve was not assessed. Mitral valve regurgitation was not assessed.  Tricuspid Valve: The tricuspid valve was not assessed. Tricuspid regurgitation was not assessed.  Pulmonic Valve: The pulmonic valve was not assessed. The pulmonic valve regurgitation was not assessed.  Pericardium: Pericardial effusion was not assessed.  Aorta: The aortic root was not assessed.       CONCLUSIONS:   1. Left ventricular systolic function is moderately decreased with a 40-45% estimated ejection fraction.   2. Spectral Doppler shows an impaired relaxation pattern of left ventricular diastolic filling.    QUANTITATIVE DATA SUMMARY:  2D MEASUREMENTS:                            Normal Ranges:  LAs:           5.17 cm    (2.7-4.0cm)  IVSd:          1.67 cm    (0.6-1.1cm)  LVPWd:         1.68 cm    (0.6-1.1cm)  LVIDd:         4.64 cm    (3.9-5.9cm)  LVIDs:         3.63 cm  LV Mass Index: 168.1 g/m2  LV % FS        21.7 %    LV SYSTOLIC FUNCTION BY 2D PLANIMETRY (MOD):                                            Normal Ranges:  EF-A4C View:                      34.8 %  (>=55%)  EF-A2C View:                      48.7 %  EF-Biplane:                       42.2 %  Global Longitudinal Strain (GLS): -13.2 %       [id number] Connor Florence MD  Electronically signed on 12/19/2023 at 1:35:41 PM       ** Final **       Vital signs in  last 24 hours:  Temp:  [36 °C (96.8 °F)-37.1 °C (98.7 °F)] 36.2 °C (97.1 °F)  Heart Rate:  [69-94] 72  Resp:  [16-18] 16  BP: ()/(47-61) 106/60    Physical Exam  General: No distress  Neck: Supple.  HEENT: Normocephalic atraumatic pupils are collected  Heart: S1-S2 heard no murmurs gallops regurgitation  Lungs: Clear to auscultation bilaterally no wheezing rales or rhonchi.    Assessment/Plan   1.)  Acute on chronic combined systolic and diastolic congestive heart failure  Initially patient was treated with IV Lasix  Transition to torsemide  -Monitor renal function closely with diuresis   -Monitor electrolytes and replenish generously as needed   -Strict I&O’s   -2g salt restriction, 2L fluid restriction   -Monitor fluid status closely   -Cardiology consultation, appreciate further recommendations   -Discharge plan in a.m. if patient condition continues to improve, potassium level improve     2.) BRUNO on CKD, possibly secondary to cardiorenal syndrome  -Baseline serum creatinine: ~2.3-2.6  -Creatinine at admission: 3.00 with BUN 90 (increase > 0.3mg/dL from baseline)  -Follow UOP  -Recheck renal function panel in AM. Can check urine studies if continuing to worsen.   Nephrology also evaluated the patient, once potassium is corrected okay for discharge.       3.) Type II MI / Demand ischemia   -Baseline troponin leak, but slightly elevated from baseline   -Suspect multifactorial in setting of #1, #2   -No CP or anginal symptoms presently   Cardiology evaluated the patient, no further workup per recommendation.  Signed off.     4.) Paroxysmal atrial fibrillation/flutter s/p DCCV  -No current AC d/t hx GIB  -Continue home therapies  -Tele and electrolyte monitoring      5.) s/p PPM   -Tele  -Device clinic as scheduled      6.) HTN --> intermittent HoTN, DLD, CAD s/p remote CABGx3 and PCI to RCA  -Continued on home therapies   -Close BP monitoring, currently trending softer end of normal      7.) wtTTR  amyloid  -Continue home meds  -Cardiology following as above      8.) COPD  -Not in acute exacerbation   -Continued on home therapies      9.) Rectal CA s/p proctocolectomy 2018 (with colostomy) and recurrent GI bleeds (following with GI)  -No evidence of bleeding acutely  -Continue home therapies  -Continue outpatient follow-up as scheduled      10.  Anemia  Secondary to chronic GI loss, no apparent bleeding  Status post PRBC transfusion  Monitor H&H and transfuse to maintain hemoglobin of more than 7    11.  Hypokalemia-repeat potassium is still low at 8.4.  Continue replacement  Recheck with a.m. labs.    Disposition:  Pending potassium correction.  Possible discharge home tomorrow.     LOS: 3 days

## 2023-12-22 NOTE — CARE PLAN
The patient's goals for the shift include      The clinical goals for the shift include tolerate potassium replacement      Problem: Pain - Adult  Goal: Verbalizes/displays adequate comfort level or baseline comfort level  Outcome: Progressing     Problem: Safety - Adult  Goal: Free from fall injury  Outcome: Progressing     Problem: Discharge Planning  Goal: Discharge to home or other facility with appropriate resources  Outcome: Progressing     Problem: Chronic Conditions and Co-morbidities  Goal: Patient's chronic conditions and co-morbidity symptoms are monitored and maintained or improved  Outcome: Progressing     Problem: Fall/Injury  Goal: Not fall by end of shift  Outcome: Progressing  Goal: Be free from injury by end of the shift  Outcome: Progressing  Goal: Verbalize understanding of personal risk factors for fall in the hospital  Outcome: Progressing  Goal: Verbalize understanding of risk factor reduction measures to prevent injury from fall in the home  Outcome: Progressing  Goal: Use assistive devices by end of the shift  Outcome: Progressing  Goal: Pace activities to prevent fatigue by end of the shift  Outcome: Progressing     Problem: Heart Failure  Goal: Improved gas exchange this shift  Outcome: Progressing  Goal: Improved urinary output this shift  Outcome: Progressing  Goal: Reduction in peripheral edema within 24 hours  Outcome: Progressing  Goal: Report improvement of dyspnea/breathlessness this shift  Outcome: Progressing  Goal: Weight from fluid excess reduced over 2-3 days, then stabilize  Outcome: Progressing  Goal: Increase self care and/or family involvement in 24 hours  Outcome: Progressing

## 2023-12-23 NOTE — PROGRESS NOTES
Subjective   Patient is lying on bed, looks comfortable, alert and oriented x 1 still having on and off confusion episodes with agitation.  This morning patient had a significant agitation episode requiring 1 dose of Ativan.  Patient's sister and other family members were at bedside-as per family patient has some confusion episodes even at home.  No other significant overnight issues      Objective     Intake/Output last 3 shifts:  I/O last 3 completed shifts:  In: 1590 (19.4 mL/kg) [P.O.:1290; IV Piggyback:300]  Out: 4150 (50.6 mL/kg) [Urine:4100 (1.4 mL/kg/hr); Stool:50]  Weight: 82 kg     Intake/Output this shift:  I/O this shift:  In: -   Out: 850 [Urine:850]    Recent Results (from the past 48 hour(s))   CBC    Collection Time: 12/22/23  3:32 AM   Result Value Ref Range    WBC 3.4 (L) 4.4 - 11.3 x10*3/uL    nRBC 0.0 0.0 - 0.0 /100 WBCs    RBC 2.55 (L) 4.50 - 5.90 x10*6/uL    Hemoglobin 7.7 (L) 13.5 - 17.5 g/dL    Hematocrit 23.9 (L) 41.0 - 52.0 %    MCV 94 80 - 100 fL    MCH 30.2 26.0 - 34.0 pg    MCHC 32.2 32.0 - 36.0 g/dL    RDW 18.2 (H) 11.5 - 14.5 %    Platelets 120 (L) 150 - 450 x10*3/uL   Basic Metabolic Panel    Collection Time: 12/22/23  3:32 AM   Result Value Ref Range    Glucose 83 74 - 99 mg/dL    Sodium 134 (L) 136 - 145 mmol/L    Potassium 2.4 (LL) 3.5 - 5.3 mmol/L    Chloride 89 (L) 98 - 107 mmol/L    Bicarbonate 35 (H) 21 - 32 mmol/L    Anion Gap 12 10 - 20 mmol/L    Urea Nitrogen 78 (H) 6 - 23 mg/dL    Creatinine 2.32 (H) 0.50 - 1.30 mg/dL    eGFR 27 (L) >60 mL/min/1.73m*2    Calcium 7.9 (L) 8.6 - 10.3 mg/dL   Magnesium    Collection Time: 12/22/23  3:32 AM   Result Value Ref Range    Magnesium 1.98 1.60 - 2.40 mg/dL   Potassium    Collection Time: 12/22/23  5:13 PM   Result Value Ref Range    Potassium 3.1 (L) 3.5 - 5.3 mmol/L   Basic Metabolic Panel    Collection Time: 12/23/23  4:08 AM   Result Value Ref Range    Glucose 96 74 - 99 mg/dL    Sodium 135 (L) 136 - 145 mmol/L    Potassium 2.7  (LL) 3.5 - 5.3 mmol/L    Chloride 90 (L) 98 - 107 mmol/L    Bicarbonate 35 (H) 21 - 32 mmol/L    Anion Gap 13 10 - 20 mmol/L    Urea Nitrogen 73 (H) 6 - 23 mg/dL    Creatinine 2.06 (H) 0.50 - 1.30 mg/dL    eGFR 31 (L) >60 mL/min/1.73m*2    Calcium 8.0 (L) 8.6 - 10.3 mg/dL   Magnesium    Collection Time: 12/23/23  4:08 AM   Result Value Ref Range    Magnesium 1.95 1.60 - 2.40 mg/dL        Transthoracic Echo (TTE) [place] [address]       Phone [phone] Fax [phone]    TRANSTHORACIC ECHOCARDIOGRAM REPORT       Patient Name:     LUIS CARLOS Linda Physician:  [id number] Connor Florence MD  Study Date:       12/19/2023         Ordering Provider:  [id number] IRENE GAO  MRN/PID:          [id number]           Fellow:  Accession#:       [id number]       Nurse:  Date of           1940 / 83      Sonographer:        Ernestina Landis RDCS  Birth/Age:        years  Gender:           M                  Additional Staff:  Height:           177.00 cm          Admit Date:         12/18/2023  Weight:           86.00 kg           Admission Status:   Inpatient - Routine  BSA:              2.03 m2            Department          [facility]                                       Location:  Blood Pressure: 122 /60 mmHg    Study Type:    TRANSTHORACIC ECHO (TTE) LIMITED  Diagnosis/ICD: Localized edema-R60.0  Indication:    Scrotal Edema  CPT Codes:     Echo Limited-16293; Myocardial Strain Imaging-94408   Study Detail: The following Echo studies were performed: 2D, M-Mode and Strain.       PHYSICIAN INTERPRETATION:  Left Ventricle: Left ventricular systolic function is moderately decreased, with an estimated ejection fraction of 40-45%. Wall motion is abnormal. The left ventricular cavity size is normal. Left Ventricular Global Longitudinal Strain - -13.2 %. Spectral Doppler shows an impaired  relaxation pattern of left ventricular diastolic filling. Strain values are suspicious for cardiac amyloidosis with relative apical sparing.Clinical correlation is suggested  Left Atrium: The left atrium was not assessed. Not assessed.  Right Ventricle: The right ventricle was not assessed. Right ventricular systolic function not assessed.  Right Atrium: The right atrium was not assessed.  Aortic Valve: The aortic valve was not assessed. Aortic valve regurgitation was not assessed.  Mitral Valve: The mitral valve was not assessed. Mitral valve regurgitation was not assessed.  Tricuspid Valve: The tricuspid valve was not assessed. Tricuspid regurgitation was not assessed.  Pulmonic Valve: The pulmonic valve was not assessed. The pulmonic valve regurgitation was not assessed.  Pericardium: Pericardial effusion was not assessed.  Aorta: The aortic root was not assessed.       CONCLUSIONS:   1. Left ventricular systolic function is moderately decreased with a 40-45% estimated ejection fraction.   2. Spectral Doppler shows an impaired relaxation pattern of left ventricular diastolic filling.    QUANTITATIVE DATA SUMMARY:  2D MEASUREMENTS:                            Normal Ranges:  LAs:           5.17 cm    (2.7-4.0cm)  IVSd:          1.67 cm    (0.6-1.1cm)  LVPWd:         1.68 cm    (0.6-1.1cm)  LVIDd:         4.64 cm    (3.9-5.9cm)  LVIDs:         3.63 cm  LV Mass Index: 168.1 g/m2  LV % FS        21.7 %    LV SYSTOLIC FUNCTION BY 2D PLANIMETRY (MOD):                                            Normal Ranges:  EF-A4C View:                      34.8 %  (>=55%)  EF-A2C View:                      48.7 %  EF-Biplane:                       42.2 %  Global Longitudinal Strain (GLS): -13.2 %       [id number] Connor Florence MD  Electronically signed on 12/19/2023 at 1:35:41 PM       ** Final **       Vital signs in last 24 hours:  Temp:  [36.2 °C (97.1 °F)-36.8 °C (98.2 °F)] 36.4 °C (97.6 °F)  Heart Rate:  [70-74] 71  Resp:  [16-18]  18  BP: (102-112)/(51-67) 108/51    Physical Exam  General: No distress  Neck: Supple.  HEENT: Normocephalic atraumatic pupils are collected  Heart: S1-S2 heard no murmurs gallops regurgitation  Lungs: Clear to auscultation bilaterally no wheezing rales or rhonchi.    Assessment/Plan   1.)  Acute on chronic combined systolic and diastolic congestive heart failure  Initially patient was treated with IV Lasix  Transition to torsemide  -Monitor renal function closely with diuresis   -Monitor electrolytes and replenish generously as needed   -Strict I&O’s   -2g salt restriction, 2L fluid restriction   -Monitor fluid status closely   -Cardiology consultation, appreciate further recommendations     2.dementia/delirium.  Patient is having some agitation anxiety episodes, requiring as needed Ativan.    3.  Hypokalemia:  Despite replacement yesterday, repeat potassium is still low at 2.7, continue replacement.  Recheck with a.m. labs     2.) BRUNO on CKD, possibly secondary to cardiorenal syndrome  -Baseline serum creatinine: ~2.3-2.6  -Creatinine at admission: 3.00 with BUN 90 (increase > 0.3mg/dL from baseline)  -Follow UOP  -Recheck renal function panel in AM. Can check urine studies if continuing to worsen.   Nephrology also evaluated the patient, once potassium is corrected okay for discharge.       3.) Type II MI / Demand ischemia   -Baseline troponin leak, but slightly elevated from baseline   -Suspect multifactorial in setting of #1, #2   -No CP or anginal symptoms presently   Cardiology evaluated the patient, no further workup per recommendation.  Signed off.     4.) Paroxysmal atrial fibrillation/flutter s/p DCCV  -No current AC d/t hx GIB  -Continue home therapies  -Tele and electrolyte monitoring      5.) s/p PPM   -Tele  -Device clinic as scheduled      6.) HTN --> intermittent HoTN, DLD, CAD s/p remote CABGx3 and PCI to RCA  -Continued on home therapies   -Close BP monitoring, currently trending softer end of  normal      7.) wtTTR amyloid  -Continue home meds  -Cardiology following as above      8.) COPD  -Not in acute exacerbation   -Continued on home therapies      9.) Rectal CA s/p proctocolectomy 2018 (with colostomy) and recurrent GI bleeds (following with GI)  -No evidence of bleeding acutely  -Continue home therapies  -Continue outpatient follow-up as scheduled      10.  Anemia  Secondary to chronic GI loss, no apparent bleeding  Status post PRBC transfusion  Monitor H&H and transfuse to maintain hemoglobin of more than 7      Disposition:  As per patient's daughter's request hospice referral has been placed.  Possible discharge home in next 1 to 2 days     LOS: 4 days

## 2023-12-23 NOTE — SIGNIFICANT EVENT
I was notified by the nurse, recommend prompt the patient  On examination:  Pupils fixed nonreactive to light.  No breath sounds  No heart sounds  No pulse.    Reason for death:  Hypotension, respiratory failure.    Patient was pronounced at on 12/23 at 1730

## 2023-12-23 NOTE — SIGNIFICANT EVENT
Response was called on the patient due to hypotension.  Rest of the vitals are within normal limits.  Patient remains asymptomatic, alert and oriented x 2 still having some on and off confusion episodes and no agitation noticed.  Patient also spiked fevers with Tmax 101.4.  Repeating CBC, BMP, lactic acid, blood cultures.  Started patient on Zosyn.  COVID test ordered.  Patient received 1 L normal saline bolus.  Also gave 1 dose of 5 mg midodrine.  Recheck blood pressure again in 1 to 2 hours.    Discussed with the patient's family daughter Tiffanie who is her medical power of , patient's CODE STATUS remains DNR CCA DNI, no ICU transfer.  As per daughter's request yesterday hospice referral was sent to  Continue symptomatic treatment.

## 2023-12-23 NOTE — PROGRESS NOTES
"      Nephrology Progress Note      Nephrology following for BRUNO on CKD.     Events over night: became confused and agitated-given ativan   Has great UOP on torsemide  confused and lethargic today  Repeat K 3.1 last night now 2.7 this am       /51 (BP Location: Right arm, Patient Position: Sitting)   Pulse 71   Temp 36.4 °C (97.6 °F) (Temporal)   Resp 18   Ht 1.702 m (5' 7\")   Wt 82 kg (180 lb 12.4 oz)   SpO2 95%   BMI 28.31 kg/m²     Input / Output:  24 HR:   Intake/Output Summary (Last 24 hours) at 12/23/2023 1506  Last data filed at 12/23/2023 0710  Gross per 24 hour   Intake 150 ml   Output 1900 ml   Net -1750 ml         Physical Exam   Alert and oriented x 3 NAD  Neck: JVD improved   CV: RRR  Lungs: CTA bilaterally  Abd: soft, NT, ND   Ext: 2+ lower extremity edema    Scheduled medications  amiodarone, 200 mg, oral, Daily  [Held by provider] eplerenone, 50 mg, oral, Daily  levothyroxine, 25 mcg, oral, Daily  melatonin, 3 mg, oral, Daily  metOLazone, 5 mg, oral, Daily  pantoprazole, 40 mg, oral, Daily before breakfast   Or  pantoprazole, 40 mg, intravenous, Daily before breakfast  polyethylene glycol, 17 g, oral, Daily  potassium chloride, 20 mEq, intravenous, q2h  rosuvastatin, 5 mg, oral, Daily  sucralfate, 1 g, oral, 4x daily  tafamidis, 61 mg, oral, Daily  torsemide, 100 mg, oral, BID      Continuous medications       PRN medications  PRN medications: acetaminophen, bisacodyl, guaiFENesin, LORazepam, promethazine **OR** promethazine, traMADol   Results from last 7 days   Lab Units 12/23/23  0408   SODIUM mmol/L 135*   POTASSIUM mmol/L 2.7*   CHLORIDE mmol/L 90*   CO2 mmol/L 35*   BUN mg/dL 73*   CREATININE mg/dL 2.06*   CALCIUM mg/dL 8.0*   GLUCOSE mg/dL 96        Results from last 7 days   Lab Units 12/23/23  0408 12/22/23  0332 12/21/23  0532   MAGNESIUM mg/dL 1.95 1.98 2.05        Results from last 7 days   Lab Units 12/22/23  0332 12/21/23  0532 12/20/23  0329   WBC AUTO x10*3/uL 3.4* 3.5* " 3.6*   HEMOGLOBIN g/dL 7.7* 8.2* 6.8*   HEMATOCRIT % 23.9* 25.4* 21.7*   PLATELETS AUTO x10*3/uL 120* 127* 133*          Assessment & Plan:     Patient is 83 y.o. male who is admitted to hospital for SOB . Nephrology consulted in view of BRUNO on CKD.     BRUNO on CKD stage IIIb  -Baseline creatinine 2-2.6  -stable on Lasix drip started on 12/19 stopped 12/21  -Cr continues to improve       Acute on chronic systolic and diastolic heart failure  -compensated   Amyloid cardiomyopathy     Hyponatremia  -Hypervolemic in setting of BRUNO    Hypokalemia  -IV and po replacement    Delirium      Recommendations:     -cont torsemide 100 mg po BID  -Replacing potassium IV and p.o.  -No need for RRT at this time but pt is not a candidate and has had hospice involved       Please message me through [facility] chat with any questions or concerns.     Doreen Serrano DO  12/23/2023  3:06 PM     [facility]    [address], [address]   [place] [address]  Office: [phone]

## 2023-12-26 NOTE — DISCHARGE SUMMARY
Discharge Diagnosis  Acute on chronic combined systolic and diastolic heart failure.  Hypokalemia.  Acute on chronic kidney disease.  Dementia/delirium.  Demand ischemia-type II MI.  Paroxysmal atrial fibrillation.  S/p permanent pacemaker.  COPD.  Rectal cancer s/p proctocolectomy 2018 with recurrent GI bleeds.  Anemia.    Hospital Course  Patient was admitted for acute on chronic combined systolic and diastolic heart failure and electrolyte imbalance with hypokalemia.  At baseline due to his dementia patient has on and off delirium especially when hospitalized.  Started treating with IV Lasix, later transition to p.o. torsemide.  Continue to replace potassium.  Cardiology was managing his CHF exacerbation.  Given his significant electrolyte imbalance with hypokalemia in the setting of acute on chronic kidney disease nephrology managing electrolyte imbalance and creatinine.  Also given patient multiple comorbidities in the setting of dementia, palliative was following the patient.  CODE STATUS remains DNR CCA no ICU transfer.    On 12/23 around 4 PM patient had a rapid response  Response was called on the patient due to hypotension.  Patient remains asymptomatic, alert and oriented x 2 still having some on and off confusion episodes and no agitation noticed.  Patient also spiked fevers with Tmax 101.4.  Repeating CBC, BMP, lactic acid, blood cultures.  Started patient on Zosyn.  COVID test ordered.  Patient received 1 L normal saline bolus.  Also gave 1 dose of 5 mg midodrine.    Discussed with the patient's family daughter Tiffanie who is her medical power of , patient's CODE STATUS remains DNR CCA DNI, no ICU transfer.  As per daughter's request yesterday hospice referral was sent.    Later I was called by the nurse and notified that patient passed away and needs death pronouncement.  The patient was pronounced dead on 12/23 at 1730      Marshal Huang MD

## 2023-12-27 NOTE — DOCUMENTATION CLARIFICATION NOTE
PATIENT:               LUIS CARLOS AMIN  ACCT #:                  [id number]  MRN:                       [id number]  :                       1940  ADMIT DATE:       2023 10:08 AM  DISCH DATE:        2023 7:27 PM  RESPONDING PROVIDER #:        [id number]          PROVIDER RESPONSE TEXT:    COVID 19 not clinically significant    CDI QUERY TEXT:    [facility]_Abnormal Studies        Instruction:    Based on your assessment of the patient and the clinical information, please provide the requested documentation by clicking on the appropriate radio button and enter any additional information if prompted.    Question: Is there a diagnosis indicative of the lab values or image study    When answering this query, please exercise your independent professional judgment. The fact that a question is being asked, does not imply that any particular answer is desired or expected.    The patient's clinical indicators include:  Clinical Information: 83 yr. old admitted with acute on chronic combined CHF, hypokalemia, BRUNO on CKD, Dementia/delirium, Type II MI.    Clinical Indicators:   Rapid response called for hypotension, T 101.4, on and off confusion.  Vital signs 23: T 101.4, bp 82/46, hr 73, rr 20, pox 91percent on 4liters.  Covid Test came back positive.    Treatment: IV Zosyn, IV Fluid, Midodrine, Patient DNR CCA DNI, no ICU. Patient soon .    Risk Factors: Multiple medical issues, Hospital stay.  Options provided:  -- COVID 19 is clinically significant to patients demise  -- COVID 19 not clinically significant  -- Other - I will add my own diagnosis  -- Refer to Clinical Documentation Reviewer    Query created by: Ginger Higuera on 2023 11:13 AM      Electronically signed by:  RANDI RO MD 2023 3:00 PM

## 2023-12-28 ENCOUNTER — APPOINTMENT (OUTPATIENT)
Dept: CARDIOLOGY | Facility: CLINIC | Age: 83
End: 2023-12-28
Payer: MEDICARE

## 2023-12-28 LAB
BACTERIA BLD AEROBE CULT: ABNORMAL
BACTERIA BLD CULT: ABNORMAL
GRAM STN SPEC: ABNORMAL

## 2024-01-08 ENCOUNTER — APPOINTMENT (OUTPATIENT)
Dept: OPERATING ROOM | Facility: HOSPITAL | Age: 84
End: 2024-01-08
Payer: MEDICARE

## 2024-02-16 ENCOUNTER — APPOINTMENT (OUTPATIENT)
Dept: CARDIOLOGY | Facility: HOSPITAL | Age: 84
End: 2024-02-16
Payer: MEDICARE

## 2025-02-25 NOTE — ADDENDUM NOTE
Addended by: ADDY HUTCHINSON on: 10/27/2023 05:05 PM     Modules accepted: Level of Service     cell phone/clothing/shoes